# Patient Record
Sex: FEMALE | Race: WHITE | NOT HISPANIC OR LATINO | Employment: UNEMPLOYED | URBAN - METROPOLITAN AREA
[De-identification: names, ages, dates, MRNs, and addresses within clinical notes are randomized per-mention and may not be internally consistent; named-entity substitution may affect disease eponyms.]

---

## 2017-02-01 RX ORDER — LEVOTHYROXINE SODIUM 75 UG/1
TABLET ORAL
Qty: 114 TABLET | Refills: 3 | OUTPATIENT
Start: 2017-02-01

## 2017-02-27 DIAGNOSIS — E06.3 HASHIMOTO'S THYROIDITIS: ICD-10-CM

## 2017-02-27 LAB
T4 FREE SERPL-MCNC: 1 NG/DL (ref 0.76–1.46)
TSH SERPL DL<=0.05 MIU/L-ACNC: 1.17 MU/L (ref 0.4–4)

## 2017-02-27 PROCEDURE — 36415 COLL VENOUS BLD VENIPUNCTURE: CPT | Performed by: INTERNAL MEDICINE

## 2017-02-27 PROCEDURE — 84439 ASSAY OF FREE THYROXINE: CPT | Performed by: INTERNAL MEDICINE

## 2017-02-27 PROCEDURE — 84443 ASSAY THYROID STIM HORMONE: CPT | Performed by: INTERNAL MEDICINE

## 2017-03-14 ENCOUNTER — ANESTHESIA (OUTPATIENT)
Dept: OBGYN | Facility: CLINIC | Age: 37
End: 2017-03-14
Payer: COMMERCIAL

## 2017-03-14 ENCOUNTER — ANESTHESIA EVENT (OUTPATIENT)
Dept: OBGYN | Facility: CLINIC | Age: 37
End: 2017-03-14
Payer: COMMERCIAL

## 2017-03-14 PROCEDURE — 25000128 H RX IP 250 OP 636

## 2017-03-14 RX ADMIN — ROPIVACAINE HYDROCHLORIDE 8 ML: 2 INJECTION, SOLUTION EPIDURAL; INFILTRATION at 12:40

## 2017-03-14 RX ADMIN — ROPIVACAINE HYDROCHLORIDE 10 ML: 2 INJECTION, SOLUTION EPIDURAL; INFILTRATION at 11:45

## 2017-03-14 NOTE — ANESTHESIA PROCEDURE NOTES
Peripheral nerve/Neuraxial procedure note : epidural catheter  Pre-Procedure  Performed by ALEKSEY BOONE  Referred by ROBIN  Location: OB      Pre-Anesthestic Checklist: patient identified, risks and benefits discussed, informed consent and at physician/surgeon's request    Timeout  Correct Patient: Yes   Correct Procedure: Yes   Correct Site: Yes   Correct Laterality: N/A   Correct Position: Yes   Site Marked: N/A   .   Procedure Documentation    .    Procedure:    Epidural catheter.  Insertion Site:L3-4  (midline approach) Injection technique: LORT saline   Local skin infiltrated with mL of 1% lidocaine.       Patient Prep;povidone-iodine 7.5% surgical scrub.  .  Needle: Touhy needle (17 G. 3.5 in). # of attempts: 1. # of redirects:.  Spinal Needle: . . . . .  Catheter threaded easily  3 cm epidural space.  7 cm at skin.   .    Assessment/Narrative  Paresthesias: No.  .  .  Aspiration negative for heme or CSF  . Test dose of 3 mL at. Test dose negative for signs of intravascular, subdural or intrathecal injection. Comments:  No complications

## 2017-03-14 NOTE — ANESTHESIA PREPROCEDURE EVALUATION
Anesthesia Evaluation     .        ROS/MED HX    ENT/Pulmonary:       Neurologic:       Cardiovascular:         METS/Exercise Tolerance:     Hematologic:         Musculoskeletal:         GI/Hepatic:         Renal/Genitourinary:         Endo:     (+) thyroid problem hypothyroidism, .      Psychiatric:         Infectious Disease:         Malignancy:         Other:                              Anesthesia Plan      History & Physical Review      ASA Status:  2 .        Plan for Epidural          Postoperative Care      Consents                          .

## 2017-03-16 NOTE — ANESTHESIA POSTPROCEDURE EVALUATION
Patient: Karen Beverly    * No procedures listed *    Diagnosis:* No pre-op diagnosis entered *  Diagnosis Additional Information: No value filed.    Anesthesia Type:  No value filed.    Note:  Anesthesia Post Evaluation    Patient location during evaluation: bedside  Patient participation: Able to fully participate in evaluation  Level of consciousness: awake and awake and alert  Pain management: adequate  Airway patency: patent  Cardiovascular status: acceptable  Respiratory status: acceptable  Hydration status: acceptable  PONV: none     Anesthetic complications: None    Comments: Ambulating.  Denies HA, paresthesias or complications related to epidural.          Last vitals:  Vitals:    03/15/17 0030 03/15/17 0740 03/15/17 1540   BP: 100/61 (!) 88/54 104/68   Pulse:      Resp: 18 18 18   Temp: 36.6  C (97.9  F) 36.7  C (98.1  F) 36.7  C (98  F)         Electronically Signed By: Erick Davidson MD  March 15, 2017  7:12 PM

## 2017-04-09 ENCOUNTER — OFFICE VISIT (OUTPATIENT)
Dept: URGENT CARE | Facility: URGENT CARE | Age: 37
End: 2017-04-09
Payer: COMMERCIAL

## 2017-04-09 VITALS
BODY MASS INDEX: 25.75 KG/M2 | OXYGEN SATURATION: 100 % | SYSTOLIC BLOOD PRESSURE: 108 MMHG | TEMPERATURE: 99 F | DIASTOLIC BLOOD PRESSURE: 73 MMHG | WEIGHT: 150 LBS | HEART RATE: 100 BPM

## 2017-04-09 DIAGNOSIS — H66.001 ACUTE SUPPURATIVE OTITIS MEDIA OF RIGHT EAR WITHOUT SPONTANEOUS RUPTURE OF TYMPANIC MEMBRANE, RECURRENCE NOT SPECIFIED: Primary | ICD-10-CM

## 2017-04-09 LAB
FLUAV+FLUBV AG SPEC QL: NEGATIVE
FLUAV+FLUBV AG SPEC QL: NORMAL
SPECIMEN SOURCE: NORMAL

## 2017-04-09 PROCEDURE — 99213 OFFICE O/P EST LOW 20 MIN: CPT | Performed by: NURSE PRACTITIONER

## 2017-04-09 PROCEDURE — 87804 INFLUENZA ASSAY W/OPTIC: CPT | Mod: 59 | Performed by: NURSE PRACTITIONER

## 2017-04-09 RX ORDER — AMOXICILLIN 500 MG/1
500 CAPSULE ORAL 3 TIMES DAILY
Qty: 30 CAPSULE | Refills: 0 | Status: SHIPPED | OUTPATIENT
Start: 2017-04-09 | End: 2017-06-11

## 2017-04-09 NOTE — PROGRESS NOTES
SUBJECTIVE:                                                    Karen Beverly is a 37 year old female who presents to clinic today for the following health issues:      RESPIRATORY SYMPTOMS      Duration: 2 days    Description  nasal congestion, facial pain/pressure, fever, ear pain right     Severity: moderate- severe    History (predisposing factors):  none    Precipitating or alleviating factors: None    Therapies tried and outcome:  tylenol     Problem list and histories reviewed & adjusted, as indicated.  Additional history: as documented    Patient Active Problem List   Diagnosis     Pregnancy related condition     Elevated TSH     Thyroid disease, antepartum     History of multiple miscarriages     Hashimoto's thyroiditis     Indication for care in labor or delivery     Labor and delivery, indication for care     Past Surgical History:   Procedure Laterality Date     DAVINCI PELVIC PROCEDURE  2/22/2012    Procedure:DAVINCI PELVIC PROCEDURE; DAVINCI DIAGNOSTIC PELVISCOPY WITH OMNIGUIDE C02 LASER, DIAGNOSTIC HYSTEROSCOPY, EXTENSIVE LYSIS EXCISION OF ENDOMETRIOSIS, BILATERAL URETEROLYSIS, D & C; Surgeon:JOHN KELLY; Location: OR       Social History   Substance Use Topics     Smoking status: Never Smoker     Smokeless tobacco: Never Used     Alcohol use No     Family History   Problem Relation Age of Onset     Hypertension Mother      CANCER Other      liver cancer           ROS:  Constitutional, HEENT, cardiovascular, pulmonary, gi and gu systems are negative, except as otherwise noted.    OBJECTIVE:                                                    /73  Pulse 100  Temp 99  F (37.2  C) (Oral)  Wt 150 lb (68 kg)  LMP 06/11/2016  SpO2 100%  Breastfeeding? No  BMI 25.75 kg/m2  Body mass index is 25.75 kg/(m^2).  GENERAL: healthy, alert and no distress  EYES: Eyes grossly normal to inspection, PERRL and conjunctivae and sclerae normal  HENT: right ear: erythematous and bulging  membrane, left ear: normal: no effusions, no erythema, normal landmarks, nose and mouth without ulcers or lesions, oropharynx clear and oral mucous membranes moist  NECK: no adenopathy, no asymmetry, masses, or scars and thyroid normal to palpation  RESP: lungs clear to auscultation - no rales, rhonchi or wheezes  CV: regular rate and rhythm, normal S1 S2, no S3 or S4, no murmur, click or rub, no peripheral edema and peripheral pulses strong    Diagnostic Test Results:  Results for orders placed or performed in visit on 04/09/17 (from the past 24 hour(s))   Influenza A/B antigen   Result Value Ref Range    Influenza A/B Agn Specimen Nasal     Influenza A Negative NEG    Influenza B  NEG     Negative   Test results must be correlated with clinical data. If necessary, results   should be confirmed by a molecular assay or viral culture.          ASSESSMENT/PLAN:                                                      1. Acute suppurative otitis media of right ear without spontaneous rupture of tympanic membrane, recurrence not specified    - amoxicillin (AMOXIL) 500 MG capsule; Take 1 capsule (500 mg) by mouth 3 times daily  Dispense: 30 capsule; Refill: 0    See Patient Instructions    [facility]

## 2017-04-09 NOTE — MR AVS SNAPSHOT
After Visit Summary   4/9/2017    Karen Beverly    MRN: [id number]           Patient Information     Date Of Birth          1980        Visit Information        Provider Department      4/9/2017 1:50 PM Olga Hidalgo APRN [facility]        Today's Diagnoses     Fever, unspecified    -  1    Acute suppurative otitis media of right ear without spontaneous rupture of tympanic membrane, recurrence not specified          Care Instructions      Middle Ear Infection (Adult)  You have an infection of the middle ear (the space behind the eardrum). This is also called acute otitis media (AOM). Sometimes it is caused by the common cold. This is because congestion can block the internal passage (eustachian tube) that drains fluid from the middle ear. When the middle ear fills with fluid, bacteria can grow there and cause an infection. Oral antibiotics are used to treat this illness, not ear drops. Symptoms usually start to improve within 1 to 2 days of treatment.    Home care  The following are general care guidelines:    Finish all of the antibiotic medicine given, even though you may feel better after the first few days.    You may use acetaminophen or ibuprofen to control pain, unless something else was prescribed. [NOTE: If you have chronic liver or kidney disease or have ever had a stomach ulcer or GI bleeding, talk with your doctor before using these medicines.] Do not give aspirin to anyone under 18 years of age who has a fever. It may cause severe liver damage.  Follow-up care  Follow up with your doctor in 2 weeks if all symptoms have not gotten better, or if hearing doesn't go back to normal within 1 month.  When to seek medical care  Get prompt medical attention if any of the following occur:    Ear pain gets worse or does not improve after 3 days of treatment    Unusual drowsiness or confusion    Neck pain, stiff neck, or headache    Fluid or blood draining from the ear  canal    Fever of 100.4 F (38 C) or higher after 3 days of antibiotics, or as directed by your health care provider    Convulsion (seizure)    7535-2637 [employer]. [address] [place] [address], [place] [address]. All rights reserved. This information is not intended as a substitute for professional medical care. Always follow your healthcare professional's instructions.              Follow-ups after your visit        Your next 10 appointments already scheduled     Jun 26, 2017 10:30 AM CDT   (Arrive by 10:15 AM)   RETURN ENDOCRINE with Jayne Zapata MD   [facility] Endocrinology ([facility] [place])    [address]  3rd Floor  [facility] [address] [phone]              Who to contact     If you have questions or need follow up information about today's clinic visit or your schedule please contact [facility] directly at [phone].  Normal or non-critical lab and imaging results will be communicated to you by MyChart, letter or phone within 4 business days after the clinic has received the results. If you do not hear from us within 7 days, please contact the clinic through [employer] or phone. If you have a critical or abnormal lab result, we will notify you by phone as soon as possible.  Submit refill requests through [employer] or call your pharmacy and they will forward the refill request to us. Please allow 3 business days for your refill to be completed.          Additional Information About Your Visit        MyChart Information     [employer] gives you secure access to your electronic health record. If you see a primary care provider, you can also send messages to your care team and make appointments. If you have questions, please call your primary care clinic.  If you do not have a primary care provider, please call [phone] and they will assist you.        Care EveryWhere ID     This is your Care EveryWhere ID. This could be used by other  organizations to access your [place] medical records  [id number]        Your Vitals Were     Pulse Temperature Last Period Pulse Oximetry Breastfeeding? BMI (Body Mass Index)    100 99  F (37.2  C) (Oral) 06/11/2016 100% No 25.75 kg/m2       Blood Pressure from Last 3 Encounters:   04/09/17 108/73   03/16/17 108/68   06/27/16 105/62    Weight from Last 3 Encounters:   04/09/17 150 lb (68 kg)   06/27/16 133 lb 3.2 oz (60.4 kg)   06/27/16 134 lb 1.6 oz (60.8 kg)              We Performed the Following     Influenza A/B antigen          Today's Medication Changes          These changes are accurate as of: 4/9/17  2:58 PM.  If you have any questions, ask your nurse or doctor.               Start taking these medicines.        Dose/Directions    amoxicillin 500 MG capsule   Commonly known as:  AMOXIL   Used for:  Acute suppurative otitis media of right ear without spontaneous rupture of tympanic membrane, recurrence not specified   Started by:  Olga Hidalgo APRN CNP        Dose:  500 mg   Take 1 capsule (500 mg) by mouth 3 times daily   Quantity:  30 capsule   Refills:  0            Where to get your medicines      [id number] [place] - [address]  [address] [place] [address]     Phone:  [phone]     amoxicillin 500 MG capsule                Primary Care Provider Office Phone # Fax #    Lars Burt -[phone]        PHYSICI[facility] [address] [facility] [address]  [facility] [address]        Thank you!     Thank you for choosing [facility]  for your care. Our goal is always to provide you with excellent care. Hearing back from our patients is one way we can continue to improve our services. Please take a few minutes to complete the written survey that you may receive in the mail after your visit with us. Thank you!             Your Updated Medication List - Protect others around you: Learn how to safely use, store and  throw away your medicines at www.disposemymeds.org.          This list is accurate as of: 4/9/17  2:58 PM.  Always use your most recent med list.                   Brand Name Dispense Instructions for use    amoxicillin 500 MG capsule    AMOXIL    30 capsule    Take 1 capsule (500 mg) by mouth 3 times daily       levothyroxine 75 MCG tablet    SYNTHROID/LEVOTHROID    90 tablet    Take 1 tablet (75 mcg) by mouth daily Take one tablet daily 5 days a week and 2 tablets daily 2 days a week.       PRENATAL VITAMINS PO      Take 1 tablet by mouth daily.

## 2017-04-09 NOTE — PATIENT INSTRUCTIONS
Middle Ear Infection (Adult)  You have an infection of the middle ear (the space behind the eardrum). This is also called acute otitis media (AOM). Sometimes it is caused by the common cold. This is because congestion can block the internal passage (eustachian tube) that drains fluid from the middle ear. When the middle ear fills with fluid, bacteria can grow there and cause an infection. Oral antibiotics are used to treat this illness, not ear drops. Symptoms usually start to improve within 1 to 2 days of treatment.    Home care  The following are general care guidelines:    Finish all of the antibiotic medicine given, even though you may feel better after the first few days.    You may use acetaminophen or ibuprofen to control pain, unless something else was prescribed. [NOTE: If you have chronic liver or kidney disease or have ever had a stomach ulcer or GI bleeding, talk with your doctor before using these medicines.] Do not give aspirin to anyone under 18 years of age who has a fever. It may cause severe liver damage.  Follow-up care  Follow up with your doctor in 2 weeks if all symptoms have not gotten better, or if hearing doesn't go back to normal within 1 month.  When to seek medical care  Get prompt medical attention if any of the following occur:    Ear pain gets worse or does not improve after 3 days of treatment    Unusual drowsiness or confusion    Neck pain, stiff neck, or headache    Fluid or blood draining from the ear canal    Fever of 100.4 F (38 C) or higher after 3 days of antibiotics, or as directed by your health care provider    Convulsion (seizure)    5207-1668 [employer]. [address] [place] [address], [place] [address]. All rights reserved. This information is not intended as a substitute for professional medical care. Always follow your healthcare professional's instructions.

## 2017-04-09 NOTE — NURSING NOTE
Chief Complaint   Patient presents with     Fever     fever/body aches x2 days     Lucero Manuel CMA

## 2017-06-11 ENCOUNTER — OFFICE VISIT (OUTPATIENT)
Dept: URGENT CARE | Facility: URGENT CARE | Age: 37
End: 2017-06-11
Payer: COMMERCIAL

## 2017-06-11 VITALS
HEART RATE: 98 BPM | BODY MASS INDEX: 24.89 KG/M2 | SYSTOLIC BLOOD PRESSURE: 113 MMHG | TEMPERATURE: 98.1 F | WEIGHT: 145 LBS | RESPIRATION RATE: 16 BRPM | DIASTOLIC BLOOD PRESSURE: 69 MMHG

## 2017-06-11 DIAGNOSIS — R30.0 DYSURIA: ICD-10-CM

## 2017-06-11 DIAGNOSIS — R82.90 NONSPECIFIC FINDING ON EXAMINATION OF URINE: ICD-10-CM

## 2017-06-11 DIAGNOSIS — N30.01 ACUTE CYSTITIS WITH HEMATURIA: Primary | ICD-10-CM

## 2017-06-11 LAB
ALBUMIN UR-MCNC: 30 MG/DL
APPEARANCE UR: ABNORMAL
BACTERIA #/AREA URNS HPF: ABNORMAL /HPF
BILIRUB UR QL STRIP: NEGATIVE
COLOR UR AUTO: YELLOW
GLUCOSE UR STRIP-MCNC: NEGATIVE MG/DL
HGB UR QL STRIP: ABNORMAL
KETONES UR STRIP-MCNC: NEGATIVE MG/DL
LEUKOCYTE ESTERASE UR QL STRIP: ABNORMAL
NITRATE UR QL: NEGATIVE
PH UR STRIP: 6 PH (ref 5–7)
RBC #/AREA URNS AUTO: ABNORMAL /HPF (ref 0–2)
SP GR UR STRIP: 1.02 (ref 1–1.03)
URN SPEC COLLECT METH UR: ABNORMAL
UROBILINOGEN UR STRIP-ACNC: 0.2 EU/DL (ref 0.2–1)
WBC #/AREA URNS AUTO: ABNORMAL /HPF (ref 0–2)

## 2017-06-11 PROCEDURE — 87086 URINE CULTURE/COLONY COUNT: CPT | Performed by: FAMILY MEDICINE

## 2017-06-11 PROCEDURE — 99214 OFFICE O/P EST MOD 30 MIN: CPT | Performed by: FAMILY MEDICINE

## 2017-06-11 PROCEDURE — 81001 URINALYSIS AUTO W/SCOPE: CPT | Performed by: FAMILY MEDICINE

## 2017-06-11 RX ORDER — PHENAZOPYRIDINE HYDROCHLORIDE 200 MG/1
200 TABLET, FILM COATED ORAL 3 TIMES DAILY PRN
Qty: 6 TABLET | Refills: 0 | Status: SHIPPED | OUTPATIENT
Start: 2017-06-11 | End: 2017-06-27

## 2017-06-11 RX ORDER — AMOXICILLIN 875 MG
875 TABLET ORAL 2 TIMES DAILY
Qty: 20 TABLET | Refills: 0 | Status: SHIPPED | OUTPATIENT
Start: 2017-06-11 | End: 2017-06-21

## 2017-06-11 NOTE — NURSING NOTE
"Chief Complaint   Patient presents with     UTI       Initial /69  Pulse 98  Temp 98.1  F (36.7  C)  Resp 16  Wt 145 lb (65.8 kg)  LMP 06/11/2016  BMI 24.89 kg/m2 Estimated body mass index is 24.89 kg/(m^2) as calculated from the following:    Height as of 6/27/16: 5' 4\" (1.626 m).    Weight as of this encounter: 145 lb (65.8 kg).  Medication Reconciliation: complete     Jonathan Gamboa. MA      "

## 2017-06-11 NOTE — MR AVS SNAPSHOT
After Visit Summary   6/11/2017    Karen Beverly    MRN: [id number]           Patient Information     Date Of Birth          1980        Visit Information        Provider Department      6/11/2017 12:25 PM Scarlet Hazel MD [facility]        Today's Diagnoses     Acute cystitis with hematuria    -  1    Dysuria        Nonspecific finding on examination of urine           Follow-ups after your visit        Your next 10 appointments already scheduled     Jun 26, 2017 10:30 AM CDT   (Arrive by 10:15 AM)   RETURN ENDOCRINE with Jayne Zapata MD   [facility] Endocrinology ([facility] [place])    [address] [place] [address]  3rd [facility] [address] [phone]              Future tests that were ordered for you today     Open Future Orders        Priority Expected Expires Ordered    **UA reflex to Microscopic FUTURE 2mo Routine 8/5/2017 10/9/2017 6/11/2017            Who to contact     If you have questions or need follow up information about today's clinic visit or your schedule please contact [facility] directly at [phone].  Normal or non-critical lab and imaging results will be communicated to you by [employer], letter or phone within 4 business days after the clinic has received the results. If you do not hear from us within 7 days, please contact the clinic through [employer] or phone. If you have a critical or abnormal lab result, we will notify you by phone as soon as possible.  Submit refill requests through [email or web address] or call your pharmacy and they will forward the refill request to us. Please allow 3 business days for your refill to be completed.          Additional Information About Your Visit        MyChart Information     [email or web address] gives you secure access to your electronic health record. If you see a primary care provider, you can also send messages to your care team and make appointments. If you have questions, please  call your primary care clinic.  If you do not have a primary care provider, please call [phone] and they will assist you.        Care EveryWhere ID     This is your Care EveryWhere ID. This could be used by other organizations to access your [place] medical records  [id number]        Your Vitals Were     Pulse Temperature Respirations Last Period BMI (Body Mass Index)       98 98.1  F (36.7  C) 16 06/11/2016 24.89 kg/m2        Blood Pressure from Last 3 Encounters:   06/11/17 113/69   04/09/17 108/73   03/16/17 108/68    Weight from Last 3 Encounters:   06/11/17 145 lb (65.8 kg)   04/09/17 150 lb (68 kg)   06/27/16 133 lb 3.2 oz (60.4 kg)              We Performed the Following     *UA reflex to Microscopic and Culture ([place] [facility] (except [place] and [place])     Urine Culture Aerobic Bacterial     Urine Microscopic          Today's Medication Changes          These changes are accurate as of: 6/11/17  1:05 PM.  If you have any questions, ask your nurse or doctor.               Start taking these medicines.        Dose/Directions    amoxicillin 875 MG tablet   Commonly known as:  AMOXIL   Used for:  Acute cystitis with hematuria   Started by:  Scarlet Hazel MD        Dose:  875 mg   Take 1 tablet (875 mg) by mouth 2 times daily for 10 days if taking birth control, this may decrease effectiveness of birth control   Quantity:  20 tablet   Refills:  0       phenazopyridine 200 MG tablet   Commonly known as:  PYRIDIUM   Used for:  Dysuria   Started by:  Scarlet Haezl MD        Dose:  200 mg   Take 1 tablet (200 mg) by mouth 3 times daily as needed for irritation   Quantity:  6 tablet   Refills:  0            Where to get your medicines      [id number] [place] - [address]  [address] [place] [address]     Phone:  [phone]     amoxicillin 875 MG tablet    phenazopyridine 200 MG tablet                Primary  Care Provider Office Phone # Fax #    Lars Burt [phone]        PHYSICI[facility] [address] [facility] [address]  [facility] [address]        Thank you!     Thank you for choosing [facility]  for your care. Our goal is always to provide you with excellent care. Hearing back from our patients is one way we can continue to improve our services. Please take a few minutes to complete the written survey that you may receive in the mail after your visit with us. Thank you!             Your Updated Medication List - Protect others around you: Learn how to safely use, store and throw away your medicines at www.disposemymeds.org.          This list is accurate as of: 6/11/17  1:05 PM.  Always use your most recent med list.                   Brand Name Dispense Instructions for use    amoxicillin 875 MG tablet    AMOXIL    20 tablet    Take 1 tablet (875 mg) by mouth 2 times daily for 10 days if taking birth control, this may decrease effectiveness of birth control       levothyroxine 75 MCG tablet    SYNTHROID/LEVOTHROID    90 tablet    Take 1 tablet (75 mcg) by mouth daily Take one tablet daily 5 days a week and 2 tablets daily 2 days a week.       phenazopyridine 200 MG tablet    PYRIDIUM    6 tablet    Take 1 tablet (200 mg) by mouth 3 times daily as needed for irritation       PRENATAL VITAMINS PO      Take 1 tablet by mouth daily.

## 2017-06-11 NOTE — PROGRESS NOTES
SUBJECTIVE:                                                    Karen Beverly is a 37 year old female who presents to clinic today for the following health issues:      URINARY TRACT SYMPTOMS      Duration: 1 day    Description  dysuria and frequency    Intensity:  moderate    Accompanying signs and symptoms:  Fever/chills: no  Flank pain no   Nausea and vomiting: no   Vaginal symptoms: discharge and odor  Abdominal/Pelvic Pain: YES    History  History of frequent UTI's: no   History of kidney stones: no   Sexually Active: YES  Possibility of pregnancy: No    Precipitating or alleviating factors: None    Therapies tried and outcome: none   Outcome:      Today started having dysuria urgency frequency      SUBJECTIVE:                                                    Karen Beverly is a 37 year old female who presents to clinic today for the following health issues: concern about uti     Fever chills: None  Nausea vomiting: None  Flank Pain: None  Patient denies possibility of pregnancy  Denies possibility of STD, declined STD testing.    Problem list and histories reviewed & adjusted, as indicated.  Additional history: as documented    Problem list, Medication list, Allergies, and Medical/Social/Surgical histories reviewed in EPIC and updated as appropriate.    ROS:  Constitutional, HEENT, cardiovascular, pulmonary, gi and gu systems are negative, except as otherwise noted.    OBJECTIVE:                                                    /69  Pulse 98  Temp 98.1  F (36.7  C)  Resp 16  Wt 145 lb (65.8 kg)  LMP 06/11/2016  BMI 24.89 kg/m2  Body mass index is 24.89 kg/(m^2).  GENERAL: healthy, alert and no distress  NECK: no adenopathy, no asymmetry, masses, or scars and thyroid normal to palpation  RESP: lungs clear to auscultation - no rales, rhonchi or wheezes  CV: regular rate and rhythm, normal S1 S2, no S3 or S4, no murmur, click or rub, no peripheral edema and peripheral pulses  strong  ABDOMEN: soft, nontender, no hepatosplenomegaly, no masses and bowel sounds normal  MS: no gross musculoskeletal defects noted, no edema    Diagnostic Test Results:  Results for orders placed or performed in visit on 06/11/17 (from the past 24 hour(s))   *UA reflex to Microscopic and Culture ([place] [facility] (except [place] and [place])   Result Value Ref Range    Color Urine Yellow     Appearance Urine Slightly Cloudy     Glucose Urine Negative NEG mg/dL    Bilirubin Urine Negative NEG    Ketones Urine Negative NEG mg/dL    Specific Gravity Urine 1.025 1.003 - 1.035    Blood Urine Large (A) NEG    pH Urine 6.0 5.0 - 7.0 pH    Protein Albumin Urine 30 (A) NEG mg/dL    Urobilinogen Urine 0.2 0.2 - 1.0 EU/dL    Nitrite Urine Negative NEG    Leukocyte Esterase Urine Moderate (A) NEG    Source Midstream Urine    Urine Microscopic   Result Value Ref Range    WBC Urine  (A) 0 - 2 /HPF    RBC Urine  (A) 0 - 2 /HPF    Bacteria Urine Few (A) NEG /HPF        ASSESSMENT/PLAN:                                                        ICD-10-CM    1. Acute cystitis with hematuria N30.01 amoxicillin (AMOXIL) 875 MG tablet     **UA reflex to Microscopic FUTURE 2mo   2. Dysuria R30.0 *UA reflex to Microscopic and Culture ([facility] (except [place] and [place])     Urine Microscopic     phenazopyridine (PYRIDIUM) 200 MG tablet   3. Nonspecific finding on examination of urine R82.90 Urine Culture Aerobic Bacterial       Prescribed with amoxicillin as patient is still lactating.  GI intolerance to augmentin but patient able to tolerate amoxicillin.   Patient was adamantly requesting something for bladder spasm  Prescribed with pyridium as needed for bladder spasm. However discussed with pharmacy potential risks with lactating.  No good data to say if it is safe or not, it is a category B in pregnancy. Pharmacy states that they will run the risks with her and patient may decide not  to pick it up.  Options of pumping and throwing away the milk is a valid option if patient truly needing to take pyridium.   Aware to go to ER or come in immediately if with any fever chills nausea vomiting or flank pain.  Adverse reactions of medications discussed.  Over the counter medications discussed.   Aware to come back in if with worsening symptoms or if no relief despite treatment plan  Patient voiced understanding and had no further questions.     Patient with hematuria advised a repeat urinalysis in 6 weeks is needed to make sure hematuria is resolved. If persistent aware will need further evaluation to rule out possibility of stones or tumors. Patient will make lab appointment      MD Scarlet Gonzalez MD  [facility]

## 2017-06-12 LAB
BACTERIA SPEC CULT: NORMAL
MICRO REPORT STATUS: NORMAL
SPECIMEN SOURCE: NORMAL

## 2017-06-25 NOTE — PROGRESS NOTES
"  The patient is seen in f/up for Hashimoto thyroiditis, diagnosed in 2013.     Karen Diehl is a 37 year old female who was found to have an elevated TSH while having lab work done in [place], in August 2013, approximately 6 months postdelivery, when she developed fatigue, hair loss, and joint pain. In the fall of 2013, she established medical care here and she was formally diagnosed with Hashimoto thyroiditis. Since 2013, she was treated with a stable dose of 75  g levothyroxine daily, until she got pregnant.  She delivered in March 2017 and, since delivery, she went back on her regular dose of levothyroxine.     She continues to have \"a tickle\" in her throat which makes her cough from time to time. In the past, this improved with treatment with omeprazole but she wasn't able to tolerate it due to diarrhea and cramps.     Since delivery, she's been dealing with episodes of pink eye, congested nose and sinuses (not formally diagnosed with allergies but she suspects she has been allergic), hair loss.  Occasionally, she has noticed some joint pain in the wrists.  She is breast feeding and she plans on doing this for one year.  Her menstrual periods did not resume.    Past Medical History:   Diagnosis Date     Eczema      Endometriosis      Thyroid disease    Iron deficiency anemia   Endometriosis   GERD  Internal hemorrhoids  Miscarriage 2016    Past Surgical History:   Procedure Laterality Date     DAVINCI PELVIC PROCEDURE  2/22/2012    Procedure:DAVINCI PELVIC PROCEDURE; DAVINCI DIAGNOSTIC PELVISCOPY WITH OMNIGUIDE C02 LASER, DIAGNOSTIC HYSTEROSCOPY, EXTENSIVE LYSIS EXCISION OF ENDOMETRIOSIS, BILATERAL URETEROLYSIS, D & C; Surgeon:JOHN KELLY; Location: OR     Current Medications    Current Outpatient Prescriptions:      phenazopyridine (PYRIDIUM) 200 MG tablet, Take 1 tablet (200 mg) by mouth 3 times daily as needed for irritation, Disp: 6 tablet, Rfl: 0     levothyroxine (SYNTHROID, LEVOTHROID) 75 MCG " "tablet, Take 1 tablet (75 mcg) by mouth daily Take one tablet daily 5 days a week and 2 tablets daily 2 days a week., Disp: 90 tablet, Rfl: 3     PRENATAL VITAMINS PO, Take 1 tablet by mouth daily.  , Disp: , Rfl:     Family History   Problem Relation Age of Onset     Hypertension Mother      Cancer Maternal grandmother       liver cancer   Maternal grandmother HTN.     Social History   with one child. She denies smoking, drinking alcohol or using illicit drugs. Occupation: stay at home mom.     Review of Systems   Systemic:              No significant fatigue, weight up 10 lbs above her prepregnancy test   Eye:                      Eye glasses   Chanel-Laryngeal:     No dysphagia, occasional voice hoarseness; still has the tickle in her throat   Breast:                  No breast symptoms  Cardiovascular:    No cardiovascular symptoms, no CP or palpitations   Pulmonary:           no SOB  Gastrointestinal:   Intermittent constipation relieved by high fiber intake - stable   Genitourinary:       no increased thirst; urinates 2-5 times a night  Endocrine:            Some heat intolerance noted since delivery   Neurological:        Occasional headaches, no tremor, no numbness or tingling sensation, no dizziness   Musculoskeletal:  Wrist an elbow pain with prolonged movements   Skin:                    no hair loss; itchy skin rash on the forearms - present for years - gets better during summer with sun exposure  Psychological:     No psychological symptoms                 Vital Signs     Previous Weights:    Wt Readings from Last 5 Encounters:   06/26/17 65.6 kg (144 lb 9.6 oz)   06/11/17 65.8 kg (145 lb)   04/09/17 68 kg (150 lb)   06/27/16 60.4 kg (133 lb 3.2 oz)   06/27/16 60.8 kg (134 lb 1.6 oz)        BP 92/56 (BP Location: Right arm, Patient Position: Chair, Cuff Size: Adult Regular)  Pulse 75  Ht 1.651 m (5' 5\")  Wt 65.6 kg (144 lb 9.6 oz)  BMI 24.06 kg/m2    Physical Exam  General Appearance: she is " well developed, well nourished and in no distress     Eyes:  conjutivae and extra-ocular motions are normal.                                    pupils round and reactive to light, no lid lag, no stare    HEENT:   oropharynx clear and moist, no JVD, no bruits      no thyromegaly, palpable irregularity of the thyroid isthmus, with no delineable nodule  Cardiovascular:  regular rhythm, no murmurs, distal pulse palpable, no edema  Respiratory:        chest clear, no rales, no rhonchi   Gastrointestinal:  abdomen soft, non-tender, non-distended, normal bowel sounds,    no organomegaly  Musculoskeletal:  normal tone and strength  Psychological:          affect and judgment normal  Skin:  itchy rash on the arms, around the hair follicles   Neurological:  reflexes normal and symmetric, no resting tremor.      Lab Results  I reviewed prior lab results documented in EPIC.   TSH   Date Value Ref Range Status   02/27/2017 1.17 0.40 - 4.00 mU/L Final     T4 Free   Date Value Ref Range Status   02/27/2017 1.00 0.76 - 1.46 ng/dL Final     Lab Results   Component Value Date    A1C 5.4 08/08/2016     Assessment     1. Hashimoto thyroiditis  Plan: f/up TFT today    2. Dry cough, congestion - suspect GERD or allergies    No orders of the defined types were placed in this encounter.    Answers for HPI/ROS submitted by the patient on 6/26/2017   General Symptoms: Yes  Skin Symptoms: Yes  HENT Symptoms: Yes  EYE SYMPTOMS: Yes  HEART SYMPTOMS: No  LUNG SYMPTOMS: Yes  INTESTINAL SYMPTOMS: No  URINARY SYMPTOMS: Yes  GYNECOLOGIC SYMPTOMS: No  BREAST SYMPTOMS: No  SKELETAL SYMPTOMS: Yes  BLOOD SYMPTOMS: No  NERVOUS SYSTEM SYMPTOMS: No  MENTAL HEALTH SYMPTOMS: No  Fever: No  Loss of appetite: No  Weight loss: No  Weight gain: Yes  Night sweats: Yes  Excessive hunger: No  Excessive thirst: Yes  Feeling hot or cold when others believe the temperature is normal: Yes  Post-operative complications: No  Surgical site pain: No  Hallucinations:  No  Change in or Loss of Energy: No  Hyperactivity: No  Confusion: No  Changes in hair: Yes  Changes in moles/birth marks: No  Itching: No  Rashes: No  Changes in nails: No  Acne: No  Hair in places you don't want it: No  Change in facial hair: No  Warts: No  Non-healing sores: No  Scarring: No  Flaking of skin: No  Color changes of hands/feet in cold : No  Sun sensitivity: Yes  Skin thickening: No  Ear pain: No  Ear discharge: No  Hearing loss: No  Tinnitus: No  Nosebleeds: No  Congestion: Yes  Sinus pain: No  Trouble swallowing: No   Voice hoarseness: Yes  Mouth sores: No  Sore throat: Yes  Tooth pain: No  Gum tenderness: No  Bleeding gums: No  Change in taste: Yes  Change in sense of smell: Yes  Dry mouth: No  Hearing aid used: No  Neck lump: No  Eye pain: No  Vision loss: No  Dry eyes: No  Watery eyes: No  Eye bulging: No  Double vision: No  Flashing of lights: No  Spots: No  Floaters: No  Redness: No  Crossed eyes: No  Tunnel Vision: No  Yellowing of eyes: No  Eye irritation: Yes  Cough: No  Sputum or phlegm: No  Coughing up blood: No  Difficulty breating or shortness of breath: No  Snoring: No  Wheezing: No  Difficulty breathing on exertion: No  Respiratory pain: No  Nighttime Cough: No  Difficulty breathing when lying flat: No  Trouble holding urine or incontinence: Yes  Pain or burning: Yes  Trouble starting or stopping: Yes  Increased frequency of urination: No  Blood in urine: Yes  Decreased frequency of urination: Yes  Frequent nighttime urination: Yes  Flank pain: No  Difficulty emptying bladder: Yes  Back pain: No  Muscle aches: No  Neck pain: No  Swollen joints: No  Joint pain: Yes  Bone pain: No  Muscle cramps: No  Muscle weakness: No  Joint stiffness: No  Bone fracture: No

## 2017-06-26 ENCOUNTER — OFFICE VISIT (OUTPATIENT)
Dept: ENDOCRINOLOGY | Facility: CLINIC | Age: 37
End: 2017-06-26

## 2017-06-26 VITALS
WEIGHT: 144.6 LBS | SYSTOLIC BLOOD PRESSURE: 92 MMHG | HEART RATE: 75 BPM | DIASTOLIC BLOOD PRESSURE: 56 MMHG | BODY MASS INDEX: 24.09 KG/M2 | HEIGHT: 65 IN

## 2017-06-26 DIAGNOSIS — E06.3 HASHIMOTO'S THYROIDITIS: ICD-10-CM

## 2017-06-26 DIAGNOSIS — E06.3 HASHIMOTO'S THYROIDITIS: Primary | ICD-10-CM

## 2017-06-26 LAB
T4 FREE SERPL-MCNC: 1.19 NG/DL (ref 0.76–1.46)
TSH SERPL DL<=0.05 MIU/L-ACNC: <0.01 MU/L (ref 0.4–4)

## 2017-06-26 PROCEDURE — 36415 COLL VENOUS BLD VENIPUNCTURE: CPT | Performed by: INTERNAL MEDICINE

## 2017-06-26 PROCEDURE — 84443 ASSAY THYROID STIM HORMONE: CPT | Performed by: INTERNAL MEDICINE

## 2017-06-26 PROCEDURE — 84439 ASSAY OF FREE THYROXINE: CPT | Performed by: INTERNAL MEDICINE

## 2017-06-26 ASSESSMENT — ENCOUNTER SYMPTOMS
COUGH DISTURBING SLEEP: 0
POLYDIPSIA: 1
MUSCLE CRAMPS: 0
EYE REDNESS: 0
HOARSE VOICE: 1
WEIGHT LOSS: 0
FEVER: 0
DIFFICULTY URINATING: 1
DECREASED APPETITE: 0
POSTURAL DYSPNEA: 0
NIGHT SWEATS: 1
HALLUCINATIONS: 0
SHORTNESS OF BREATH: 0
EYE IRRITATION: 1
SINUS CONGESTION: 1
HEMOPTYSIS: 0
FLANK PAIN: 0
WEIGHT GAIN: 1
WHEEZING: 0
ALTERED TEMPERATURE REGULATION: 1
NECK PAIN: 0
SKIN CHANGES: 0
HEMATURIA: 1
SMELL DISTURBANCE: 1
SORE THROAT: 1
TROUBLE SWALLOWING: 0
NECK MASS: 0
COUGH: 0
JOINT SWELLING: 0
RESPIRATORY PAIN: 0
NAIL CHANGES: 0
EYE WATERING: 0
SNORES LOUDLY: 0
DOUBLE VISION: 0
INCREASED ENERGY: 0
SPUTUM PRODUCTION: 0
ARTHRALGIAS: 1
SINUS PAIN: 0
EYE PAIN: 0
BACK PAIN: 0
MYALGIAS: 0
MUSCLE WEAKNESS: 0
TASTE DISTURBANCE: 1
DYSPNEA ON EXERTION: 0
STIFFNESS: 0
DYSURIA: 1
POLYPHAGIA: 0
POOR WOUND HEALING: 0

## 2017-06-26 NOTE — MR AVS SNAPSHOT
After Visit Summary   6/26/2017    Karen Beverly    MRN: [id number]           Patient Information     Date Of Birth          1980        Visit Information        Provider Department      6/26/2017 10:30 AM Jayne Zapata MD [facility]        Today's Diagnoses     Hashimoto's thyroiditis    -  1       Follow-ups after your visit        Follow-up notes from your care team     Return in about 1 year (around 6/26/2018).      Your next 10 appointments already scheduled     Jun 27, 2017  9:20 AM CDT   (Arrive by 9:05 AM)   Return Visit with Linda Priest MD   [facility] ([facility])    [address]  [address] [place] [address] [phone]              Who to contact     Please call your clinic at [phone] to:    Ask questions about your health    Make or cancel appointments    Discuss your medicines    Learn about your test results    Speak to your doctor   If you have compliments or concerns about an experience at your clinic, or if you wish to file a complaint, please contact [facility] Patient Relations at [phone] or email us at [email or web address]         Additional Information About Your Visit        MyChart Information     [employer] gives you secure access to your electronic health record. If you see a primary care provider, you can also send messages to your care team and make appointments. If you have questions, please call your primary care clinic.  If you do not have a primary care provider, please call [phone] and they will assist you.      [employer] is an electronic gateway that provides easy, online access to your medical records. With [employer], you can request a clinic appointment, read your test results, renew a prescription or communicate with your care team.     To access your existing account, please contact your [facility]  "Physicians Clinic or call [phone] for assistance.        Care EveryWhere ID     This is your Care EveryWhere ID. This could be used by other organizations to access your [place] medical records  [id number]        Your Vitals Were     Pulse Height BMI (Body Mass Index)             75 1.651 m (5' 5\") 24.06 kg/m2          Blood Pressure from Last 3 Encounters:   06/26/17 92/56   06/11/17 113/69   04/09/17 108/73    Weight from Last 3 Encounters:   06/26/17 65.6 kg (144 lb 9.6 oz)   06/11/17 65.8 kg (145 lb)   04/09/17 68 kg (150 lb)              Today, you had the following     No orders found for display         Today's Medication Changes          These changes are accurate as of: 6/26/17 11:38 PM.  If you have any questions, ask your nurse or doctor.               These medicines have changed or have updated prescriptions.        Dose/Directions    levothyroxine 75 MCG tablet   Commonly known as:  SYNTHROID/LEVOTHROID   This may have changed:  additional instructions   Used for:  Hashimoto's thyroiditis        Dose:  75 mcg   Take 1 tablet (75 mcg) by mouth daily Take one tablet daily 5 days a week and 2 tablets daily 2 days a week.   Quantity:  90 tablet   Refills:  3                Primary Care Provider Office Phone # Fax #    Lars Burt [phone]       [address] [address]        Equal Access to Services     DEVANTE HARDING AH: Hadii jose m munizo Sobhargavi, waaxda luqadaha, qaybta kaalmada bety, maria isabel brewer. So [facility] [phone].    ATENCIÓN: Si habla español, tiene a christiansen disposición servicios gratuitos de asistencia lingüística. Llame al [phone].    We comply with applicable federal civil rights laws and [place] laws. We do not discriminate on the basis of race, color, national origin, age, disability sex, sexual orientation or gender identity.            Thank you!     Thank you for choosing [facility]" ENDOCRINOLOGY  for your care. Our goal is always to provide you with excellent care. Hearing back from our patients is one way we can continue to improve our services. Please take a few minutes to complete the written survey that you may receive in the mail after your visit with us. Thank you!             Your Updated Medication List - Protect others around you: Learn how to safely use, store and throw away your medicines at www.disposemymeds.org.          This list is accurate as of: 6/26/17 11:38 PM.  Always use your most recent med list.                   Brand Name Dispense Instructions for use Diagnosis    levothyroxine 75 MCG tablet    SYNTHROID/LEVOTHROID    90 tablet    Take 1 tablet (75 mcg) by mouth daily Take one tablet daily 5 days a week and 2 tablets daily 2 days a week.    Hashimoto's thyroiditis       phenazopyridine 200 MG tablet    PYRIDIUM    6 tablet    Take 1 tablet (200 mg) by mouth 3 times daily as needed for irritation    Dysuria       PRENATAL VITAMINS PO      Take 1 tablet by mouth daily.

## 2017-06-26 NOTE — LETTER
"6/26/2017       RE: aKren Beverly  [address] [facility] [address]     Dear Colleague,    Thank you for referring your patient, Karen Beverly, to the [facility] ENDOCRINOLOGY at [facility]. Please see a copy of my visit note below.      The patient is seen in f/up for Hashimoto thyroiditis, diagnosed in 2013.     Karen Diehl is a 37 year old female who was found to have an elevated TSH while having lab work done in [place], in August 2013, approximately 6 months postdelivery, when she developed fatigue, hair loss, and joint pain. In the fall of 2013, she established medical care here and she was formally diagnosed with Hashimoto thyroiditis. Since 2013, she was treated with a stable dose of 75  g levothyroxine daily, until she got pregnant.  She delivered in March 2017 and, since delivery, she went back on her regular dose of levothyroxine.     She continues to have \"a tickle\" in her throat which makes her cough from time to time. In the past, this improved with treatment with omeprazole but she wasn't able to tolerate it due to diarrhea and cramps.     Since delivery, she's been dealing with episodes of pink eye, congested nose and sinuses (not formally diagnosed with allergies but she suspects she has been allergic), hair loss.  Occasionally, she has noticed some joint pain in the wrists.  She is breast feeding and she plans on doing this for one year.  Her menstrual periods did not resume.    Past Medical History:   Diagnosis Date     Eczema      Endometriosis      Thyroid disease    Iron deficiency anemia   Endometriosis   GERD  Internal hemorrhoids  Miscarriage 2016    Past Surgical History:   Procedure Laterality Date     DAVINCI PELVIC PROCEDURE  2/22/2012    Procedure:DAVINCI PELVIC PROCEDURE; DAVINCI DIAGNOSTIC PELVISCOPY WITH OMNIGUIDE C02 LASER, DIAGNOSTIC HYSTEROSCOPY, EXTENSIVE LYSIS EXCISION OF ENDOMETRIOSIS, BILATERAL URETEROLYSIS, D & C; Surgeon:ROBIN, " JOHN ROMANO; Location: OR     Current Medications    Current Outpatient Prescriptions:      phenazopyridine (PYRIDIUM) 200 MG tablet, Take 1 tablet (200 mg) by mouth 3 times daily as needed for irritation, Disp: 6 tablet, Rfl: 0     levothyroxine (SYNTHROID, LEVOTHROID) 75 MCG tablet, Take 1 tablet (75 mcg) by mouth daily Take one tablet daily 5 days a week and 2 tablets daily 2 days a week., Disp: 90 tablet, Rfl: 3     PRENATAL VITAMINS PO, Take 1 tablet by mouth daily.  , Disp: , Rfl:     Family History   Problem Relation Age of Onset     Hypertension Mother      Cancer Maternal grandmother       liver cancer   Maternal grandmother HTN.     Social History   with one child. She denies smoking, drinking alcohol or using illicit drugs. Occupation: stay at home mom.     Review of Systems   Systemic:              No significant fatigue, weight up 10 lbs above her prepregnancy test   Eye:                      Eye glasses   Chanel-Laryngeal:     No dysphagia, occasional voice hoarseness; still has the tickle in her throat   Breast:                  No breast symptoms  Cardiovascular:    No cardiovascular symptoms, no CP or palpitations   Pulmonary:           no SOB  Gastrointestinal:   Intermittent constipation relieved by high fiber intake - stable   Genitourinary:       no increased thirst; urinates 2-5 times a night  Endocrine:            Some heat intolerance noted since delivery   Neurological:        Occasional headaches, no tremor, no numbness or tingling sensation, no dizziness   Musculoskeletal:  Wrist an elbow pain with prolonged movements   Skin:                    no hair loss; itchy skin rash on the forearms - present for years - gets better during summer with sun exposure  Psychological:     No psychological symptoms                 Vital Signs     Previous Weights:    Wt Readings from Last 5 Encounters:   06/26/17 65.6 kg (144 lb 9.6 oz)   06/11/17 65.8 kg (145 lb)   04/09/17 68 kg (150 lb)  "  06/27/16 60.4 kg (133 lb 3.2 oz)   06/27/16 60.8 kg (134 lb 1.6 oz)        BP 92/56 (BP Location: Right arm, Patient Position: Chair, Cuff Size: Adult Regular)  Pulse 75  Ht 1.651 m (5' 5\")  Wt 65.6 kg (144 lb 9.6 oz)  BMI 24.06 kg/m2    Physical Exam  General Appearance: she is well developed, well nourished and in no distress     Eyes:  conjutivae and extra-ocular motions are normal.                                    pupils round and reactive to light, no lid lag, no stare    HEENT:   oropharynx clear and moist, no JVD, no bruits      no thyromegaly, palpable irregularity of the thyroid isthmus, with no delineable nodule  Cardiovascular:  regular rhythm, no murmurs, distal pulse palpable, no edema  Respiratory:        chest clear, no rales, no rhonchi   Gastrointestinal:  abdomen soft, non-tender, non-distended, normal bowel sounds,    no organomegaly  Musculoskeletal:  normal tone and strength  Psychological:          affect and judgment normal  Skin:  itchy rash on the arms, around the hair follicles   Neurological:  reflexes normal and symmetric, no resting tremor.      Lab Results  I reviewed prior lab results documented in EPIC.   TSH   Date Value Ref Range Status   02/27/2017 1.17 0.40 - 4.00 mU/L Final     T4 Free   Date Value Ref Range Status   02/27/2017 1.00 0.76 - 1.46 ng/dL Final     Lab Results   Component Value Date    A1C 5.4 08/08/2016     Assessment     1. Hashimoto thyroiditis  Plan: f/up TFT today    2. Dry cough, congestion - suspect GERD or allergies    No orders of the defined types were placed in this encounter.    Answers for HPI/ROS submitted by the patient on 6/26/2017   General Symptoms: Yes  Skin Symptoms: Yes  HENT Symptoms: Yes  EYE SYMPTOMS: Yes  HEART SYMPTOMS: No  LUNG SYMPTOMS: Yes  INTESTINAL SYMPTOMS: No  URINARY SYMPTOMS: Yes  GYNECOLOGIC SYMPTOMS: No  BREAST SYMPTOMS: No  SKELETAL SYMPTOMS: Yes  BLOOD SYMPTOMS: No  NERVOUS SYSTEM SYMPTOMS: No  MENTAL HEALTH SYMPTOMS: " No  Fever: No  Loss of appetite: No  Weight loss: No  Weight gain: Yes  Night sweats: Yes  Excessive hunger: No  Excessive thirst: Yes  Feeling hot or cold when others believe the temperature is normal: Yes  Post-operative complications: No  Surgical site pain: No  Hallucinations: No  Change in or Loss of Energy: No  Hyperactivity: No  Confusion: No  Changes in hair: Yes  Changes in moles/birth marks: No  Itching: No  Rashes: No  Changes in nails: No  Acne: No  Hair in places you don't want it: No  Change in facial hair: No  Warts: No  Non-healing sores: No  Scarring: No  Flaking of skin: No  Color changes of hands/feet in cold : No  Sun sensitivity: Yes  Skin thickening: No  Ear pain: No  Ear discharge: No  Hearing loss: No  Tinnitus: No  Nosebleeds: No  Congestion: Yes  Sinus pain: No  Trouble swallowing: No   Voice hoarseness: Yes  Mouth sores: No  Sore throat: Yes  Tooth pain: No  Gum tenderness: No  Bleeding gums: No  Change in taste: Yes  Change in sense of smell: Yes  Dry mouth: No  Hearing aid used: No  Neck lump: No  Eye pain: No  Vision loss: No  Dry eyes: No  Watery eyes: No  Eye bulging: No  Double vision: No  Flashing of lights: No  Spots: No  Floaters: No  Redness: No  Crossed eyes: No  Tunnel Vision: No  Yellowing of eyes: No  Eye irritation: Yes  Cough: No  Sputum or phlegm: No  Coughing up blood: No  Difficulty breating or shortness of breath: No  Snoring: No  Wheezing: No  Difficulty breathing on exertion: No  Respiratory pain: No  Nighttime Cough: No  Difficulty breathing when lying flat: No  Trouble holding urine or incontinence: Yes  Pain or burning: Yes  Trouble starting or stopping: Yes  Increased frequency of urination: No  Blood in urine: Yes  Decreased frequency of urination: Yes  Frequent nighttime urination: Yes  Flank pain: No  Difficulty emptying bladder: Yes  Back pain: No  Muscle aches: No  Neck pain: No  Swollen joints: No  Joint pain: Yes  Bone pain: No  Muscle cramps: No  Muscle  weakness: No  Joint stiffness: No  Bone fracture: No      Called patient. TFT mildly elevated, most likely due to postpartum thyroiditis. Advised to continue to take the same dose of levothyroxine and have them rechecked in 2 months.        Again, thank you for allowing me to participate in the care of your patient.      Sincerely,    Jayne Zapata MD

## 2017-06-26 NOTE — NURSING NOTE
"Chief Complaint   Patient presents with     RECHECK     HASHIMOTO'S THYROIDITIS F/U        Initial BP 92/56 (BP Location: Right arm, Patient Position: Chair, Cuff Size: Adult Regular)  Pulse 75  Ht 1.651 m (5' 5\")  Wt 65.6 kg (144 lb 9.6 oz)  BMI 24.06 kg/m2 Estimated body mass index is 24.06 kg/(m^2) as calculated from the following:    Height as of this encounter: 1.651 m (5' 5\").    Weight as of this encounter: 65.6 kg (144 lb 9.6 oz).  Medication Reconciliation: complete    "

## 2017-06-27 ENCOUNTER — OFFICE VISIT (OUTPATIENT)
Dept: INTERNAL MEDICINE | Facility: CLINIC | Age: 37
End: 2017-06-27

## 2017-06-27 ENCOUNTER — TELEPHONE (OUTPATIENT)
Dept: INTERNAL MEDICINE | Facility: CLINIC | Age: 37
End: 2017-06-27

## 2017-06-27 VITALS
SYSTOLIC BLOOD PRESSURE: 107 MMHG | DIASTOLIC BLOOD PRESSURE: 70 MMHG | HEART RATE: 87 BPM | WEIGHT: 143.7 LBS | BODY MASS INDEX: 23.91 KG/M2

## 2017-06-27 DIAGNOSIS — R30.0 DYSURIA: Primary | ICD-10-CM

## 2017-06-27 DIAGNOSIS — R35.0 URINARY FREQUENCY: Primary | ICD-10-CM

## 2017-06-27 LAB
ALBUMIN UR-MCNC: NEGATIVE MG/DL
APPEARANCE UR: CLEAR
BILIRUB UR QL STRIP: NEGATIVE
COLOR UR AUTO: YELLOW
GLUCOSE UR STRIP-MCNC: NEGATIVE MG/DL
HGB UR QL STRIP: NEGATIVE
KETONES UR STRIP-MCNC: NEGATIVE MG/DL
LEUKOCYTE ESTERASE UR QL STRIP: NEGATIVE
MUCOUS THREADS #/AREA URNS LPF: PRESENT /LPF
NITRATE UR QL: NEGATIVE
PH UR STRIP: 5 PH (ref 5–7)
RBC #/AREA URNS AUTO: 1 /HPF (ref 0–2)
SP GR UR STRIP: 1.02 (ref 1–1.03)
SQUAMOUS #/AREA URNS AUTO: 1 /HPF (ref 0–1)
URN SPEC COLLECT METH UR: ABNORMAL
UROBILINOGEN UR STRIP-MCNC: 0 MG/DL (ref 0–2)
WBC #/AREA URNS AUTO: 1 /HPF (ref 0–2)

## 2017-06-27 RX ORDER — LEVOTHYROXINE SODIUM 75 UG/1
75 TABLET ORAL DAILY
Qty: 90 TABLET | Refills: 3 | Status: SHIPPED | OUTPATIENT
Start: 2017-06-27 | End: 2018-07-02

## 2017-06-27 ASSESSMENT — PAIN SCALES - GENERAL: PAINLEVEL: MILD PAIN (2)

## 2017-06-27 NOTE — PROGRESS NOTES
SUBJECTIVE:  Karen Beverly is a 37 year old female who presents today with   Chief Complaint   Patient presents with     UTI     pt states had UTI, still feels symptoms      Karen began experiencing urinary frequency, dysuria, and pain in the suprapubic & flank region two weeks ago. She went to urgent care on 6/11, and UA showed blood + leukocyte esterase (no nitrite). She was prescribed amoxicillin for 10 days and finished the course. However, she has still been experiencing the same symptoms with urination. She describe the pain symptoms as a sharp pain in suprapubic and groin region that comes and goes. She also has occasional pain that wraps to her back from the abdomen, especially on the right side. No known fevers. Her has menstrual period was about a year ago -- she is breast feeding and her baby is 3 months old. No known family history of kidney stones. She is concerned because she has plans to go to [place] in 1 week.     Medications and allergies were reviewed and updated in the chart.     SocHx:   History   Smoking Status     Never Smoker   Smokeless Tobacco     Never Used       Family history and PMH reviewed and updated in chart    Patient Active Problem List    Diagnosis Date Noted     Indication for care in labor or delivery 03/14/2017     Priority: Medium     Labor and delivery, indication for care 03/14/2017     Priority: Medium     Hashimoto's thyroiditis 12/24/2016     Priority: Medium     History of multiple miscarriages 03/01/2016     Priority: Medium     Thyroid disease, antepartum 09/19/2013     Priority: Medium     Elevated TSH 09/08/2013     Priority: Medium     Pregnancy related condition 02/12/2013       ROS   5 point ROS reviewed and is negative other than noted above.     /70  Pulse 87  Wt 65.2 kg (143 lb 11.2 oz)  BMI 23.91 kg/m2  Physical Exam   Constitutional: She is oriented to person, place, and time. No distress.   HENT:   Head: Normocephalic and atraumatic.   Neck:  Normal range of motion. Neck supple.   Pulmonary/Chest: Effort normal.   Abdominal: Soft. She exhibits no distension and no mass. There is no tenderness. There is no guarding.   Neurological: She is alert and oriented to person, place, and time.   Skin: Skin is warm.   Psychiatric: She has a normal mood and affect.       ASSESSMENT/PLAN:    Dysuria, increased urinary frequency, and associated suprapubic pain with unknown etiology. Will wait for UA results to see if there's an infection present. If no infection, will refer to urology for further workup.    Update: UA did not indicate infection.     Urinary frequency  - UA with Microscopic reflex to Culture    Scribe Disclosure:   I, Jeana Jennings MS4, am serving as a scribe; to document services personally performed by Dr. Priest- -based on data collection and the provider's statements to me.     Provider Disclosure:  I agree with above History, Review of Systems, Physical exam and Plan.  I have reviewed the content of the documentation and have edited it as needed. I have personally performed the services documented here and the documentation accurately represents those services and the decisions I have made.      Linda Priest MD

## 2017-06-27 NOTE — MR AVS SNAPSHOT
After Visit Summary   6/27/2017    Karen Beverly    MRN: [id number]           Patient Information     Date Of Birth          1980        Visit Information        Provider Department      6/27/2017 9:20 AM Linda Priest MD [facility]        Today's Diagnoses     Urinary frequency    -  1      Care Instructions    Primary Care Center Medication Refill Request Information:  * Please contact your pharmacy regarding ANY request for medication refills.  ** [facility] Prescription Fax = [phone]  * Please allow 3 business days for routine medication refills.  * Please allow 5 business days for controlled substance medication refills.     Primary Care Center Test Result notification information:  *You will be notified with in 7-10 days of your appointment day regarding the results of your test.  If you are on MyChart you will be notified as soon as the provider has reviewed the results and signed off on them.    [facility] [phone]             Follow-ups after your visit        Who to contact     Please call your clinic at [phone] to:    Ask questions about your health    Make or cancel appointments    Discuss your medicines    Learn about your test results    Speak to your doctor   If you have compliments or concerns about an experience at your clinic, or if you wish to file a complaint, please contact [facility] Patient Relations at [phone] or email us at [email or web address]         Additional Information About Your Visit        MyChart Information     [employer] gives you secure access to your electronic health record. If you see a primary care provider, you can also send messages to your care team and make appointments. If you have questions, please call your primary care clinic.  If you do not have a primary care provider, please call [phone] and they will assist you.      [employer] is an electronic gateway that  provides easy, online access to your medical records. With [employer], you can request a clinic appointment, read your test results, renew a prescription or communicate with your care team.     To access your existing account, please contact your [facility] or call [phone] for assistance.        Care EveryWhere ID     This is your Care EveryWhere ID. This could be used by other organizations to access your [place] medical records  [id number]        Your Vitals Were     Pulse BMI (Body Mass Index)                87 23.91 kg/m2           Blood Pressure from Last 3 Encounters:   06/27/17 107/70   06/26/17 92/56   06/11/17 113/69    Weight from Last 3 Encounters:   06/27/17 65.2 kg (143 lb 11.2 oz)   06/26/17 65.6 kg (144 lb 9.6 oz)   06/11/17 65.8 kg (145 lb)              We Performed the Following     UA with Microscopic reflex to Culture          Today's Medication Changes          These changes are accurate as of: 6/27/17 11:59 PM.  If you have any questions, ask your nurse or doctor.               Stop taking these medicines if you haven't already. Please contact your care team if you have questions.     phenazopyridine 200 MG tablet   Commonly known as:  PYRIDIUM   Stopped by:  Linda Priest MD                    Primary Care Provider Office Phone # Fax #    Lars Burt [phone]       [address] [address]        Equal Access to Services     [facility] : Maulik Zacarias, warbigette hernandez, qaybta kaaltonya albert, maria isabel brewer. So [facility] [phone].    ATENCIÓN: Si habla español, tiene a christiansen disposición servicios gratuitos de asistencia lingüística. Llame al [phone].    We comply with applicable federal civil rights laws and [place] laws. We do not discriminate on the basis of race, color, national origin, age, disability sex, sexual orientation or gender  identity.            Thank you!     Thank you for choosing [facility]  for your care. Our goal is always to provide you with excellent care. Hearing back from our patients is one way we can continue to improve our services. Please take a few minutes to complete the written survey that you may receive in the mail after your visit with us. Thank you!             Your Updated Medication List - Protect others around you: Learn how to safely use, store and throw away your medicines at www.disposemymeds.org.          This list is accurate as of: 6/27/17 11:59 PM.  Always use your most recent med list.                   Brand Name Dispense Instructions for use Diagnosis    levothyroxine 75 MCG tablet    SYNTHROID/LEVOTHROID    90 tablet    Take 1 tablet (75 mcg) by mouth daily    Hashimoto's thyroiditis       PRENATAL VITAMINS PO      Take 1 tablet by mouth daily.

## 2017-06-27 NOTE — PROGRESS NOTES
Called patient. TFT mildly elevated, most likely due to postpartum thyroiditis. Advised to continue to take the same dose of levothyroxine and have them rechecked in 2 months.

## 2017-06-27 NOTE — PATIENT INSTRUCTIONS
[facility] Medication Refill Request Information:  * Please contact your pharmacy regarding ANY request for medication refills.  ** [facility] Prescription Fax = [phone]  * Please allow 3 business days for routine medication refills.  * Please allow 5 business days for controlled substance medication refills.     [facility] Test Result notification information:  *You will be notified with in 7-10 days of your appointment day regarding the results of your test.  If you are on MyChart you will be notified as soon as the provider has reviewed the results and signed off on them.    [facility] [phone]

## 2017-06-27 NOTE — NURSING NOTE
Chief Complaint   Patient presents with     UTI     pt states had UTI, still feels symptoms     Ana Paula Pardo CMA at 9:32 AM on 6/27/2017.

## 2017-06-27 NOTE — TELEPHONE ENCOUNTER
UA reviewed, no evidence of bacterial infection present. MAy consider Urology evaluation of follow up with OB/GYN.  Linda Priest MD

## 2017-06-28 NOTE — TELEPHONE ENCOUNTER
Spoke with patient and review results with her. She will be leaving the country soon and doesn't have time for Urology referral at this time. Will call when she gets back to [place] if she feels that she is still needing further follow-up at that time.

## 2017-07-17 DIAGNOSIS — E06.3 HASHIMOTO'S THYROIDITIS: ICD-10-CM

## 2017-07-19 RX ORDER — LEVOTHYROXINE SODIUM 75 UG/1
TABLET ORAL
Qty: 108 TABLET | Refills: 2 | OUTPATIENT
Start: 2017-07-19

## 2017-08-24 DIAGNOSIS — E06.3 HASHIMOTO'S THYROIDITIS: ICD-10-CM

## 2017-08-24 LAB
T4 FREE SERPL-MCNC: 0.87 NG/DL (ref 0.76–1.46)
TSH SERPL DL<=0.005 MIU/L-ACNC: 1.68 MU/L (ref 0.4–4)

## 2017-08-24 PROCEDURE — 36415 COLL VENOUS BLD VENIPUNCTURE: CPT | Performed by: INTERNAL MEDICINE

## 2017-08-24 PROCEDURE — 84439 ASSAY OF FREE THYROXINE: CPT | Performed by: INTERNAL MEDICINE

## 2017-08-24 PROCEDURE — 84443 ASSAY THYROID STIM HORMONE: CPT | Performed by: INTERNAL MEDICINE

## 2017-09-05 ENCOUNTER — OFFICE VISIT (OUTPATIENT)
Dept: FAMILY MEDICINE | Facility: CLINIC | Age: 37
End: 2017-09-05
Payer: COMMERCIAL

## 2017-09-05 VITALS
HEART RATE: 92 BPM | WEIGHT: 143 LBS | OXYGEN SATURATION: 100 % | TEMPERATURE: 97.2 F | RESPIRATION RATE: 15 BRPM | SYSTOLIC BLOOD PRESSURE: 110 MMHG | BODY MASS INDEX: 23.8 KG/M2 | DIASTOLIC BLOOD PRESSURE: 68 MMHG

## 2017-09-05 DIAGNOSIS — J30.1 CHRONIC SEASONAL ALLERGIC RHINITIS DUE TO POLLEN: ICD-10-CM

## 2017-09-05 DIAGNOSIS — J01.90 ACUTE SINUSITIS WITH SYMPTOMS > 10 DAYS: Primary | ICD-10-CM

## 2017-09-05 PROCEDURE — 99213 OFFICE O/P EST LOW 20 MIN: CPT | Performed by: PHYSICIAN ASSISTANT

## 2017-09-05 RX ORDER — AMOXICILLIN 500 MG/1
500 CAPSULE ORAL 3 TIMES DAILY
Qty: 30 CAPSULE | Refills: 0 | Status: SHIPPED | OUTPATIENT
Start: 2017-09-05 | End: 2017-09-15

## 2017-09-05 ASSESSMENT — ENCOUNTER SYMPTOMS
HEADACHES: 1
CARDIOVASCULAR NEGATIVE: 1
GASTROINTESTINAL NEGATIVE: 1
RESPIRATORY NEGATIVE: 1
WEIGHT LOSS: 0
DIAPHORESIS: 0
EYE PAIN: 0
FEVER: 0
CHILLS: 1
HEMOPTYSIS: 0
PALPITATIONS: 0
COUGH: 0

## 2017-09-05 NOTE — NURSING NOTE
"Chief Complaint   Patient presents with     Sinus Problem       Initial /68  Pulse 92  Temp 97.2  F (36.2  C) (Oral)  Resp 15  Wt 143 lb (64.9 kg)  SpO2 100%  Breastfeeding? No  BMI 23.8 kg/m2 Estimated body mass index is 23.8 kg/(m^2) as calculated from the following:    Height as of 6/26/17: 5' 5\" (1.651 m).    Weight as of this encounter: 143 lb (64.9 kg).  Medication Reconciliation: complete   Jessica Rascon MA      "

## 2017-09-05 NOTE — MR AVS SNAPSHOT
After Visit Summary   9/5/2017    Karen Beverly    MRN: [id number]           Patient Information     Date Of Birth          1980        Visit Information        Provider Department      9/5/2017 11:20 AM Aviva Alfaro PA-C [facility]        Today's Diagnoses     Acute sinusitis with symptoms > 10 days    -  1    Chronic seasonal allergic rhinitis due to pollen           Follow-ups after your visit        Additional Services     ALLERGY/ASTHMA ADULT REFERRAL       [place] [facility] [place] [facility]  [phone] [email or web address]  [place] [facility] [phone]  [email or web address]    Please be aware that coverage of these services is subject to the terms and limitations of your health insurance plan.  Call member services at your health plan with any benefit or coverage questions.      Please bring the following with you to your appointment:    (1) Any X-Rays, CTs or MRIs which have been performed.  Contact the facility where they were done to arrange for  prior to your scheduled appointment.    (2) List of current medications  (3) This referral request   (4) Any documents/labs given to you for this referral                  Who to contact     If you have questions or need follow up information about today's clinic visit or your schedule please contact [facility] directly at [phone].  Normal or non-critical lab and imaging results will be communicated to you by MyChart, letter or phone within 4 business days after the clinic has received the results. If you do not hear from us within 7 days, please contact the clinic through MyChart or phone. If you have a critical or abnormal lab result, we will notify you by phone as soon as possible.  Submit refill requests through [employer] or call your pharmacy and they will forward the refill request to us. Please  allow 3 business days for your refill to be completed.          Additional Information About Your Visit        MyChart Information     [employer] gives you secure access to your electronic health record. If you see a primary care provider, you can also send messages to your care team and make appointments. If you have questions, please call your primary care clinic.  If you do not have a primary care provider, please call [phone] and they will assist you.        Care EveryWhere ID     This is your Care EveryWhere ID. This could be used by other organizations to access your [place] medical records  [id number]        Your Vitals Were     Pulse Temperature Respirations Pulse Oximetry Breastfeeding? BMI (Body Mass Index)    92 97.2  F (36.2  C) (Oral) 15 100% No 23.8 kg/m2       Blood Pressure from Last 3 Encounters:   09/05/17 110/68   06/27/17 107/70   06/26/17 92/56    Weight from Last 3 Encounters:   09/05/17 143 lb (64.9 kg)   06/27/17 143 lb 11.2 oz (65.2 kg)   06/26/17 144 lb 9.6 oz (65.6 kg)              We Performed the Following     ALLERGY/ASTHMA ADULT REFERRAL          Today's Medication Changes          These changes are accurate as of: 9/5/17 11:36 AM.  If you have any questions, ask your nurse or doctor.               Start taking these medicines.        Dose/Directions    amoxicillin 500 MG capsule   Commonly known as:  AMOXIL   Used for:  Acute sinusitis with symptoms > 10 days   Started by:  Aviva Alfaro PA-C        Dose:  500 mg   Take 1 capsule (500 mg) by mouth 3 times daily for 10 days   Quantity:  30 capsule   Refills:  0            Where to get your medicines      These medications [id number] [place] - [address]  [address] [place] [address]     Phone:  [phone]     amoxicillin 500 MG capsule                Primary Care Provider Office Phone # Fax #    Lars Burt [phone]       [address] [facility]  [address]        Equal Access to Services     [facility]: Hadii aad ku hadericaraj Emali, waprettytulio sawyermichelineha, qatashbailee zunigaterrymaria isabel humphrey. So [facility] [phone].    ATENCIÓN: Si habla español, tiene a christiansen disposición servicios gratuitos de asistencia lingüística. Llame al [phone].    We comply with applicable federal civil rights laws and [place] laws. We do not discriminate on the basis of race, color, national origin, age, disability sex, sexual orientation or gender identity.            Thank you!     Thank you for choosing [facility]  for your care. Our goal is always to provide you with excellent care. Hearing back from our patients is one way we can continue to improve our services. Please take a few minutes to complete the written survey that you may receive in the mail after your visit with us. Thank you!             Your Updated Medication List - Protect others around you: Learn how to safely use, store and throw away your medicines at www.disposemymeds.org.          This list is accurate as of: 9/5/17 11:36 AM.  Always use your most recent med list.                   Brand Name Dispense Instructions for use Diagnosis    amoxicillin 500 MG capsule    AMOXIL    30 capsule    Take 1 capsule (500 mg) by mouth 3 times daily for 10 days    Acute sinusitis with symptoms > 10 days       levothyroxine 75 MCG tablet    SYNTHROID/LEVOTHROID    90 tablet    Take 1 tablet (75 mcg) by mouth daily    Hashimoto's thyroiditis       PRENATAL VITAMINS PO      Take 1 tablet by mouth daily.

## 2017-10-02 ENCOUNTER — OFFICE VISIT (OUTPATIENT)
Dept: ALLERGY | Facility: CLINIC | Age: 37
End: 2017-10-02
Payer: COMMERCIAL

## 2017-10-02 VITALS
BODY MASS INDEX: 23.49 KG/M2 | WEIGHT: 141 LBS | OXYGEN SATURATION: 97 % | HEIGHT: 65 IN | SYSTOLIC BLOOD PRESSURE: 100 MMHG | HEART RATE: 84 BPM | DIASTOLIC BLOOD PRESSURE: 62 MMHG

## 2017-10-02 DIAGNOSIS — J32.9 CHRONIC SINUSITIS, UNSPECIFIED LOCATION: ICD-10-CM

## 2017-10-02 DIAGNOSIS — H10.9 RHINOCONJUNCTIVITIS: Primary | ICD-10-CM

## 2017-10-02 DIAGNOSIS — J31.0 RHINOCONJUNCTIVITIS: Primary | ICD-10-CM

## 2017-10-02 PROCEDURE — 95004 PERQ TESTS W/ALRGNC XTRCS: CPT | Performed by: ALLERGY & IMMUNOLOGY

## 2017-10-02 PROCEDURE — 99243 OFF/OP CNSLTJ NEW/EST LOW 30: CPT | Mod: 25 | Performed by: ALLERGY & IMMUNOLOGY

## 2017-10-02 NOTE — PATIENT INSTRUCTIONS
Allergy Staff Appt Hours Shot Hours Locations    Physician     Doyle Ring DO       Support Staff     Bere MCKENNA RN      Veena BETANCOURT MA  Monday:                      [place] 8-7     Tuesday:         [place] 8-5     Wednesday:        [place]: 7-5     Friday:        [place] 7-5   [place]        Monday: 9-6        Friday: 7-2     [place]        Tuesday: 7-11 Thursday: 1:30-7     [place]        Tuesday: 1-7        Wednesday: 11-6 Thursday: 7-12 [facility]  [address] [place] [address]  Appt Line: [phone]  Allergy RN (Monday):  [phone]    [facility]  [address] [place] [address]  Appt Line: [phone]  Allergy RN (Tues & Wed):  [phone]    [facility]  [address] [place] [address]  Appt Line: [phone]  Allergy RN (Friday):  [phone]       Important Scheduling Information  Aspirin Desensitization: Appt will last 2 clinic days. Please call the Allergy RN line for your clinic to schedule. Discontinue antihistamines 7 days prior to the appointment.     Food Challenges: Appt will last 3-4 hours. Please call the Allergy RN line for your clinic to schedule. Discontinue antihistamines 7 days prior to the appointment.     Penicillin Testing: Appt will last 2-3 hours. Please call the Allergy RN line for your clinic to schedule. Discontinue antihistamines 7 days prior to the appointment.     Skin Testing: Appt will about 40 minutes. Call the appointment line for your clinic to schedule. Discontinue antihistamines 7 days prior to the appointment.     Venom Testing: Appt will last 2-3 hours. Please call the Allergy RN line for your clinic to schedule. Discontinue antihistamines 7 days prior to the appointment.     Thank you for trusting us with your Allergy, Asthma, and Immunology care. Please feel free to contact us with any questions or concerns you may have.      - Start Flonase 2 sprays per nostril daily.  - Consider CT scan of  sinuses versus referral to ENT physician. Please discuss with your family and let us know.  - Return to clinic in 2 weeks.

## 2017-10-02 NOTE — Clinical Note
I saw your patient today in consultation. Please see the attached note for full details and recommendations from the consultation. I appreciate you trusting [facility] to provide care to your patients.  Doyle Ring, DO [facility]

## 2017-10-02 NOTE — NURSING NOTE
"Chief Complaint   Patient presents with     Consult     allergy       Initial /62 (BP Location: Right arm, Patient Position: Sitting, Cuff Size: Adult Regular)  Pulse 84  Ht 5' 5.08\" (1.653 m)  Wt 141 lb (64 kg)  SpO2 97%  BMI 23.41 kg/m2 Estimated body mass index is 23.41 kg/(m^2) as calculated from the following:    Height as of this encounter: 5' 5.08\" (1.653 m).    Weight as of this encounter: 141 lb (64 kg).  Medication Reconciliation: complete   Veena Santos MA      "

## 2017-10-02 NOTE — ASSESSMENT & PLAN NOTE
Persistent nasal and ocular symptoms. Symptoms additionally include sinus pressure, decreased sense of smell, decreased taste and yellow to green mucus. Negative allergy testing today. Claritin not beneficial. Flonase was used only on a couple of occasions.    Allergy skin testing was negative.    - Concern for chronic sinusitis. Discussed imaging for CT scan of sinuses versus ENT referral. She will consider both options and let us know how she wishes to proceed.  - Start Flonase 2 sprays per nostril daily.

## 2017-10-02 NOTE — MR AVS SNAPSHOT
After Visit Summary   10/2/2017    Karen Beverly    MRN: [id number]           Patient Information     Date Of Birth          1980        Visit Information        Provider Department      10/2/2017 10:20 AM Doyle Ring DO [facility] [place] Instructions    Allergy Staff Appt Hours Shot Hours Locations    Physician     Doyle Ring DO       Support Staff     CHERIE Brock MA  Monday:                      [place] 8-7     Tuesday:         [place] 8-5 Wednesday:        [place]: 7-5     Friday:        [place] 7-5   [place]        Monday: 9-6 Friday: 7-2     [place]        Tuesday: 7-11 Thursday: 1:30-7     [place]        Tuesday: 1-7 Wednesday: 11-6 Thursday: 7-12 [facility]  [address] [place] [address]  Appt Line: [phone]  Allergy RN (Monday):  [phone]    [facility]  [address] [place] [address]  Appt Line: [phone]  Allergy RN (Tues & Wed):  [phone]    [facility]  [address] [place] [address]  Appt Line: [phone]  Allergy RN (Friday):  [phone]       Important Scheduling Information  Aspirin Desensitization: Appt will last 2 clinic days. Please call the Allergy RN line for your clinic to schedule. Discontinue antihistamines 7 days prior to the appointment.     Food Challenges: Appt will last 3-4 hours. Please call the Allergy RN line for your clinic to schedule. Discontinue antihistamines 7 days prior to the appointment.     Penicillin Testing: Appt will last 2-3 hours. Please call the Allergy RN line for your clinic to schedule. Discontinue antihistamines 7 days prior to the appointment.     Skin Testing: Appt will about 40 minutes. Call the appointment line for your clinic to schedule. Discontinue antihistamines 7 days prior to the appointment.     Venom Testing: Appt will last 2-3 hours. Please call the Allergy RN line for your  "clinic to schedule. Discontinue antihistamines 7 days prior to the appointment.     Thank you for trusting us with your Allergy, Asthma, and Immunology care. Please feel free to contact us with any questions or concerns you may have.      - Start Flonase 2 sprays per nostril daily.  - Consider CT scan of sinuses versus referral to ENT physician. Please discuss with your family and let us know.  - Return to clinic in 2 weeks.           Follow-ups after your visit        Follow-up notes from your care team     Return in about 2 weeks (around 10/16/2017).      Who to contact     If you have questions or need follow up information about today's clinic visit or your schedule please contact [facility] directly at [phone].  Normal or non-critical lab and imaging results will be communicated to you by [employer], letter or phone within 4 business days after the clinic has received the results. If you do not hear from us within 7 days, please contact the clinic through [employer] or phone. If you have a critical or abnormal lab result, we will notify you by phone as soon as possible.  Submit refill requests through [employer] or call your pharmacy and they will forward the refill request to us. Please allow 3 business days for your refill to be completed.          Additional Information About Your Visit        [employer] Information     [employer] gives you secure access to your electronic health record. If you see a primary care provider, you can also send messages to your care team and make appointments. If you have questions, please call your primary care clinic.  If you do not have a primary care provider, please call [phone] and they will assist you.        Care EveryWhere ID     This is your Care EveryWhere ID. This could be used by other organizations to access your [place] medical records  [id number]        Your Vitals Were     Pulse Height Pulse Oximetry BMI (Body Mass Index)          84 5' 5.08\" (1.653 " m) 97% 23.41 kg/m2         Blood Pressure from Last 3 Encounters:   10/02/17 100/62   09/05/17 110/68   06/27/17 107/70    Weight from Last 3 Encounters:   10/02/17 141 lb (64 kg)   09/05/17 143 lb (64.9 kg)   06/27/17 143 lb 11.2 oz (65.2 kg)              Today, you had the following     No orders found for display       Primary Care Provider Office Phone # Fax #    Lars Burt [phone] [address] [facility] [address]        Equal Access to Services     [facility]: Hadii jose m ling hadcallie Sobhargavi, waaxda luretaadaha, qaybta kaalmada bety, maria isabel moreno . So [facility] [phone].    ATENCIÓN: Si habla español, tiene a chrsitiansen disposición servicios gratuitos de asistencia lingüística. Llame al [phone].    We comply with applicable federal civil rights laws and [place] laws. We do not discriminate on the basis of race, color, national origin, age, disability, sex, sexual orientation, or gender identity.            Thank you!     Thank you for choosing [facility]  for your care. Our goal is always to provide you with excellent care. Hearing back from our patients is one way we can continue to improve our services. Please take a few minutes to complete the written survey that you may receive in the mail after your visit with us. Thank you!             Your Updated Medication List - Protect others around you: Learn how to safely use, store and throw away your medicines at www.disposemymeds.org.          This list is accurate as of: 10/2/17 11:43 AM.  Always use your most recent med list.                   Brand Name Dispense Instructions for use Diagnosis    levothyroxine 75 MCG tablet    SYNTHROID/LEVOTHROID    90 tablet    Take 1 tablet (75 mcg) by mouth daily    Hashimoto's thyroiditis       PRENATAL VITAMINS PO      Take 1 tablet by mouth daily.

## 2017-10-02 NOTE — PROGRESS NOTES
Karen Beverly is a 37 year old White female with previous medical history significant for hypothyroidism. Karen Beverly is being seen today for evaluation of seasonal allergies. The patient is being seen in consultation at the request of Aviva Alfaro PA-C.     The patient reports that starting in the spring of 2016 she began having persistent nasal and ocular symptoms. Symptoms 2017 started at the end of February and have been present some are and now fall. Symptoms include rhinorrhea, nasal itching, sneezing, stuffy nose, postnasal drainage, yellow nasal discharge, sinus pressure, ocular itching, ocular watering and ocular redness. She has been treated for sinusitis on 4 separate occasions which has been minimally beneficial. She has not had a CT scan of her sinuses. No history of nasal polyposis. No history of sinus surgery. She has tried Flonase, but only used on a few occasions and did not find this to be beneficial. Claritin has not been beneficial. She has not been seen by ENT doctor. She has used azelastine eyedrops which have been beneficial. Symptoms have been made worse with mowing grass, raking leaves, dust. This has been associated with a cough that has been productive. No history of asthma. Denies shortness of breath, wheezing or tightness in chest. No history of allergy testing.     The patient has no history of asthma, eczema, food allergies, medications allergies or hives.     ENVIRONMENTAL HISTORY: The family lives in a [facility] [place] The home is heated with a forced air. They does have central air conditioning. The patient's bedroom is furnished with feather/wool bedding or pillows and carpeting in bedroom.  Pets inside the house include 0 . There is not history of cockroach or mice infestation. There is/are 0 smokers in the house.  The house does not have a damp basement.         Past Medical History:   Diagnosis Date     Eczema      Endometriosis      Thyroid disease       Family History   Problem Relation Age of Onset     Hypertension Mother      CANCER Other      liver cancer     Past Surgical History:   Procedure Laterality Date     DAVINCI PELVIC PROCEDURE  2/22/2012    Procedure:DAVINCI PELVIC PROCEDURE; DAVINCI DIAGNOSTIC PELVISCOPY WITH OMNIGUIDE C02 LASER, DIAGNOSTIC HYSTEROSCOPY, EXTENSIVE LYSIS EXCISION OF ENDOMETRIOSIS, BILATERAL URETEROLYSIS, D & C; Surgeon:JOHN KELLY; Location: OR       REVIEW OF SYSTEMS:  General: negative for weight gain. negative for weight loss. negative for changes in sleep.   Ears: negative for fullness. negative for hearing loss. negative for dizziness.   Nose: negative for snoring.positive  for changes in smell. negative for drainage.   Eyes: negative for eye watering. negative for eye itching. negative for vision changes. negative for eye redness.  Throat: positive  for hoarseness. positive  for sore throat. negative for trouble swallowing.   Lungs: negative for shortness of breath.negative for wheezing. positive  for sputum production.   Cardiovascular: negative for chest pain. negative for swelling of ankles. negative for fast or irregular heartbeat.   Gastrointestinal: negative for nausea. negative for heartburn. negative for acid reflux.   Musculoskeletal: positive  for joint pain. positive  for joint stiffness. negative for joint swelling.   Neurologic: negative for seizures. negative for fainting. positive  for weakness.   Psychiatric: negative for changes in mood. negative for anxiety.   Endocrine: negative for cold intolerance. negative for heat intolerance. negative for tremors.   Lymphatic: negative for lower extremity swelling. negative for lymph node swelling.   Hematologic: negative for easy bruising. negative for easy bleeding.  Integumentary: positive  for rash. negative for scaling. negative for nail changes.       Current Outpatient Prescriptions:      levothyroxine (SYNTHROID/LEVOTHROID) 75 MCG tablet, Take 1  tablet (75 mcg) by mouth daily, Disp: 90 tablet, Rfl: 3     PRENATAL VITAMINS PO, Take 1 tablet by mouth daily.  , Disp: , Rfl:   There is no immunization history for the selected administration types on file for this patient.  Allergies   Allergen Reactions     Augmentin Nausea and Vomiting     Latex Rash         EXAM:   Constitutional:  Appears well-developed and well-nourished. No distress.   HEENT:   Head: Normocephalic.   Right Ear: External ear normal. TM normal  Left Ear: External ear normal. TM normal  Mouth/Throat: No oropharyngeal exudate present.   Cobblestoning of posterior oropharynx.   Boggy nasal tissue and pale.    Eyes: Conjunctivae are non-erythematous   No maxillary or frontal sinus tenderness to palpation.   Cardiovascular: Normal rate, regular rhythm and normal heart sounds. Exam reveals no gallop and no friction rub.   No murmur heard.  Respiratory: Effort normal and breath sounds normal. No respiratory distress. No wheezes. No rales.   Musculoskeletal: Normal range of motion.   Lymphadenopathy:   No cervical adenopathy.   No lower extremity edema.   Neuro: Oriented to person, place, and time.  Skin: Skin is warm and dry. No rash noted.   Psychiatric: Normal mood and affect.     Nursing note and vitals reviewed.      WORKUP:   Skin testing  Negative for environmental allergies.    ASSESSMENT/PLAN:  Problem List Items Addressed This Visit        Respiratory    Chronic sinusitis, unspecified location       Infectious/Inflammatory    Rhinoconjunctivitis - Primary     Persistent nasal and ocular symptoms. Symptoms additionally include sinus pressure, decreased sense of smell, decreased taste and yellow to green mucus. Negative allergy testing today. Claritin not beneficial. Flonase was used only on a couple of occasions.    Allergy skin testing was negative.    - Concern for chronic sinusitis. Discussed imaging for CT scan of sinuses versus ENT referral. She will consider both options and let us  know how she wishes to proceed.  - Start Flonase 2 sprays per nostril daily.         Relevant Orders    ALLERGY SKIN TESTS,ALLERGENS (Completed)          Chart documentation with Dragon Voice recognition Software. Although reviewed after completion, some words and grammatical errors may remain.    Doyle Ring,    Allergy/Immunology  [facility], [place] and [place], [place]

## 2017-11-29 ENCOUNTER — OFFICE VISIT (OUTPATIENT)
Dept: FAMILY MEDICINE | Facility: CLINIC | Age: 37
End: 2017-11-29

## 2017-11-29 VITALS
WEIGHT: 141.8 LBS | SYSTOLIC BLOOD PRESSURE: 105 MMHG | DIASTOLIC BLOOD PRESSURE: 68 MMHG | BODY MASS INDEX: 23.54 KG/M2 | HEART RATE: 80 BPM

## 2017-11-29 DIAGNOSIS — R10.11 ABDOMINAL PAIN, RIGHT UPPER QUADRANT: Primary | ICD-10-CM

## 2017-11-29 DIAGNOSIS — R19.5 LOOSE STOOLS: ICD-10-CM

## 2017-11-29 DIAGNOSIS — K21.00 GASTROESOPHAGEAL REFLUX DISEASE WITH ESOPHAGITIS: ICD-10-CM

## 2017-11-29 ASSESSMENT — PAIN SCALES - GENERAL: PAINLEVEL: MODERATE PAIN (4)

## 2017-11-29 NOTE — MR AVS SNAPSHOT
After Visit Summary   11/29/2017    Karen Beverly    MRN: [id number]           Patient Information     Date Of Birth          1980        Visit Information        Provider Department      11/29/2017 6:00 PM Lars Burt MD [facility]        Today's Diagnoses     Abdominal pain, right upper quadrant    -  1    Gastroesophageal reflux disease with esophagitis        Loose stools          Care Instructions    Primary Care Center: [phone]     Primary Care Center Medication Refill Request Information:  * Please contact your pharmacy regarding ANY request for medication refills.  ** [facility] Prescription Fax = [phone]  * Please allow 3 business days for routine medication refills.  * Please allow 5 business days for controlled substance medication refills.     Primary Care Center Test Result notification information:  *You will be notified with in 7-10 days of your appointment day regarding the results of your test.  If you are on MyChart you will be notified as soon as the provider has reviewed the results and signed off on them.            Follow-ups after your visit        Follow-up notes from your care team     Write patient with results Return in about 2 weeks (around 12/13/2017).      Your next 10 appointments already scheduled     Dec 05, 2017  1:15 PM CST   LAB with  LAB   [facility] ([facility])    [address] [place] [id number] [phone]           Please do not eat 10-12 hours before your appointment if you are coming in fasting for labs on lipids, cholesterol, or glucose (sugar). This does not apply to pregnant women. Water, hot tea and black coffee (with nothing added) are okay. Do not drink other fluids, diet soda or chew gum.            Dec 05, 2017  1:45 PM CST   US ABDOMEN COMPLETE with [address] [place] [address] Imaging Center US ([facility])    [address] [place] [address]  [facility] [id number] [phone]           Please bring a list of your medicines (including vitamins, minerals and over-the-counter drugs). Also, tell your doctor about any allergies you may have. Wear comfortable clothes and leave your valuables at home.  Adults: No eating or drinking for 8 hours before the exam. You may take medicine with a small sip of water.  Children: - Children 6+ years: No food or drink for 6 hours before exam. - Children 1-5 years: No food or drink for 4 hours before exam. - Infants, breast-fed: may have breast milk up to 2 hours before exam. - Infants, formula: may have bottle until 4 hours before exam.  Please call the Imaging Department at your exam site with any questions.            Dec 13, 2017  3:00 PM CST   (Arrive by 2:45 PM)   Return Visit with Lars Burt MD   [facility] ([facility])    [address]  [address] [facility] [id number] [phone]              Future tests that were ordered for you today     Open Future Orders        Priority Expected Expires Ordered    Clostridium difficile toxin B PCR Routine  11/29/2018 11/29/2017    US Abdomen complete Routine 11/30/2017 11/29/2018 11/29/2017    Comprehensive metabolic panel Routine 11/29/2017 11/29/2018 11/29/2017    CBC with platelets differential Routine 11/29/2017 12/13/2017 11/29/2017    TSH with free T4 reflex Routine 11/29/2017 11/29/2018 11/29/2017    Tissue transglutaminase preet IgA and IgG Routine 11/29/2017 11/29/2018 11/29/2017    Fecal cancer screen FIT Routine 11/29/2017 2/27/2018 11/29/2017            Who to contact     Please call your clinic at [phone] to:    Ask questions about your health    Make or cancel appointments    Discuss your medicines    Learn about your test results    Speak to your doctor   If you have compliments or concerns about an experience at your clinic, or if you wish to file a complaint, please contact  [facility] Patient Relations at [phone] or email us at [email or web address]         Additional Information About Your Visit        [employer] Information     [employer] gives you secure access to your electronic health record. If you see a primary care provider, you can also send messages to your care team and make appointments. If you have questions, please call your primary care clinic.  If you do not have a primary care provider, please call [phone] and they will assist you.      [employer] is an electronic gateway that provides easy, online access to your medical records. With [employer], you can request a clinic appointment, read your test results, renew a prescription or communicate with your care team.     To access your existing account, please contact your [facility] or call [phone] for assistance.        Care EveryWhere ID     This is your Care EveryWhere ID. This could be used by other organizations to access your [place] medical records  [id number]        Your Vitals Were     Pulse BMI (Body Mass Index)                80 23.54 kg/m2           Blood Pressure from Last 3 Encounters:   11/29/17 105/68   10/02/17 100/62   09/05/17 110/68    Weight from Last 3 Encounters:   11/29/17 64.3 kg (141 lb 12.8 oz)   10/02/17 64 kg (141 lb)   09/05/17 64.9 kg (143 lb)                 Today's Medication Changes          These changes are accurate as of: 11/29/17  7:16 PM.  If you have any questions, ask your nurse or doctor.               Start taking these medicines.        Dose/Directions    omeprazole 20 MG CR capsule   Commonly known as:  priLOSEC   Used for:  Abdominal pain, right upper quadrant, Gastroesophageal reflux disease with esophagitis   Started by:  Lars Burt MD        Dose:  20 mg   Take 1 capsule (20 mg) by mouth daily In morning   Quantity:  30 capsule   Refills:  1            Where to get your medicines      These  medications were sent to [facility] [id number] [facility] - [place]ENNIE [place]ERTSON - [address]  [address], [place] [address]     Phone:  [phone]     omeprazole 20 MG CR capsule                Primary Care Provider Office Phone # Fax #    Lars Burt [phone]        [facility] [address]        Equal Access to Services     [facility]: Hadii aad ku hadasho Soomaali, waaxda luqadaha, qaybta kaalmada adeegyada, waxay idiin hayaan adeeg khisissh ladaphnemyrtle . So [facility] [phone].    ATENCIÓN: Si ckla espcandido, tiene a christiansen disposición servicios gratuitos de asistencia lingüística. [facility] [phone].    We comply with applicable federal civil rights laws and [place] laws. We do not discriminate on the basis of race, color, national origin, age, disability, sex, sexual orientation, or gender identity.            Thank you!     Thank you for choosing [facility]  for your care. Our goal is always to provide you with excellent care. Hearing back from our patients is one way we can continue to improve our services. Please take a few minutes to complete the written survey that you may receive in the mail after your visit with us. Thank you!             Your Updated Medication List - Protect others around you: Learn how to safely use, store and throw away your medicines at www.disposemymeds.org.          This list is accurate as of: 11/29/17  7:16 PM.  Always use your most recent med list.                   Brand Name Dispense Instructions for use Diagnosis    levothyroxine 75 MCG tablet    SYNTHROID/LEVOTHROID    90 tablet    Take 1 tablet (75 mcg) by mouth daily    Hashimoto's thyroiditis       omeprazole 20 MG CR capsule    priLOSEC    30 capsule    Take 1 capsule (20 mg) by mouth daily In morning    Abdominal pain, right upper quadrant, Gastroesophageal reflux disease with esophagitis       PRENATAL VITAMINS PO      Take 1 tablet by mouth daily.

## 2017-11-30 NOTE — PATIENT INSTRUCTIONS
[facility]: [phone]     [facility] Medication Refill Request Information:  * Please contact your pharmacy regarding ANY request for medication refills.  ** [facility] Prescription Fax = [phone]  * Please allow 3 business days for routine medication refills.  * Please allow 5 business days for controlled substance medication refills.     [facility] Test Result notification information:  *You will be notified with in 7-10 days of your appointment day regarding the results of your test.  If you are on MyChart you will be notified as soon as the provider has reviewed the results and signed off on them.

## 2017-11-30 NOTE — PROGRESS NOTES
HPI:  Karen Beverly is a 37 year old female who presents today with a c/o  Stomach pain burning or sharp and increase in gas for several months worse if she does not eat therefore she eats then notes pain again. She feels she is eating too much. She has constipation but has cramps and needs to have a bowel movement in the middle of the night not diarrhea. She has a lot of gas and pain in the right side. She feels burning with a bowel movement. She cooks at home eats fruits. No one has symptoms. Sometimes she'll notice a dark greenish color to her bowel movement and sometimes dark brownish maroon color.  She was in [place] over the summer.  Baby boy Schuyler born March 14 vaginal delivery.  Today pain is 4/10 up to 7/10.  She does not take medication for pain.  She is concerned about too much acid.  No alcohol and is breast feeding.  She has had recent antibiotics for infections. She has had several sinus infections requiring antibiotics but symptoms occurred prior to this. She has a history of Hashimoto's thyroiditis and thinks that she is on the correct dose of thyroid medication.    Patient Active Problem List   Diagnosis     Pregnancy related condition     Elevated TSH     Thyroid disease, antepartum     History of multiple miscarriages     Hashimoto's thyroiditis     Indication for care in labor or delivery     Labor and delivery, indication for care     Rhinoconjunctivitis     Chronic sinusitis, unspecified location         ROS:  CONSTITUTIONAL: no fatigue, no unexpected change in weight, no fever  SKIN: She has noted some changes in her skin with some rash  ENT: She had recurrent upper respiratory infections and sinus infections but was tested for allergy and was found not to have allergies  RESP: no significant cough, no shortness of breath  CV: no chest pain, no palpitations, no new or worsening peripheral edema  GI:  nausea, no vomiting, she has alternating constipation and diarrhea at times she sometimes  will feel a burning sensation in the back of her throat. She is wondering if this is from acid reflux.  : no frequency, no dysuria, no hematuria  MS: She had varicose veins in pregnancy with some myalgias post pregnancy.  ENDOCRINE: She has a history of Hashimoto's thyroiditis  PSYCHIATRIC: NEGATIVE for changes in mood or affect       Physical Exam:  Vitals: /68  Pulse 80  Wt 64.3 kg (141 lb 12.8 oz)  BMI 23.54 kg/m2  BMI= Body mass index is 23.54 kg/(m^2).    GENERAL APPEARANCE: healthy, alert and no distress  EYES: Eyes grossly normal to inspection, PERRL and conjunctivae and sclerae normal  HENT: mouth without ulcers or lesions, oropharynx clear and oral mucous membranes moist  NECK: no adenopathy, no asymmetry, masses, or scars and thyroid normal to palpation  RESP: lungs clear to auscultation - no rales, rhonchi or wheezes  CV: regular rates and rhythm, normal S1 S2, no S3 or S4, no murmur, click or rub, no peripheral edema and peripheral pulses strong  ABDOMEN: soft, mild tenderness in the right upper quadrant no hepatosplenomegaly, no masses and bowel sounds normal  MS: no musculoskeletal defects are noted and gait is age appropriate without ataxia  SKIN: She has a few areas of excoriation on her abdomen.  NEURO: Normal strength and tone, sensory exam grossly normal, mentation intact and speech normal  PSYCH: mentation appears normal and affect normal/bright    Karen was seen today for gastrophageal reflux, abdominal pain and constipation. She is postpartum baby born in March and is still breast-feeding but has significant discomfort and previous endoscopy that showed esophagitis therefore we will start her on omeprazole 20 mg in the morning after breast-feeding. She will return in 2 weeks and will review if this is helping. She also recently has been on antibiotics so we'll check her stool for C. difficile and I discussed that many women will have cholelithiasis after pregnancy. She has a family  history of many of the females having cholelithiasis therefore we will check abdominal ultrasound. She also may be having some symptoms of gluten sensitivity therefore will do a trial off gluten-containing products and review her symptoms when she returns in 2 weeks. If there is blood in her stool she will need an upper GI and colonoscopy which she wanted to hold off on at this time.    Diagnoses and all orders for this visit:    Abdominal pain, right upper quadrant  -     omeprazole (PRILOSEC) 20 MG CR capsule; Take 1 capsule (20 mg) by mouth daily In morning  -     US Abdomen complete; Future  -     Comprehensive metabolic panel; Future  -     CBC with platelets differential; Future  -     TSH with free T4 reflex; Future  -     Tissue transglutaminase preet IgA and IgG; Future  -     Fecal cancer screen FIT; Future    Gastroesophageal reflux disease with esophagitis  -     omeprazole (PRILOSEC) 20 MG CR capsule; Take 1 capsule (20 mg) by mouth daily In morning  -     US Abdomen complete; Future  -     Comprehensive metabolic panel; Future  -     CBC with platelets differential; Future  -     TSH with free T4 reflex; Future  -     Tissue transglutaminase preet IgA and IgG; Future  -     Fecal cancer screen FIT; Future    Loose stools  -     Clostridium difficile toxin B PCR; Future          She will f/u in 2 weeks.  Options for treatment and follow-up care were reviewed with the patient. Karen Beverly was engaged and actively involved in the decision making process and verbalized understanding of the options discussed and was satisfied with the final plan.    Lars Burt MD

## 2017-11-30 NOTE — NURSING NOTE
Chief Complaint   Patient presents with     Gastrophageal Reflux     pt states having acid reflux,     Abdominal Pain     pt states having stomach pain, cramping,     Constipation     pt states having constipation, cramping and having to go to the bathroom     Ana Paula Pardo CMA at 6:22 PM on 11/29/2017.

## 2017-12-04 ENCOUNTER — TELEPHONE (OUTPATIENT)
Dept: LAB | Facility: CLINIC | Age: 37
End: 2017-12-04

## 2017-12-04 DIAGNOSIS — K21.00 GASTROESOPHAGEAL REFLUX DISEASE WITH ESOPHAGITIS: ICD-10-CM

## 2017-12-04 DIAGNOSIS — R10.11 ABDOMINAL PAIN, RIGHT UPPER QUADRANT: ICD-10-CM

## 2017-12-04 DIAGNOSIS — R19.5 LOOSE STOOLS: ICD-10-CM

## 2017-12-04 LAB
ALBUMIN SERPL-MCNC: 4.1 G/DL (ref 3.4–5)
ALP SERPL-CCNC: 51 U/L (ref 40–150)
ALT SERPL W P-5'-P-CCNC: 18 U/L (ref 0–50)
ANION GAP SERPL CALCULATED.3IONS-SCNC: 11 MMOL/L (ref 3–14)
AST SERPL W P-5'-P-CCNC: 13 U/L (ref 0–45)
BASOPHILS # BLD AUTO: 0 10E9/L (ref 0–0.2)
BASOPHILS NFR BLD AUTO: 0.3 %
BILIRUB SERPL-MCNC: 0.4 MG/DL (ref 0.2–1.3)
BUN SERPL-MCNC: 13 MG/DL (ref 7–30)
C DIFF TOX B STL QL: ABNORMAL
CALCIUM SERPL-MCNC: 9.1 MG/DL (ref 8.5–10.1)
CHLORIDE SERPL-SCNC: 106 MMOL/L (ref 94–109)
CO2 SERPL-SCNC: 23 MMOL/L (ref 20–32)
CREAT SERPL-MCNC: 0.55 MG/DL (ref 0.52–1.04)
DIFFERENTIAL METHOD BLD: NORMAL
EOSINOPHIL # BLD AUTO: 0.2 10E9/L (ref 0–0.7)
EOSINOPHIL NFR BLD AUTO: 3.4 %
ERYTHROCYTE [DISTWIDTH] IN BLOOD BY AUTOMATED COUNT: 12.5 % (ref 10–15)
GFR SERPL CREATININE-BSD FRML MDRD: >90 ML/MIN/1.7M2
GLUCOSE SERPL-MCNC: 94 MG/DL (ref 70–99)
HCT VFR BLD AUTO: 36.2 % (ref 35–47)
HEMOCCULT STL QL IA: NEGATIVE
HGB BLD-MCNC: 11.9 G/DL (ref 11.7–15.7)
LYMPHOCYTES # BLD AUTO: 1.7 10E9/L (ref 0.8–5.3)
LYMPHOCYTES NFR BLD AUTO: 29.2 %
MCH RBC QN AUTO: 31 PG (ref 26.5–33)
MCHC RBC AUTO-ENTMCNC: 32.9 G/DL (ref 31.5–36.5)
MCV RBC AUTO: 94 FL (ref 78–100)
MONOCYTES # BLD AUTO: 0.4 10E9/L (ref 0–1.3)
MONOCYTES NFR BLD AUTO: 7.2 %
NEUTROPHILS # BLD AUTO: 3.5 10E9/L (ref 1.6–8.3)
NEUTROPHILS NFR BLD AUTO: 59.9 %
PLATELET # BLD AUTO: 273 10E9/L (ref 150–450)
POTASSIUM SERPL-SCNC: 3.6 MMOL/L (ref 3.4–5.3)
PROT SERPL-MCNC: 7.6 G/DL (ref 6.8–8.8)
RBC # BLD AUTO: 3.84 10E12/L (ref 3.8–5.2)
SODIUM SERPL-SCNC: 140 MMOL/L (ref 133–144)
SPECIMEN SOURCE: ABNORMAL
TSH SERPL DL<=0.005 MIU/L-ACNC: 2.38 MU/L (ref 0.4–4)
WBC # BLD AUTO: 5.8 10E9/L (ref 4–11)

## 2017-12-04 PROCEDURE — 82274 ASSAY TEST FOR BLOOD FECAL: CPT | Performed by: FAMILY MEDICINE

## 2017-12-04 PROCEDURE — 36415 COLL VENOUS BLD VENIPUNCTURE: CPT | Performed by: FAMILY MEDICINE

## 2017-12-04 PROCEDURE — 83516 IMMUNOASSAY NONANTIBODY: CPT | Mod: 59 | Performed by: FAMILY MEDICINE

## 2017-12-04 PROCEDURE — 83516 IMMUNOASSAY NONANTIBODY: CPT | Performed by: FAMILY MEDICINE

## 2017-12-04 PROCEDURE — 80050 GENERAL HEALTH PANEL: CPT | Performed by: FAMILY MEDICINE

## 2017-12-04 NOTE — TELEPHONE ENCOUNTER
Patient dropped off a sample for C.diff test. The stool sample was formed, and cancelled per lab protocal. Please follow up with patient if recollection is needed.  Thank you,   Alexia WHEELER ([facility])

## 2017-12-05 LAB
TTG IGA SER-ACNC: <1 U/ML
TTG IGG SER-ACNC: <1 U/ML

## 2017-12-13 ENCOUNTER — OFFICE VISIT (OUTPATIENT)
Dept: FAMILY MEDICINE | Facility: CLINIC | Age: 37
End: 2017-12-13
Payer: COMMERCIAL

## 2017-12-13 VITALS
WEIGHT: 138.7 LBS | DIASTOLIC BLOOD PRESSURE: 59 MMHG | HEART RATE: 74 BPM | SYSTOLIC BLOOD PRESSURE: 109 MMHG | BODY MASS INDEX: 23.02 KG/M2 | OXYGEN SATURATION: 100 %

## 2017-12-13 DIAGNOSIS — D18.03 HEMANGIOMA OF LIVER: Primary | ICD-10-CM

## 2017-12-13 DIAGNOSIS — K29.00 OTHER ACUTE GASTRITIS WITHOUT HEMORRHAGE: ICD-10-CM

## 2017-12-13 DIAGNOSIS — K21.9 GASTROESOPHAGEAL REFLUX DISEASE WITHOUT ESOPHAGITIS: ICD-10-CM

## 2017-12-13 ASSESSMENT — PAIN SCALES - GENERAL: PAINLEVEL: NO PAIN (0)

## 2017-12-13 NOTE — PATIENT INSTRUCTIONS
[facility]: [phone]     [facility] Medication Refill Request Information:  * Please contact your pharmacy regarding ANY request for medication refills.  ** [facility] Prescription Fax = [phone]  * Please allow 3 business days for routine medication refills.  * Please allow 5 business days for controlled substance medication refills.     [facility] Test Result notification information:  *You will be notified with in 7-10 days of your appointment day regarding the results of your test.  If you are on MyChart you will be notified as soon as the provider has reviewed the results and signed off on them.    Diagnoses and all orders for this visit:    Hemangioma of liver  -     US Abdomen complete; Future    Other acute gastritis without hemorrhage       Continue the omeprazole for 2 month total then may stop. Contact me prior to 3 month follow-up if problems. We will check another ultrasound to make sure the hemangioma has not increased in size.

## 2017-12-13 NOTE — PROGRESS NOTES
HPI:  Karen Beverly is a 37 year old female who presents today for follow-up abdominal pain we placed her on omeprazole and she is feeling better on omeprazole. We also scheduled an abdominal ultrasound and she would like to discuss the test results. She indicates her bowel movements are normal and the pain on the left side has improved. She was having occasional pain on the right side and that is less at this time. Her ultrasound showed an area of possible hemangioma in the liver and a slightly dilated common  Bile duct but as she is not having significant right upper quadrant pain at this time we discussed no need to order a functional gallbladder scan at this time. Her FIT test was negative.  She had to wait for the appointment today as schedulers  did not check her in and we apologized for her wait.    Baby boy Schuyler born March [birth year] vaginal delivery is with her today  No alcohol and is breast feeding.  She has a history of Hashimoto's thyroiditis and  is on the correct dose of thyroid medication.    Patient Active Problem List   Diagnosis     Pregnancy related condition     Elevated TSH     Thyroid disease, antepartum     History of multiple miscarriages     Hashimoto's thyroiditis     Indication for care in labor or delivery     Labor and delivery, indication for care     Rhinoconjunctivitis     Chronic sinusitis, unspecified location     Family History   Problem Relation Age of Onset     Hypertension Mother      CANCER Other      liver cancer     Liver Cancer Maternal Grandmother 79     Hypertension Maternal Grandfather      HEART DISEASE Paternal Grandmother 62     HEART DISEASE Paternal Grandfather 60       ROS:  CONSTITUTIONAL: no fatigue, no unexpected change in weight, no fever  RESP: no significant cough, no shortness of breath  CV: no chest pain, no palpitations  GI: Gastrointestinal symptoms improved. No pain today. we suspect her symptoms were from gastroesophageal reflux or mild gastritis     ENDOCRINE: She has a history of Hashimoto's thyroiditis  PSYCHIATRIC: NEGATIVE for changes in mood or affect       Physical Exam:  Vitals: /59  Pulse 74  Wt 62.9 kg (138 lb 11.2 oz)  SpO2 100%  Breastfeeding? Yes  BMI 23.02 kg/m2  BMI= Body mass index is 23.02 kg/(m^2).    GENERAL APPEARANCE: healthy, alert and no distress  EYES: Eyes grossly normal to inspection, PERRL and conjunctivae and sclerae normal  RESP: lungs clear to auscultation - no rales, rhonchi or wheezes  CV: regular rates and rhythm, normal S1 S2, no S3 or S4, no murmur, click or rub, no peripheral edema and peripheral pulses strong  ABDOMEN: soft,  no tenderness in the right upper quadrant no hepatosplenomegaly, no masses and bowel sounds normal  MS: no musculoskeletal defects are noted  SKIN: No pallor or jaundice   NEURO: Normal strength and tone, sensory exam grossly normal, mentation intact and speech normal  PSYCH: mentation appears normal and affect normal/bright  Test results reviewed today she was provided a copy.   Karen was seen today for gastrophageal reflux, mild gastritis improved on Prilosec 20 mg once daily. She will continue this for 2 month period of time and then discontinue. She'll contact me if she has recurrence of abdominal discomfort.   Ultrasound showed possible hemangioma. She has a family history of grandmother with liver cancer therefore we will do a follow-up ultrasound in 3 months also to check the common bile duct. She is not having symptoms at this time related to gallbladder disorder but will contact me if she notes pain in the right upper quadrant. She will follow-up in 3 months.        Diagnoses and all orders for this visit:    Hemangioma of liver  -     US Abdomen complete; Future    Other acute gastritis without hemorrhage    Gastroesophageal reflux disease without esophagitis          Other acute gastritis without hemorrhage  Continue omeprazole 20 mg daily for 2 month period of time     Options  for treatment and follow-up care were reviewed with the patient. Karen Beverly was engaged and actively involved in the decision making process and verbalized understanding of the options discussed and was satisfied with the final plan.    Lars Burt MD

## 2017-12-13 NOTE — PROGRESS NOTES
Rooming Note    Health Maintenance  Health Maintenance Due   Topic Date Due     INFLUENZA VACCINE (SYSTEM ASSIGNED)  09/01/2017   The following immunizations were discussed, but NOT ordered:   - Influenza (flu shot)  The orders were not placed because: patient declined    Clara Cohen LPN at 4:06 PM on 12/13/2017.

## 2017-12-13 NOTE — MR AVS SNAPSHOT
After Visit Summary   12/13/2017    Karen Beverly    MRN: [id number]           Patient Information     Date Of Birth          1980        Visit Information        Provider Department      12/13/2017 3:00 PM Lars Burt MD [facility]        Today's Diagnoses     Hemangioma of liver    -  1    Other acute gastritis without hemorrhage          Care Instructions    Primary Care Center: [phone]     Primary Care Center Medication Refill Request Information:  * Please contact your pharmacy regarding ANY request for medication refills.  ** [facility] Prescription Fax = [phone]  * Please allow 3 business days for routine medication refills.  * Please allow 5 business days for controlled substance medication refills.     Primary Care Center Test Result notification information:  *You will be notified with in 7-10 days of your appointment day regarding the results of your test.  If you are on MyChart you will be notified as soon as the provider has reviewed the results and signed off on them.    Diagnoses and all orders for this visit:    Hemangioma of liver  -     US Abdomen complete; Future    Other acute gastritis without hemorrhage       Continue the omeprazole for 2 month total then may stop. Contact me prior to 3 month follow-up if problems. We will check another ultrasound to make sure the hemangioma has not increased in size.          Follow-ups after your visit        Follow-up notes from your care team     Discussed this visit Return in about 3 months (around 3/13/2018).      Your next 10 appointments already scheduled     Mar 19, 2018 10:45 AM CDT   US ABDOMEN COMPLETE with UCUS1   [facility] ([facility])    [address] [address] [phone]           Please bring a list of your medicines (including vitamins, minerals and over-the-counter drugs). Also, tell your doctor about any  allergies you may have. Wear comfortable clothes and leave your valuables at home.  Adults: No eating or drinking for 8 hours before the exam. You may take medicine with a small sip of water.  Children: - Children 6+ years: No food or drink for 6 hours before exam. - Children 1-5 years: No food or drink for 4 hours before exam. - Infants, breast-fed: may have breast milk up to 2 hours before exam. - Infants, formula: may have bottle until 4 hours before exam.  Please call the Imaging Department at your exam site with any questions.            Mar 19, 2018 12:00 PM CDT   (Arrive by 11:45 AM)   Return Visit with Lars Burt MD   [facility] ([facility])    [address] [address] [phone]              Future tests that were ordered for you today     Open Future Orders        Priority Expected Expires Ordered    US Abdomen complete Routine 3/1/2018 12/13/2018 12/13/2017            Who to contact     Please call your clinic at [phone] to:    Ask questions about your health    Make or cancel appointments    Discuss your medicines    Learn about your test results    Speak to your doctor   If you have compliments or concerns about an experience at your clinic, or if you wish to file a complaint, please contact [facility] Patient Relations at [phone] or email us at [email or web address]         Additional Information About Your Visit        Grohart Information     [employer] gives you secure access to your electronic health record. If you see a primary care provider, you can also send messages to your care team and make appointments. If you have questions, please call your primary care clinic.  If you do not have a primary care provider, please call [phone] and they will assist you.      Carebase is an electronic gateway that provides easy, online access to your medical records. With  Velia, you can request a clinic appointment, read your test results, renew a prescription or communicate with your care team.     To access your existing account, please contact your [facility] or call [phone] for assistance.        Care EveryWhere ID     This is your Care EveryWhere ID. This could be used by other organizations to access your [place] medical records  [id number]        Your Vitals Were     Pulse Pulse Oximetry Breastfeeding? BMI (Body Mass Index)          74 100% Yes 23.02 kg/m2         Blood Pressure from Last 3 Encounters:   12/13/17 109/59   11/29/17 105/68   10/02/17 100/62    Weight from Last 3 Encounters:   12/13/17 62.9 kg (138 lb 11.2 oz)   11/29/17 64.3 kg (141 lb 12.8 oz)   10/02/17 64 kg (141 lb)               Primary Care Provider Office Phone # Fax #    Lars Burt [phone]       [address] [facility] [address]        Equal Access to Services     DEVANTE HARDING : Hadii aad ku hadasho Soomaali, waaxda luqadaha, qaybta kaalmada adeegyada, waxay idiin haypablon juwan moreno . So [facility] [phone].    ATENCIÓN: Si habla español, tiene a christiansen disposición servicios gratuitos de asistencia lingüística. Llame al [phone].    We comply with applicable federal civil rights laws and [place] laws. We do not discriminate on the basis of race, color, national origin, age, disability, sex, sexual orientation, or gender identity.            Thank you!     Thank you for choosing [facility]  for your care. Our goal is always to provide you with excellent care. Hearing back from our patients is one way we can continue to improve our services. Please take a few minutes to complete the written survey that you may receive in the mail after your visit with us. Thank you!             Your Updated Medication List - Protect others around you: Learn how to safely use, store and throw away your medicines at  www.disposemymeds.org.          This list is accurate as of: 12/13/17  4:50 PM.  Always use your most recent med list.                   Brand Name Dispense Instructions for use Diagnosis    levothyroxine 75 MCG tablet    SYNTHROID/LEVOTHROID    90 tablet    Take 1 tablet (75 mcg) by mouth daily    Hashimoto's thyroiditis       omeprazole 20 MG CR capsule    priLOSEC    30 capsule    Take 1 capsule (20 mg) by mouth daily In morning    Abdominal pain, right upper quadrant, Gastroesophageal reflux disease with esophagitis       PRENATAL VITAMINS PO      Take 1 tablet by mouth daily.

## 2017-12-13 NOTE — NURSING NOTE
Chief Complaint   Patient presents with     Results     Patient is here to follow up on results.      Clara Cohen LPN at 3:56 PM on 12/13/2017.

## 2018-03-19 ENCOUNTER — RADIANT APPOINTMENT (OUTPATIENT)
Dept: ULTRASOUND IMAGING | Facility: CLINIC | Age: 38
End: 2018-03-19
Attending: FAMILY MEDICINE
Payer: COMMERCIAL

## 2018-03-19 ENCOUNTER — OFFICE VISIT (OUTPATIENT)
Dept: FAMILY MEDICINE | Facility: CLINIC | Age: 38
End: 2018-03-19
Payer: COMMERCIAL

## 2018-03-19 VITALS
DIASTOLIC BLOOD PRESSURE: 61 MMHG | BODY MASS INDEX: 22.59 KG/M2 | WEIGHT: 136.1 LBS | SYSTOLIC BLOOD PRESSURE: 99 MMHG | HEART RATE: 79 BPM | RESPIRATION RATE: 16 BRPM

## 2018-03-19 DIAGNOSIS — D18.03 HEMANGIOMA OF LIVER: ICD-10-CM

## 2018-03-19 DIAGNOSIS — R10.11 ABDOMINAL PAIN, RIGHT UPPER QUADRANT: Primary | ICD-10-CM

## 2018-03-19 ASSESSMENT — PAIN SCALES - GENERAL: PAINLEVEL: NO PAIN (0)

## 2018-03-19 NOTE — PROGRESS NOTES
[facility]  Primary Care Center   Lars Burt MD  03/19/2018      Chief Complaint:   Results from Ultrasound Scan       History of Present Illness:   Karen Beverly is a 37 year old female with a history thyroid abnormalities,   who presents for results of ultrasound following abdominal pain. I saw her on 12/13/17 for right upper abdominal pain ultrasound showed an area of possible hemangioma in the liver and a slightly dilated common Bile duct. Another follow up ultrasound was ordered for 3 months.Today, she comes in for results of ultrasound and notes continued intermittent pain in the right upper abdomen worse with fatty food such as olive oil. She tries to eat a healthy diet and does not always know what triggers the pain. The results of the ultrasound revealed that her gallbladder duct was stable to slightly dilated at 9 mm. She had no signs of cholelithiasis and her hemangioma was stable and did not enlarge  She rates her current pain as about a 5/10 on the pain scale. She stated that it can hurt to the touch and she occasionally tries to massage the pain away, but with little relief. She also said it was more swollen in the evening. Sometimes after eating foods with olive oil it causes her greater discomfort. It was also noted that she had a baby boy last year (3/14/17) and he is doing well, still breast feeding. She does not feel she could be pregnant.    Other concerns discussed:  1. Lab work - previous lab work was normal including liver function  2. Gastrophageal reflux is resolved and she discontinued Prilosec 20 mg      Review of Systems:   Pertinent items are noted in HPI, remainder of complete ROS is negative.      Active Medications:      levothyroxine (SYNTHROID/LEVOTHROID) 75 MCG tablet, Take 1 tablet (75 mcg) by mouth daily, Disp: 90 tablet, Rfl: 3     PRENATAL VITAMINS PO, Take 1 tablet by mouth daily.  , Disp: , Rfl:       Allergies:   Augmentin and Latex      Past Medical  History:  Pregnancy related condition  Elevated TSH  Eczema  Endometriosis   Thyroid Disease  Hashimoto's Thyroiditis  Rhinoconjunctivitis   Chronic Sinusitis  Gastroesophageal reflux disease without esophagitis     Past Surgical History:  Davinci Pelvic Procedure (2012)    Family History:   Hypertension - mother, maternal grandfather  Liver cancer - maternal grandmother  Heart disease - paternal grandmother, paternal grandfather     Social History:   Tobacco Use: none  Alcohol Use: none  PCP: Lars Burt        Physical Exam:   BP 99/61  Pulse 79  Resp 16  Wt 61.7 kg (136 lb 1.6 oz)  Breastfeeding? No  BMI 22.59 kg/m2   Physical Examination:  General:  Pleasant, alert, no acute distress  Lungs:  Clear to auscultation throughout, no wheezes, rhonchi or rales.  C/V:  Regular rate and rhythm, no murmurs, rubs or gallops.  No JVD, no carotid bruits.  No peripheral edema.    Abdomen:  Not distended.  Bowel sounds active.  No tenderness, no hepatosplenomegaly or masses.  No CVA tenderness or masses. Negative barajas's sign, liver and spleen normal.   Lymph:  No cervical, axillary or inguinal lymph nodes.  Neuro:   Face symmetric. No tremor.  Gait steady.   M/S:  No joint deformities noted.  No joint swelling.  Skin:  No suspicious lesions.  No rashes or jaundice.  Normal moisture, good skin turgor.   Psych:  Broad range affect.  Not psychomotor slowed.  No signs of anxiety.   US  Impression:   1.  Again noted 0.6 x 0.8 x 0.6 cm echogenic focus in the right  hepatic lobe, likely presenting hemangioma, not significantly changed  as compared to prior exam.  2.  The common bile duct is mildly dilated measuring 0.9 cm,  previously measured 0.8 cm on 12/5/2017 exam.     Assessment and Plan:  Karen Beverly present for follow-up of intermittent right upper abdominal pain, seems worse after olive oil containing foods. The hemagioma is stable, bile duct slightly enlarged but no cholelithiasis. She is one year post  partum. I discussed options and will order a HIDA GB function scan, pregnancy test prior with GI follow-up for recommendations.  Abdominal pain, right upper quadrant  - GASTROENTEROLOGY ADULT REFERRAL  - NM Hepatobiliary Scan w GB EF  - HCG qualitative urine    Hemangioma of liver  Stable per verbal radiology read, will review US report  - GASTROENTEROLOGY ADULT REFERRAL  - NM Hepatobiliary Scan w GB EF        Follow-up: Return in about 11 months (around 2/19/2019) for Physical Exam.       All questions were addressed and voiced understanding and agreement with the above.    Scribe Disclosure:   I, Ness Jerez, am serving as a scribe to document services personally performed by Lars Burt MD at this visit, based upon the provider's statements to me. All documentation has been reviewed by the aforementioned provider prior to being entered into the official medical record.     Portions of this medical record were completed by a scribe. UPON MY REVIEW AND AUTHENTICATION BY ELECTRONIC SIGNATURE, this confirms (a) I performed the applicable clinical services, and (b) the record is accurate.   Lars Burt MD

## 2018-03-19 NOTE — NURSING NOTE
Chief Complaint   Patient presents with     Results     pt is here to follow up on ultrasound results        Viridiana Armenta CMA at 11:46 AM on 3/19/2018

## 2018-03-19 NOTE — PATIENT INSTRUCTIONS
Primary Care Center Phone Number [phone]  Primary Care Center Medication Refill Request Information:  * Please contact your pharmacy regarding ANY request for medication refills.  ** [facility] Prescription Fax = [phone]  * Please allow 3 business days for routine medication refills.  * Please allow 5 business days for controlled substance medication refills.     Primary Care Center Test Result notification information:  *You will be notified with in 7-10 days of your appointment day regarding the results of your test.  If you are on MyChart you will be notified as soon as the provider has reviewed the results and signed off on them.

## 2018-03-19 NOTE — MR AVS SNAPSHOT
After Visit Summary   3/19/2018    Karen Beverly    MRN: [id number]           Patient Information     Date Of Birth          1980        Visit Information        Provider Department      3/19/2018 12:00 PM Lars Burt MD [facility]        Today's Diagnoses     Abdominal pain, right upper quadrant    -  1    Hemangioma of liver          Care Instructions    Primary Care Center Phone Number [phone]  Primary Care Center Medication Refill Request Information:  * Please contact your pharmacy regarding ANY request for medication refills.  ** [facility] Prescription Fax = [phone]  * Please allow 3 business days for routine medication refills.  * Please allow 5 business days for controlled substance medication refills.     Primary Care Center Test Result notification information:  *You will be notified with in 7-10 days of your appointment day regarding the results of your test.  If you are on MyChart you will be notified as soon as the provider has reviewed the results and signed off on them.                  Follow-ups after your visit        Additional Services     GASTROENTEROLOGY ADULT REFERRAL       Preferred Location: [facility] [phone]      Please be aware that coverage of these services is subject to the terms and limitations of your health insurance plan.  Call member services at your health plan with any benefit or coverage questions.  Any procedures must be performed at a [place] facility OR coordinated by your clinic's referral office.    Please bring the following with you to your appointment:    (1) Any X-Rays, CTs or MRIs which have been performed.  Contact the facility where they were done to arrange for  prior to your scheduled appointment.    (2) List of current medications   (3) This referral request   (4) Any documents/labs given to you for this referral                  Follow-up notes from your care team     Write patient with  results Return in about 11 months (around 2/19/2019) for Physical Exam.      Your next 10 appointments already scheduled     Apr 02, 2018  2:00 PM CDT   Lab with  LAB   [facility] ([facility])    [address] [facility]  [address]st [facility] [id number] [phone]            Apr 02, 2018  3:00 PM CDT   (Arrive by 2:45 PM)   New General Liver with Sandra Sutton MD   [facility] Hepatology ([facility])    [address] [place] [address]  [address]  [facility] [id number] [phone]            Apr 09, 2018  1:00 PM CDT   (Arrive by 12:45 PM)   NM HEPATOBILIARY SCAN W GB EF with [address], [place], Nuclear Medicine ([facility])    [address] [facility] [address] [phone]           Please bring a list of your medicines to the exam. (Include vitamins, minerals and over-the-counter drugs.) You should wear comfortable clothes. Leave your valuables at home. Please bring related prior results and films.  Tell your doctor:   If you are breastfeeding or may be pregnant.   If you have had a test including barium within the past 48 hours. Barium may change the results of certain exams.   If you think you may need sedation (medicine to help you relax).  4 hours before your exam: stop drinking and eating. Stop all morphine or morphine derivatives.  Please call your Imaging Department at your exam site with any questions.            Jul 02, 2018 10:00 AM CDT   (Arrive by 9:45 AM)   MyChart Endocrine Return with Jayne Zapata MD   [facility]in[facility] ([facility])    [address] [place] [address]  3rd [facility] [id number] [phone]            Feb 18, 2019 10:30 AM CST   (Arrive by 10:15 AM)   PHYSICAL with Lars Burt MD   [facility] ([facility])    [address] [place] [address]  4th [facility]  [address]   [phone]              Future tests that were ordered for you today     Open Future Orders        Priority Expected Expires Ordered    NM Hepatobiliary Scan w GB EF Routine  3/19/2019 3/19/2018    HCG qualitative urine Routine 3/19/2018 3/20/2019 3/19/2018            Who to contact     Please call your clinic at [phone] to:    Ask questions about your health    Make or cancel appointments    Discuss your medicines    Learn about your test results    Speak to your doctor            Additional Information About Your Visit        Remitly Information     Remitly gives you secure access to your electronic health record. If you see a primary care provider, you can also send messages to your care team and make appointments. If you have questions, please call your primary care clinic.  If you do not have a primary care provider, please call [phone] and they will assist you.      Remitly is an electronic gateway that provides easy, online access to your medical records. With [employer]itly, you can request a clinic appointment, read your test results, renew a prescription or communicate with your care team.     To access your existing account, please contact your [facility] or call [phone] for assistance.        Care EveryWhere ID     This is your Care EveryWhere ID. This could be used by other organizations to access your [place] medical records  [id number]        Your Vitals Were     Pulse Respirations Breastfeeding? BMI (Body Mass Index)          79 16 No 22.59 kg/m2         Blood Pressure from Last 3 Encounters:   03/19/18 99/61   12/13/17 109/59   11/29/17 105/68    Weight from Last 3 Encounters:   03/19/18 61.7 kg (136 lb 1.6 oz)   12/13/17 62.9 kg (138 lb 11.2 oz)   11/29/17 64.3 kg (141 lb 12.8 oz)              We Performed the Following     GASTROENTEROLOGY ADULT REFERRAL          Today's Medication Changes          These changes are accurate as of 3/19/18  12:50 PM.  If you have any questions, ask your nurse or doctor.               Stop taking these medicines if you haven't already. Please contact your care team if you have questions.     omeprazole 20 MG CR capsule   Commonly known as:  priLOSEC   Stopped by:  Lars Burt MD                    Primary Care Provider Office Phone # Fax #    Lars Burt [phone] [address] [facility] [address]        Equal Access to Services     [facility]: Hadii aad ku hadasho Soomaali, waaxda luqadaha, qaybta kaalmada adeegyada, waxay idiin hayaan adeeg susan moreno . So [facility] [phone].    ATENCIÓN: Si habla español, tiene a christiansen disposición servicios gratuitos de asistencia lingüística. [facility] [phone].    We comply with applicable federal civil rights laws and [place] laws. We do not discriminate on the basis of race, color, national origin, age, disability, sex, sexual orientation, or gender identity.            Thank you!     Thank you for choosing [facility]  for your care. Our goal is always to provide you with excellent care. Hearing back from our patients is one way we can continue to improve our services. Please take a few minutes to complete the written survey that you may receive in the mail after your visit with us. Thank you!             Your Updated Medication List - Protect others around you: Learn how to safely use, store and throw away your medicines at www.disposemymeds.org.          This list is accurate as of 3/19/18 12:50 PM.  Always use your most recent med list.                   Brand Name Dispense Instructions for use Diagnosis    levothyroxine 75 MCG tablet    SYNTHROID/LEVOTHROID    90 tablet    Take 1 tablet (75 mcg) by mouth daily    Hashimoto's thyroiditis       PRENATAL VITAMINS PO      Take 1 tablet by mouth daily.

## 2018-03-30 DIAGNOSIS — R94.5 ABNORMAL RESULTS OF LIVER FUNCTION STUDIES: Primary | ICD-10-CM

## 2018-04-02 ENCOUNTER — OFFICE VISIT (OUTPATIENT)
Dept: GASTROENTEROLOGY | Facility: CLINIC | Age: 38
End: 2018-04-02
Attending: FAMILY MEDICINE
Payer: COMMERCIAL

## 2018-04-02 VITALS
DIASTOLIC BLOOD PRESSURE: 63 MMHG | WEIGHT: 138.2 LBS | OXYGEN SATURATION: 100 % | BODY MASS INDEX: 23.6 KG/M2 | TEMPERATURE: 98.1 F | HEIGHT: 64 IN | HEART RATE: 80 BPM | SYSTOLIC BLOOD PRESSURE: 100 MMHG

## 2018-04-02 DIAGNOSIS — R10.11 ABDOMINAL PAIN, RIGHT UPPER QUADRANT: ICD-10-CM

## 2018-04-02 DIAGNOSIS — D18.03 HEMANGIOMA OF LIVER: ICD-10-CM

## 2018-04-02 DIAGNOSIS — R94.5 ABNORMAL RESULTS OF LIVER FUNCTION STUDIES: ICD-10-CM

## 2018-04-02 LAB
ALBUMIN SERPL-MCNC: 4 G/DL (ref 3.4–5)
ALP SERPL-CCNC: 52 U/L (ref 40–150)
ALT SERPL W P-5'-P-CCNC: 19 U/L (ref 0–50)
ANION GAP SERPL CALCULATED.3IONS-SCNC: 5 MMOL/L (ref 3–14)
AST SERPL W P-5'-P-CCNC: 13 U/L (ref 0–45)
BILIRUB DIRECT SERPL-MCNC: <0.1 MG/DL (ref 0–0.2)
BILIRUB SERPL-MCNC: 0.2 MG/DL (ref 0.2–1.3)
BUN SERPL-MCNC: 12 MG/DL (ref 7–30)
CALCIUM SERPL-MCNC: 8.8 MG/DL (ref 8.5–10.1)
CHLORIDE SERPL-SCNC: 108 MMOL/L (ref 94–109)
CO2 SERPL-SCNC: 26 MMOL/L (ref 20–32)
CREAT SERPL-MCNC: 0.55 MG/DL (ref 0.52–1.04)
ERYTHROCYTE [DISTWIDTH] IN BLOOD BY AUTOMATED COUNT: 12.3 % (ref 10–15)
GFR SERPL CREATININE-BSD FRML MDRD: >90 ML/MIN/1.7M2
GLUCOSE SERPL-MCNC: 112 MG/DL (ref 70–99)
HCT VFR BLD AUTO: 35.9 % (ref 35–47)
HGB BLD-MCNC: 12 G/DL (ref 11.7–15.7)
INR PPP: 1.06 (ref 0.86–1.14)
MCH RBC QN AUTO: 30.9 PG (ref 26.5–33)
MCHC RBC AUTO-ENTMCNC: 33.4 G/DL (ref 31.5–36.5)
MCV RBC AUTO: 93 FL (ref 78–100)
PLATELET # BLD AUTO: 241 10E9/L (ref 150–450)
POTASSIUM SERPL-SCNC: 3.6 MMOL/L (ref 3.4–5.3)
PROT SERPL-MCNC: 7.5 G/DL (ref 6.8–8.8)
RBC # BLD AUTO: 3.88 10E12/L (ref 3.8–5.2)
SODIUM SERPL-SCNC: 140 MMOL/L (ref 133–144)
WBC # BLD AUTO: 5.8 10E9/L (ref 4–11)

## 2018-04-02 PROCEDURE — 85027 COMPLETE CBC AUTOMATED: CPT | Performed by: INTERNAL MEDICINE

## 2018-04-02 PROCEDURE — G0463 HOSPITAL OUTPT CLINIC VISIT: HCPCS | Mod: ZF

## 2018-04-02 PROCEDURE — 80048 BASIC METABOLIC PNL TOTAL CA: CPT | Performed by: INTERNAL MEDICINE

## 2018-04-02 PROCEDURE — 85610 PROTHROMBIN TIME: CPT | Performed by: INTERNAL MEDICINE

## 2018-04-02 PROCEDURE — 80076 HEPATIC FUNCTION PANEL: CPT | Performed by: INTERNAL MEDICINE

## 2018-04-02 PROCEDURE — 36415 COLL VENOUS BLD VENIPUNCTURE: CPT | Performed by: INTERNAL MEDICINE

## 2018-04-02 ASSESSMENT — PAIN SCALES - GENERAL: PAINLEVEL: NO PAIN (0)

## 2018-04-02 NOTE — PROGRESS NOTES
"REASON FOR CONSULTATION: \"liver disease and RUQ pain\"  REFERRING PROVIDER: Dr. Lars Burt  A/P  Karen Beverly is a 37 year old female with RUQ pain, worse with fatty foods. CBD slightly enlarged. Mothter and 2 aunts with gall stones.  Agree with recommendation for HIDA scan. She was told she cannot breast feed for 24 hours after that and does not want to do the test until she is done with breast feeding. This is fine from my perspective. If HIDA is negative, she should be seen in GI clinic (not hepatology) for further evaluation, which may include EUS. Ok to take OTC NSAID, acetaminophen for this.    No liver disease identified. Normal liver tests, normal appearance of liver in imaging. Sub cm liver hemangioma: does not require follow up and not related to her symptoms    Follow up would be with CP and/or GI clinic, not hepatology.    Sandra Sutton MD  Hepatology/Liver Transplant  Medical Director, Liver Transplantation  [facility]  Subjective  Karen Beverly is a 37 year old female referred for RUQ pain.  US on 3/19/18 showed a 0.6x0.6x0.8 cm hemangioma. CBD 0.9 cm (was 0.8 cm) Otherwise normal. CT in 2012 was normal (done for abdominal pain and fullness). She has had  intermittent pain in the right upper abdomen worse with fatty food such as olive oil. She tries to eat a healthy diet and does not always know what triggers the pain. It has been occruing off an on for about 2 years. Someimtes last up to 2 hours. She does not take anything for the pain and wonders if she can. Had a pregnancy in the midst of it. She has a healthy 1 year old and continues to breast feed.    Blood tests are all normal. TTG TSH normal  HCV no record  HBV neg 2012    Risk factors for fatty liver disease: none  ETOH use: very rare    Past Medical History:   Diagnosis Date     Eczema      Endometriosis      Thyroid disease        Social History     Social History     Marital status:      Spouse name: N/A " "    Number of children: N/A     Years of education: N/A     Occupational History     Not on file.     Social History Main Topics     Smoking status: Never Smoker     Smokeless tobacco: Never Used     Alcohol use No     Drug use: No     Sexual activity: Yes     Partners: Male      Comment:      Other Topics Concern     Not on file     Social History Narrative     2009 one daughter 7 months Maday. She lived in [place]. [place] accident when she was 6.       Family History   Problem Relation Age of Onset     Hypertension Mother      CANCER Other      liver cancer     Liver Cancer Maternal Grandmother 79     Hypertension Maternal Grandfather      HEART DISEASE Paternal Grandmother 62     HEART DISEASE Paternal Grandfather 60   Mother and 2 aunts have gallstones.     ROS: 10 point ROS neg other than the symptoms noted above in the HPI.    /63  Pulse 80  Temp 98.1  F (36.7  C) (Oral)  Ht 1.626 m (5' 4\")  Wt 62.7 kg (138 lb 3.2 oz)  SpO2 100%  BMI 23.72 kg/m2  BMI= Body mass index is 30.69 kg/(m^2).   Constitutional: alert and no distress.   Neck: Neck supple. No adenopathy. Thyroid symmetric, normal size  HEENT:Normocephalic. No masses, lesions, tenderness or abnormalities. No temporal muscle wasting ENT exam normal, no neck nodes or sinus tenderness. No oral lesions  Cardiovascular: negative, No lifts, heaves, or thrills. RRR. No murmurs, clicks gallops or rub  Respiratory: negative, Good diaphragmatic excursion. Lungs clear. No wheezes or rales  Gastrointestinal: Abdomen soft, non-tender. BS normal.  No masses, organomegaly  Skin: no suspicious lesions or rashes. No spider angiomata or palmar erythema. Nails normal.  Neurologic: Alert and oriented x3. No asterixis or tremor. Gait normal.   Psychiatric:  Appropriate, well groomed.  Hematologic/Lymphatic/Immunologic: Normal cervical and supraclavicular  lymph nodes      "

## 2018-04-02 NOTE — LETTER
"4/2/2018       RE: Karen Beverly  [address]  [place] [address]     Dear Colleague,    Thank you for referring your patient, Karen Beverly, to the [facility] HEPATOLOGY at [facility]. Please see a copy of my visit note below.    REASON FOR CONSULTATION: \"liver disease and RUQ pain\"  REFERRING PROVIDER: Dr. Lars Burt  A/P  Karen Beverly is a 37 year old female with RUQ pain, worse with fatty foods. CBD slightly enlarged. Mothter and 2 aunts with gall stones.  Agree with recommendation for HIDA scan. She was told she cannot breast feed for 24 hours after that and does not want to do the test until she is done with breast feeding. This is fine from my perspective. If HIDA is negative, she should be seen in GI clinic (not hepatology) for further evaluation, which may include EUS. Ok to take OTC NSAID, acetaminophen for this.    No liver disease identified. Normal liver tests, normal appearance of liver in imaging. Sub cm liver hemangioma: does not require follow up and not related to her symptoms    Follow up would be with CP and/or GI clinic, not hepatology.    Sandra Sutton MD  Hepatology/Liver Transplant  Medical Director, Liver Transplantation  [facility]  Subjective  Karen Beverly is a 37 year old female referred for RUQ pain.  US on 3/19/18 showed a 0.6x0.6x0.8 cm hemangioma. CBD 0.9 cm (was 0.8 cm) Otherwise normal. CT in 2012 was normal (done for abdominal pain and fullness). She has had  intermittent pain in the right upper abdomen worse with fatty food such as olive oil. She tries to eat a healthy diet and does not always know what triggers the pain. It has been occruing off an on for about 2 years. Someimtes last up to 2 hours. She does not take anything for the pain and wonders if she can. Had a pregnancy in the midst of it. She has a healthy 1 year old and continues to breast feed.    Blood tests are all normal. TTG TSH normal  HCV no " "record  HBV neg 2012    Risk factors for fatty liver disease: none  ETOH use: very rare    Past Medical History:   Diagnosis Date     Eczema      Endometriosis      Thyroid disease        Social History     Social History     Marital status:      Spouse name: N/A     Number of children: N/A     Years of education: N/A     Occupational History     Not on file.     Social History Main Topics     Smoking status: Never Smoker     Smokeless tobacco: Never Used     Alcohol use No     Drug use: No     Sexual activity: Yes     Partners: Male      Comment:      Other Topics Concern     Not on file     Social History Narrative     2009 one daughter 7 months Maday. She lived in [place]. [place] accident when she was 6.       Family History   Problem Relation Age of Onset     Hypertension Mother      CANCER Other      liver cancer     Liver Cancer Maternal Grandmother 79     Hypertension Maternal Grandfather      HEART DISEASE Paternal Grandmother 62     HEART DISEASE Paternal Grandfather 60   Mother and 2 aunts have gallstones.     ROS: 10 point ROS neg other than the symptoms noted above in the HPI.    /63  Pulse 80  Temp 98.1  F (36.7  C) (Oral)  Ht 1.626 m (5' 4\")  Wt 62.7 kg (138 lb 3.2 oz)  SpO2 100%  BMI 23.72 kg/m2  BMI= Body mass index is 30.69 kg/(m^2).   Constitutional: alert and no distress.   Neck: Neck supple. No adenopathy. Thyroid symmetric, normal size  HEENT:Normocephalic. No masses, lesions, tenderness or abnormalities. No temporal muscle wasting ENT exam normal, no neck nodes or sinus tenderness. No oral lesions  Cardiovascular: negative, No lifts, heaves, or thrills. RRR. No murmurs, clicks gallops or rub  Respiratory: negative, Good diaphragmatic excursion. Lungs clear. No wheezes or rales  Gastrointestinal: Abdomen soft, non-tender. BS normal.  No masses, organomegaly  Skin: no suspicious lesions or rashes. No spider angiomata or palmar erythema. Nails " normal.  Neurologic: Alert and oriented x3. No asterixis or tremor. Gait normal.   Psychiatric:  Appropriate, well groomed.  Hematologic/Lymphatic/Immunologic: Normal cervical and supraclavicular  lymph nodes        Again, thank you for allowing me to participate in the care of your patient.      Sincerely,    Sandra Sutton MD

## 2018-04-02 NOTE — MR AVS SNAPSHOT
After Visit Summary   4/2/2018    Karen Beverly    MRN: [id number]           Patient Information     Date Of Birth          1980        Visit Information        Provider Department      4/2/2018 3:00 PM Sandra Sutton MD [facility] Hepatology        Today's Diagnoses     Abdominal pain, right upper quadrant        Hemangioma of liver           Follow-ups after your visit        Your next 10 appointments already scheduled     Jul 02, 2018 10:00 AM CDT   (Arrive by 9:45 AM)   MyChart Endocrine Return with Jayne Zapata MD   [facility] Endocrinology ([place] [facility])    [address] [place] [address]  [address] [facility] [id number] [phone]            Feb 18, 2019 10:30 AM CST   (Arrive by 10:15 AM)   PHYSICAL with Lars Burt MD   [facility] ([place] [facility])    [address] [place] [address]  [address] [facility] [id number] [phone]              Future tests that were ordered for you today     Open Future Orders        Priority Expected Expires Ordered    HCG qualitative urine Routine  4/2/2019 4/2/2018            Who to contact     If you have questions or need follow up information about today's clinic visit or your schedule please contact [facility] HEPATOLOGY directly at [phone].  Normal or non-critical lab and imaging results will be communicated to you by MyChart, letter or phone within 4 business days after the clinic has received the results. If you do not hear from us within 7 days, please contact the clinic through [employer] or phone. If you have a critical or abnormal lab result, we will notify you by phone as soon as possible.  Submit refill requests through [employer] or call your pharmacy and they will forward the refill request to us. Please allow 3 business days for your refill to be completed.          Additional Information About Your Visit        [employer] Information     [employer] gives you  "secure access to your electronic health record. If you see a primary care provider, you can also send messages to your care team and make appointments. If you have questions, please call your primary care clinic.  If you do not have a primary care provider, please call [phone] and they will assist you.        Care EveryWhere ID     This is your Care EveryWhere ID. This could be used by other organizations to access your [place] medical records  [id number]        Your Vitals Were     Pulse Temperature Height Pulse Oximetry BMI (Body Mass Index)       80 98.1  F (36.7  C) (Oral) 1.626 m (5' 4\") 100% 23.72 kg/m2        Blood Pressure from Last 3 Encounters:   04/02/18 100/63   03/19/18 99/61   12/13/17 109/59    Weight from Last 3 Encounters:   04/02/18 62.7 kg (138 lb 3.2 oz)   03/19/18 61.7 kg (136 lb 1.6 oz)   12/13/17 62.9 kg (138 lb 11.2 oz)              Today, you had the following     No orders found for display       Primary Care Provider Office Phone # Fax #    Lars Burt [phone]       [address] [facility] [address]        Equal Access to Services     DEVANTE HARDING : Hadii jose m ku hadasho Soomaali, waaxda luqadaha, qaybta kaalmada adeegyada, waxay hayden haypablon juwan moreno . So [facility] [phone].    ATENCIÓN: Si habla español, tiene a christiansen disposición servicios gratuitos de asistencia lingüística. Llame al [phone].    We comply with applicable federal civil rights laws and [place] laws. We do not discriminate on the basis of race, color, national origin, age, disability, sex, sexual orientation, or gender identity.            Thank you!     Thank you for choosing [facility] HEPATOLOGY  for your care. Our goal is always to provide you with excellent care. Hearing back from our patients is one way we can continue to improve our services. Please take a few minutes to complete the written survey that you may receive in the mail after your visit with us. Thank " you!             Your Updated Medication List - Protect others around you: Learn how to safely use, store and throw away your medicines at www.disposemymeds.org.          This list is accurate as of 4/2/18  3:19 PM.  Always use your most recent med list.                   Brand Name Dispense Instructions for use Diagnosis    levothyroxine 75 MCG tablet    SYNTHROID/LEVOTHROID    90 tablet    Take 1 tablet (75 mcg) by mouth daily    Hashimoto's thyroiditis       PRENATAL VITAMINS PO      Take 1 tablet by mouth daily.

## 2018-04-02 NOTE — NURSING NOTE
"Chief Complaint   Patient presents with     Consult     liver disease       Initial /63  Pulse 80  Temp 98.1  F (36.7  C) (Oral)  Ht 1.626 m (5' 4\")  Wt 62.7 kg (138 lb 3.2 oz)  SpO2 100%  BMI 23.72 kg/m2 Estimated body mass index is 23.72 kg/(m^2) as calculated from the following:    Height as of this encounter: 1.626 m (5' 4\").    Weight as of this encounter: 62.7 kg (138 lb 3.2 oz).  Medication Reconciliation: complete   Fabi Delcid, CMA      "

## 2018-04-02 NOTE — LETTER
"4/2/2018      RE: Karen Beverly  [address]  [place] [address]       REASON FOR CONSULTATION: \"liver disease and RUQ pain\"  REFERRING PROVIDER: Dr. Lars Burt  A/P  Karen Beverly is a 37 year old female with RUQ pain, worse with fatty foods. CBD slightly enlarged. Mothter and 2 aunts with gall stones.  Agree with recommendation for HIDA scan. She was told she cannot breast feed for 24 hours after that and does not want to do the test until she is done with breast feeding. This is fine from my perspective. If HIDA is negative, she should be seen in GI clinic (not hepatology) for further evaluation, which may include EUS. Ok to take OTC NSAID, acetaminophen for this.    No liver disease identified. Normal liver tests, normal appearance of liver in imaging. Sub cm liver hemangioma: does not require follow up and not related to her symptoms    Follow up would be with CP and/or GI clinic, not hepatology.    Sandra Sutton MD  Hepatology/Liver Transplant  Medical Director, Liver Transplantation  [facility]  Subjective  Karen Beverly is a 37 year old female referred for RUQ pain.  US on 3/19/18 showed a 0.6x0.6x0.8 cm hemangioma. CBD 0.9 cm (was 0.8 cm) Otherwise normal. CT in 2012 was normal (done for abdominal pain and fullness). She has had  intermittent pain in the right upper abdomen worse with fatty food such as olive oil. She tries to eat a healthy diet and does not always know what triggers the pain. It has been occruing off an on for about 2 years. Someimtes last up to 2 hours. She does not take anything for the pain and wonders if she can. Had a pregnancy in the midst of it. She has a healthy 1 year old and continues to breast feed.    Blood tests are all normal. TTG TSH normal  HCV no record  HBV neg 2012    Risk factors for fatty liver disease: none  ETOH use: very rare    Past Medical History:   Diagnosis Date     Eczema      Endometriosis      Thyroid disease        Social " "History     Social History     Marital status:      Spouse name: N/A     Number of children: N/A     Years of education: N/A     Occupational History     Not on file.     Social History Main Topics     Smoking status: Never Smoker     Smokeless tobacco: Never Used     Alcohol use No     Drug use: No     Sexual activity: Yes     Partners: Male      Comment:      Other Topics Concern     Not on file     Social History Narrative     2009 one daughter 7 months Maday. She lived in [place]. [place] accident when she was 6.       Family History   Problem Relation Age of Onset     Hypertension Mother      CANCER Other      liver cancer     Liver Cancer Maternal Grandmother 79     Hypertension Maternal Grandfather      HEART DISEASE Paternal Grandmother 62     HEART DISEASE Paternal Grandfather 60   Mother and 2 aunts have gallstones.     ROS: 10 point ROS neg other than the symptoms noted above in the HPI.    /63  Pulse 80  Temp 98.1  F (36.7  C) (Oral)  Ht 1.626 m (5' 4\")  Wt 62.7 kg (138 lb 3.2 oz)  SpO2 100%  BMI 23.72 kg/m2  BMI= Body mass index is 30.69 kg/(m^2).   Constitutional: alert and no distress.   Neck: Neck supple. No adenopathy. Thyroid symmetric, normal size  HEENT:Normocephalic. No masses, lesions, tenderness or abnormalities. No temporal muscle wasting ENT exam normal, no neck nodes or sinus tenderness. No oral lesions  Cardiovascular: negative, No lifts, heaves, or thrills. RRR. No murmurs, clicks gallops or rub  Respiratory: negative, Good diaphragmatic excursion. Lungs clear. No wheezes or rales  Gastrointestinal: Abdomen soft, non-tender. BS normal.  No masses, organomegaly  Skin: no suspicious lesions or rashes. No spider angiomata or palmar erythema. Nails normal.  Neurologic: Alert and oriented x3. No asterixis or tremor. Gait normal.   Psychiatric:  Appropriate, well groomed.  Hematologic/Lymphatic/Immunologic: Normal cervical and supraclavicular  lymph " nodes        Sandra Sutton MD

## 2018-04-18 ENCOUNTER — MYC MEDICAL ADVICE (OUTPATIENT)
Dept: FAMILY MEDICINE | Facility: CLINIC | Age: 38
End: 2018-04-18

## 2018-04-18 DIAGNOSIS — K64.8 OTHER HEMORRHOIDS: Primary | ICD-10-CM

## 2018-04-20 RX ORDER — HYDROCORTISONE ACETATE 25 MG/1
25 SUPPOSITORY RECTAL 2 TIMES DAILY PRN
Qty: 30 SUPPOSITORY | Refills: 1 | Status: SHIPPED | OUTPATIENT
Start: 2018-04-20 | End: 2018-10-02

## 2018-04-23 ENCOUNTER — TELEPHONE (OUTPATIENT)
Dept: INTERNAL MEDICINE | Facility: CLINIC | Age: 38
End: 2018-04-23

## 2018-04-23 NOTE — TELEPHONE ENCOUNTER
Dr. Burt,     We received a request from patient's [facility] for an alternative to the hydrocortisone suppositories that were recently prescribed. This is not being covered by insurance. Is there an alternative medication that can be prescribed?

## 2018-05-16 ASSESSMENT — ENCOUNTER SYMPTOMS
ABDOMINAL PAIN: 0
VOMITING: 0
POOR WOUND HEALING: 0
BLOATING: 0
JAUNDICE: 0
NAIL CHANGES: 0
RECTAL PAIN: 1
HEARTBURN: 1
NAUSEA: 0
CONSTIPATION: 1
BOWEL INCONTINENCE: 0
BLOOD IN STOOL: 0
DIARRHEA: 0
SKIN CHANGES: 0

## 2018-05-17 ENCOUNTER — OFFICE VISIT (OUTPATIENT)
Dept: SURGERY | Facility: CLINIC | Age: 38
End: 2018-05-17
Payer: COMMERCIAL

## 2018-05-17 VITALS
BODY MASS INDEX: 23.41 KG/M2 | SYSTOLIC BLOOD PRESSURE: 106 MMHG | OXYGEN SATURATION: 100 % | DIASTOLIC BLOOD PRESSURE: 69 MMHG | HEART RATE: 95 BPM | TEMPERATURE: 98.7 F | WEIGHT: 137.1 LBS | HEIGHT: 64 IN

## 2018-05-17 DIAGNOSIS — L29.0 PRURITUS ANI: ICD-10-CM

## 2018-05-17 DIAGNOSIS — K64.8 HEMORRHOID PROLAPSE: Primary | ICD-10-CM

## 2018-05-17 ASSESSMENT — PAIN SCALES - GENERAL: PAINLEVEL: NO PAIN (0)

## 2018-05-17 NOTE — PATIENT INSTRUCTIONS
1) I performed hemorrhoidal banding today  2) You may return in 4 weeks for another banding if symptoms continue.  3) There is a small risk of bleeding and remote chance of infection. Contact us in the interim if there are any concerns.   5) Try a fiber supplement such as Citrucel 2-3 times a day for constipation  6) Drink 8-10 glasses of water with this  7) No heavy lifting or aspirin use for 3 weeks

## 2018-05-17 NOTE — LETTER
2018      RE: Karen Beverly  [address] [place] [address]       Colon and Rectal Surgery Consult Clinic Note    Date: 2018     Referring provider:  Lars Burt MD  [address] [place] [address]     RE: Karen Beverly  : 1980  WALTER: 2018    Karen Beverly is a very pleasant 38 year old female without a significant past medical history with a recent diagnosis of hmorrhoids.  Given these findings they were subsequently sent to the [facility] for an opinion on this and a new patient consultation.     Karen reports developing hemorrhoids for the first time 5 years ago after her first pregnancy.  These have been getting progressively more symptomatic.  She has itching, slight pinching pain, and occasional pain with bowel movements.  She has used over-the-counter products and her symptoms have improved but have not resolved.  She continues to have some itching.  She denies any bleeding.  She does have some tissue that pops out with bowel movements and needs to be manually reduced.  She is on a high-fiber diet and typically has soft stools with only rare hard stools and no diarrhea.  She does feel as though she is always wet and has a hard time keeping clean as if something is leaking.  She has had 2 vaginal births with her largest baby being 7 pounds.  She did have one episiotomy and tore once.  She had a colonoscopy in , which was normal.    Assessment/Plan: 38 year old female with hemorrhoid prolapse and pruritus any.  On exam she does have a prolapsing hemorrhoid in the posterior midline.  Discussed managing this with hemorrhoid banding.  Discussed the risks of bleeding today and when the band falls off in 1-2 weeks.  Asked that she avoid any heavy lifting and blood thinners for 1-2 weeks.  She states an understanding of these risks and wished to proceed with banding today.  One band was placed in the posterior position.  I recommended that she  start on a daily fiber supplement to bulk up her stools as this may improve the leakage and skin irritation.  She can use calmoseptine cream on her perianal skin for the irritation as well.  Encouraged her to return to clinic after 4 weeks if she has any persistent symptoms.  Patient's questions were answered to his stated satisfaction and he is in agreement with this plan.    Medical history:  Past Medical History:   Diagnosis Date     Eczema      Endometriosis      Thyroid disease        Surgical history:  Past Surgical History:   Procedure Laterality Date     DAVINCI PELVIC PROCEDURE  2/22/2012    Procedure:DAVINCI PELVIC PROCEDURE; DAVINCI DIAGNOSTIC PELVISCOPY WITH OMNIGUIDE C02 LASER, DIAGNOSTIC HYSTEROSCOPY, EXTENSIVE LYSIS EXCISION OF ENDOMETRIOSIS, BILATERAL URETEROLYSIS, D & C; Surgeon:JOHN KELLY; Location: OR       Problem list:  Patient Active Problem List    Diagnosis Date Noted     Gastroesophageal reflux disease without esophagitis 12/13/2017     Priority: Medium     Rhinoconjunctivitis 10/02/2017     Priority: Medium     Chronic sinusitis, unspecified location 10/02/2017     Priority: Medium     Indication for care in labor or delivery 03/14/2017     Priority: Medium     Labor and delivery, indication for care 03/14/2017     Priority: Medium     Hashimoto's thyroiditis 12/24/2016     Priority: Medium     History of multiple miscarriages 03/01/2016     Priority: Medium     Thyroid disease, antepartum 09/19/2013     Priority: Medium     Elevated TSH 09/08/2013     Priority: Medium     Pregnancy related condition 02/12/2013     Priority: Medium       Medications:  Current Outpatient Prescriptions   Medication Sig Dispense Refill     hydrocortisone (ANUSOL-HC) 2.5 % cream Place rectally 2 times daily as needed for hemorrhoids 30 g 1     hydrocortisone (ANUSOL-HC) 25 MG Suppository Place 1 suppository (25 mg) rectally 2 times daily as needed for hemorrhoids 30 suppository 1     levothyroxine  "(SYNTHROID/LEVOTHROID) 75 MCG tablet Take 1 tablet (75 mcg) by mouth daily 90 tablet 3     PRENATAL VITAMINS PO Take 1 tablet by mouth daily.           Allergies:  Allergies   Allergen Reactions     Augmentin Nausea and Vomiting     Latex Rash       Family history:  Family History   Problem Relation Age of Onset     Hypertension Mother      CANCER Other      liver cancer     Liver Cancer Maternal Grandmother 79     Hypertension Maternal Grandfather      HEART DISEASE Paternal Grandmother 62     HEART DISEASE Paternal Grandfather 60       Social history:  Social History   Substance Use Topics     Smoking status: Never Smoker     Smokeless tobacco: Never Used     Alcohol use No    Marital status: .  Occupation: stay at home mom.    Nursing Notes:   Sybil Falk LPN  5/17/2018  7:17 AM  Signed  Chief Complaint   Patient presents with     Rectal Problem     hemorrhoids       Vitals:    05/17/18 0715   BP: 106/69   Pulse: 95   Temp: 98.7  F (37.1  C)   TempSrc: Oral   SpO2: 100%   Weight: 137 lb 1.6 oz   Height: 5' 4\"       Body mass index is 23.53 kg/(m^2).      Sybil CORREA LPN                       Physical Examination:  /69  Pulse 95  Temp 98.7  F (37.1  C) (Oral)  Ht 5' 4\"  Wt 137 lb 1.6 oz  SpO2 100%  BMI 23.53 kg/m2  General: Alert, oriented, in no acute distress, sitting comfortably  HEENT: Mucous membranes moist  Perianal external examination: Exam was chaperoned by Sybil Falk LPN   Perianal skin: Some mild excoriation with fecal material present.  Lesions: No evidence of an external lesion, nodularity, or induration in the perianal region.  Eversion of buttocks: There was not evidence of an anal fissure. Details: N/A.  Skin tags or external hemorrhoids: Yes: Some prolapsing hemorrhoidal tissue in the posterior position without bleeding or thrombosis.  Digital rectal examination: Was performed.   Sphincter tone: Good.  Palpable lesions: No.  Other: None.  Bimanual examination: was not " performed    Anoscopy: Was performed.   Hemorrhoids: Yes.  Grade 2 internal hemorrhoid in the posterior position without active bleeding  Lesions: No    Procedures:  After discussing the risks and benefits, the patient agreed to proceed with internal hemorrhoidal banding.    Prior to the start of the procedure and with procedural staff participation, I verbally confirmed the patient s identity using two indicators, relevant allergies, that the procedure was appropriate and matched the consent or emergent situation, and that the correct equipment/implants were available. Immediately prior to starting the procedure I conducted the Time Out with the procedural staff and re-confirmed the patient s name, procedure, and site/side. (The Joint Commission universal protocol was followed.)  Yes    Sedation (Moderate or Deep): None    A suction hemorrhoidal  was used to place a total of 1 band(s) in the posterior position(s).    There was no significant bleeding. The patient tolerated the procedure well.    This procedure was performed under a collaborative privileging agreement with Dr. Irizarry, Chief of Colon and Rectal Surgery.    Total face to face time was 20 minutes, outside the procedure time, >50% counseling.    ULICES Tadeo, NP-C  Colon and Rectal Surgery   [facility]    This note was created using speech recognition software and may contain unintended word substitutions.      ULICES Tadeo CNP

## 2018-05-17 NOTE — MR AVS SNAPSHOT
After Visit Summary   5/17/2018    Karen Beverly    MRN: [id number]           Patient Information     Date Of Birth          1980        Visit Information        Provider Department      5/17/2018 7:15 AM Corinna Burroughs APRN [facility]        Care Instructions    1) I performed hemorrhoidal banding today  2) You may return in 4 weeks for another banding if symptoms continue.  3) There is a small risk of bleeding and remote chance of infection. Contact us in the interim if there are any concerns.   5) Try a fiber supplement such as Citrucel 2-3 times a day for constipation  6) Drink 8-10 glasses of water with this  7) No heavy lifting or aspirin use for 3 weeks              Follow-ups after your visit        Your next 10 appointments already scheduled     Jul 02, 2018 10:00 AM CDT   (Arrive by 9:45 AM)   [facility] Return with Jayne Zapata MD   [facility] ([facility] and [facility] [place])    [address] [place] [address]  3rd [facility] [address] [phone]            Feb 18, 2019 10:30 AM CST   (Arrive by 10:15 AM)   PHYSICAL with Lars Burt MD   [facility] ([facility] [place])    [address] [place] [address]  [address] [facility] [address] [phone]              Who to contact     Please call your clinic at [phone] to:    Ask questions about your health    Make or cancel appointments    Discuss your medicines    Learn about your test results    Speak to your doctor            Additional Information About Your Visit        MyChart Information     [facility] gives you secure access to your electronic health record. If you see a primary care provider, you can also send messages to your care team and make appointments. If you have questions, please call your primary care clinic.  If you do not have a primary care provider, please call [phone] and they will  "assist you.      [employer] is an electronic gateway that provides easy, online access to your medical records. With [employer], you can request a clinic appointment, read your test results, renew a prescription or communicate with your care team.     To access your existing account, please contact your [facility] or call [phone] for assistance.        Care EveryWhere ID     This is your Care EveryWhere ID. This could be used by other organizations to access your [facility] medical records  [id number]        Your Vitals Were     Pulse Temperature Height Pulse Oximetry BMI (Body Mass Index)       95 98.7  F (37.1  C) (Oral) 5' 4\" 100% 23.53 kg/m2        Blood Pressure from Last 3 Encounters:   05/17/18 106/69   04/02/18 100/63   03/19/18 99/61    Weight from Last 3 Encounters:   05/17/18 137 lb 1.6 oz   04/02/18 138 lb 3.2 oz   03/19/18 136 lb 1.6 oz              Today, you had the following     No orders found for display       Primary Care Provider Office Phone # Fax #    Lars Burt [phone]       [address] [facility] [address]        Equal Access to Services     DEVANTE HARDING AH: Hadii jose m munizo Sobhargavi, waaxda luqadaha, qaybta kaalmada adeegyada, maria isabel brewer. So [facility] [phone].    ATENCIÓN: Si habla español, tiene a christiansen disposición servicios gratuitos de asistencia lingüística. Llame al [phone].    We comply with applicable federal civil rights laws and [place] laws. We do not discriminate on the basis of race, color, national origin, age, disability, sex, sexual orientation, or gender identity.            Thank you!     Thank you for choosing [facility] COLON AND RECTAL SURGERY  for your care. Our goal is always to provide you with excellent care. Hearing back from our patients is one way we can continue to improve our services. Please take a few minutes to complete the written survey that you may receive " in the mail after your visit with us. Thank you!             Your Updated Medication List - Protect others around you: Learn how to safely use, store and throw away your medicines at www.disposemymeds.org.          This list is accurate as of 5/17/18  7:44 AM.  Always use your most recent med list.                   Brand Name Dispense Instructions for use Diagnosis    hydrocortisone 2.5 % cream    ANUSOL-HC    30 g    Place rectally 2 times daily as needed for hemorrhoids    Other hemorrhoids       hydrocortisone 25 MG Suppository    ANUSOL-HC    30 suppository    Place 1 suppository (25 mg) rectally 2 times daily as needed for hemorrhoids    Other hemorrhoids       levothyroxine 75 MCG tablet    SYNTHROID/LEVOTHROID    90 tablet    Take 1 tablet (75 mcg) by mouth daily    Hashimoto's thyroiditis       PRENATAL VITAMINS PO      Take 1 tablet by mouth daily.

## 2018-05-17 NOTE — NURSING NOTE
"Chief Complaint   Patient presents with     Rectal Problem     hemorrhoids       Vitals:    05/17/18 0715   BP: 106/69   Pulse: 95   Temp: 98.7  F (37.1  C)   TempSrc: Oral   SpO2: 100%   Weight: 137 lb 1.6 oz   Height: 5' 4\"       Body mass index is 23.53 kg/(m^2).      Sybil CORREA LPN                    "

## 2018-05-17 NOTE — PROGRESS NOTES
Colon and Rectal Surgery Consult Clinic Note    Date: 2018     Referring provider:  Lars Burt MD  [address] [place] [address]     RE: Karen Beverly  : 1980  WALTER: 2018    Karen Beverly is a very pleasant 38 year old female without a significant past medical history with a recent diagnosis of hmorrhoids.  Given these findings they were subsequently sent to the Colon and Rectal Surgery Clinic for an opinion on this and a new patient consultation.     Karen reports developing hemorrhoids for the first time 5 years ago after her first pregnancy.  These have been getting progressively more symptomatic.  She has itching, slight pinching pain, and occasional pain with bowel movements.  She has used over-the-counter products and her symptoms have improved but have not resolved.  She continues to have some itching.  She denies any bleeding.  She does have some tissue that pops out with bowel movements and needs to be manually reduced.  She is on a high-fiber diet and typically has soft stools with only rare hard stools and no diarrhea.  She does feel as though she is always wet and has a hard time keeping clean as if something is leaking.  She has had 2 vaginal births with her largest baby being 7 pounds.  She did have one episiotomy and tore once.  She had a colonoscopy in , which was normal.    Assessment/Plan: 38 year old female with hemorrhoid prolapse and pruritus any.  On exam she does have a prolapsing hemorrhoid in the posterior midline.  Discussed managing this with hemorrhoid banding.  Discussed the risks of bleeding today and when the band falls off in 1-2 weeks.  Asked that she avoid any heavy lifting and blood thinners for 1-2 weeks.  She states an understanding of these risks and wished to proceed with banding today.  One band was placed in the posterior position.  I recommended that she start on a daily fiber supplement to bulk up her stools as this may improve  the leakage and skin irritation.  She can use calmoseptine cream on her perianal skin for the irritation as well.  Encouraged her to return to clinic after 4 weeks if she has any persistent symptoms.  Patient's questions were answered to his stated satisfaction and he is in agreement with this plan.    Medical history:  Past Medical History:   Diagnosis Date     Eczema      Endometriosis      Thyroid disease        Surgical history:  Past Surgical History:   Procedure Laterality Date     DAVINCI PELVIC PROCEDURE  2/22/2012    Procedure:DAVINCI PELVIC PROCEDURE; DAVINCI DIAGNOSTIC PELVISCOPY WITH OMNIGUIDE C02 LASER, DIAGNOSTIC HYSTEROSCOPY, EXTENSIVE LYSIS EXCISION OF ENDOMETRIOSIS, BILATERAL URETEROLYSIS, D & C; Surgeon:JOHN KELLY; Location: OR       Problem list:  Patient Active Problem List    Diagnosis Date Noted     Gastroesophageal reflux disease without esophagitis 12/13/2017     Priority: Medium     Rhinoconjunctivitis 10/02/2017     Priority: Medium     Chronic sinusitis, unspecified location 10/02/2017     Priority: Medium     Indication for care in labor or delivery 03/14/2017     Priority: Medium     Labor and delivery, indication for care 03/14/2017     Priority: Medium     Hashimoto's thyroiditis 12/24/2016     Priority: Medium     History of multiple miscarriages 03/01/2016     Priority: Medium     Thyroid disease, antepartum 09/19/2013     Priority: Medium     Elevated TSH 09/08/2013     Priority: Medium     Pregnancy related condition 02/12/2013     Priority: Medium       Medications:  Current Outpatient Prescriptions   Medication Sig Dispense Refill     hydrocortisone (ANUSOL-HC) 2.5 % cream Place rectally 2 times daily as needed for hemorrhoids 30 g 1     hydrocortisone (ANUSOL-HC) 25 MG Suppository Place 1 suppository (25 mg) rectally 2 times daily as needed for hemorrhoids 30 suppository 1     levothyroxine (SYNTHROID/LEVOTHROID) 75 MCG tablet Take 1 tablet (75 mcg) by mouth daily  "90 tablet 3     PRENATAL VITAMINS PO Take 1 tablet by mouth daily.           Allergies:  Allergies   Allergen Reactions     Augmentin Nausea and Vomiting     Latex Rash       Family history:  Family History   Problem Relation Age of Onset     Hypertension Mother      CANCER Other      liver cancer     Liver Cancer Maternal Grandmother 79     Hypertension Maternal Grandfather      HEART DISEASE Paternal Grandmother 62     HEART DISEASE Paternal Grandfather 60       Social history:  Social History   Substance Use Topics     Smoking status: Never Smoker     Smokeless tobacco: Never Used     Alcohol use No    Marital status: .  Occupation: stay at home mom.    Nursing Notes:   Sybil Falk LPN  5/17/2018  7:17 AM  Signed  Chief Complaint   Patient presents with     Rectal Problem     hemorrhoids       Vitals:    05/17/18 0715   BP: 106/69   Pulse: 95   Temp: 98.7  F (37.1  C)   TempSrc: Oral   SpO2: 100%   Weight: 137 lb 1.6 oz   Height: 5' 4\"       Body mass index is 23.53 kg/(m^2).      Sybil CORREA LPN                       Physical Examination:  /69  Pulse 95  Temp 98.7  F (37.1  C) (Oral)  Ht 5' 4\"  Wt 137 lb 1.6 oz  SpO2 100%  BMI 23.53 kg/m2  General: Alert, oriented, in no acute distress, sitting comfortably  HEENT: Mucous membranes moist  Perianal external examination: Exam was chaperoned by Sybil Falk LPN   Perianal skin: Some mild excoriation with fecal material present.  Lesions: No evidence of an external lesion, nodularity, or induration in the perianal region.  Eversion of buttocks: There was not evidence of an anal fissure. Details: N/A.  Skin tags or external hemorrhoids: Yes: Some prolapsing hemorrhoidal tissue in the posterior position without bleeding or thrombosis.  Digital rectal examination: Was performed.   Sphincter tone: Good.  Palpable lesions: No.  Other: None.  Bimanual examination: was not performed    Anoscopy: Was performed.   Hemorrhoids: Yes.  Grade 2 internal " hemorrhoid in the posterior position without active bleeding  Lesions: No    Procedures:  After discussing the risks and benefits, the patient agreed to proceed with internal hemorrhoidal banding.    Prior to the start of the procedure and with procedural staff participation, I verbally confirmed the patient s identity using two indicators, relevant allergies, that the procedure was appropriate and matched the consent or emergent situation, and that the correct equipment/implants were available. Immediately prior to starting the procedure I conducted the Time Out with the procedural staff and re-confirmed the patient s name, procedure, and site/side. (The Joint Commission universal protocol was followed.)  Yes    Sedation (Moderate or Deep): None    A suction hemorrhoidal  was used to place a total of 1 band(s) in the posterior position(s).    There was no significant bleeding. The patient tolerated the procedure well.    This procedure was performed under a collaborative privileging agreement with Dr. Irizarry, Chief of Colon and Rectal Surgery.    Total face to face time was 20 minutes, outside the procedure time, >50% counseling.    ULICES Tadeo, NP-C  Colon and Rectal Surgery   [facility]    This note was created using speech recognition software and may contain unintended word substitutions.

## 2018-07-02 ENCOUNTER — OFFICE VISIT (OUTPATIENT)
Dept: ENDOCRINOLOGY | Facility: CLINIC | Age: 38
End: 2018-07-02
Payer: COMMERCIAL

## 2018-07-02 VITALS
DIASTOLIC BLOOD PRESSURE: 61 MMHG | HEIGHT: 64 IN | SYSTOLIC BLOOD PRESSURE: 97 MMHG | BODY MASS INDEX: 23.54 KG/M2 | HEART RATE: 73 BPM | WEIGHT: 137.9 LBS

## 2018-07-02 DIAGNOSIS — R35.89 POLYURIA: ICD-10-CM

## 2018-07-02 DIAGNOSIS — E06.3 HASHIMOTO'S THYROIDITIS: ICD-10-CM

## 2018-07-02 DIAGNOSIS — E06.3 HASHIMOTO'S THYROIDITIS: Primary | ICD-10-CM

## 2018-07-02 DIAGNOSIS — R63.5 WEIGHT INCREASED: ICD-10-CM

## 2018-07-02 DIAGNOSIS — R73.09 OTHER ABNORMAL GLUCOSE: ICD-10-CM

## 2018-07-02 DIAGNOSIS — R73.03 PREDIABETES: ICD-10-CM

## 2018-07-02 LAB
HBA1C MFR BLD: 5.9 % (ref 0–5.6)
T4 FREE SERPL-MCNC: 1 NG/DL (ref 0.76–1.46)
TSH SERPL DL<=0.005 MIU/L-ACNC: 1.87 MU/L (ref 0.4–4)

## 2018-07-02 RX ORDER — LEVOTHYROXINE SODIUM 75 UG/1
75 TABLET ORAL DAILY
Qty: 90 TABLET | Refills: 3 | Status: SHIPPED | OUTPATIENT
Start: 2018-07-02 | End: 2019-06-24

## 2018-07-02 NOTE — MR AVS SNAPSHOT
After Visit Summary   7/2/2018    Karen Beverly    MRN: [id number]           Patient Information     Date Of Birth          1980        Visit Information        Provider Department      7/2/2018 10:00 AM Jayne Zapata MD [facility]        Today's Diagnoses     Hashimoto's thyroiditis    -  1    Weight increased        Polyuria           Follow-ups after your visit        Follow-up notes from your care team     Return in about 1 year (around 7/2/2019) for labs today, labs prior to f/up apt.      Your next 10 appointments already scheduled     Feb 18, 2019 10:30 AM CST   (Arrive by 10:15 AM)   PHYSICAL with Lars Burt MD   [facility] ([facility])    [address] [place] [id number] [phone]              Future tests that were ordered for you today     Open Future Orders        Priority Expected Expires Ordered    TSH Routine 7/1/2019 7/2/2019 7/2/2018    T4 free Routine 7/1/2019 7/2/2019 7/2/2018            Who to contact     Please call your clinic at [phone] to:    Ask questions about your health    Make or cancel appointments    Discuss your medicines    Learn about your test results    Speak to your doctor            Additional Information About Your Visit        [employer] Information     [employer] gives you secure access to your electronic health record. If you see a primary care provider, you can also send messages to your care team and make appointments. If you have questions, please call your primary care clinic.  If you do not have a primary care provider, please call [phone] and they will assist you.      [employer] is an electronic gateway that provides easy, online access to your medical records. With [employer], you can request a clinic appointment, read your test results, renew a prescription or communicate with your care team.     To access your existing account, please  "contact your [facility] or call [phone] for assistance.        Care EveryWhere ID     This is your Care EveryWhere ID. This could be used by other organizations to access your [facility] medical records  [id number]        Your Vitals Were     Pulse Height BMI (Body Mass Index)             73 1.626 m (5' 4\") 23.67 kg/m2          Blood Pressure from Last 3 Encounters:   07/02/18 97/61   05/17/18 106/69   04/02/18 100/63    Weight from Last 3 Encounters:   07/02/18 62.6 kg (137 lb 14.4 oz)   05/17/18 62.2 kg (137 lb 1.6 oz)   04/02/18 62.7 kg (138 lb 3.2 oz)                 Where to get your medicines      These medications were sent to [id number] [place] - [address]  [address], [place] [address]     Phone:  [phone]     levothyroxine 75 MCG tablet          Primary Care Provider Office Phone # Fax #    Lars Burt [phone]       [address] [facility] [address]        Equal Access to Services     [facility] : Hadii jose m munizo Sobhargavi, waaxda luretaadaha, qaybta kaalmada aderosemarieyada, maria isabel brewer. [facility] [phone].    ATENCIÓN: Si habla español, tiene a christiansen disposición servicios gratuitos de asistencia lingüística. [facility] [phone].    We comply with applicable federal civil rights laws and [place] laws. We do not discriminate on the basis of race, color, national origin, age, disability, sex, sexual orientation, or gender identity.            Thank you!     Thank you for choosing [facility]  for your care. Our goal is always to provide you with excellent care. Hearing back from our patients is one way we can continue to improve our services. Please take a few minutes to complete the written survey that you may receive in the mail after your visit with us. Thank you!             Your Updated Medication List - Protect others around you: Learn how to safely use, store " and throw away your medicines at www.disposemymeds.org.          This list is accurate as of 7/2/18 11:10 AM.  Always use your most recent med list.                   Brand Name Dispense Instructions for use Diagnosis    hydrocortisone 2.5 % cream    ANUSOL-HC    30 g    Place rectally 2 times daily as needed for hemorrhoids    Other hemorrhoids       hydrocortisone 25 MG Suppository    ANUSOL-HC    30 suppository    Place 1 suppository (25 mg) rectally 2 times daily as needed for hemorrhoids    Other hemorrhoids       levothyroxine 75 MCG tablet    SYNTHROID/LEVOTHROID    90 tablet    Take 1 tablet (75 mcg) by mouth daily    Hashimoto's thyroiditis       PRENATAL VITAMINS PO      Take 1 tablet by mouth daily.

## 2018-07-02 NOTE — PROGRESS NOTES
The patient is seen in f/up for Hashimoto thyroiditis, diagnosed in 2013.     Karen Diehl is a 38 year old female who was found to have an elevated TSH while having lab work done in [place], in August 2013, approximately 6 months postdelivery, when she developed fatigue, hair loss, and joint pain. In the fall of 2013, she established medical care here and she was formally diagnosed with Hashimoto thyroiditis. Since 2013, she was treated with a stable dose of 75  g levothyroxine daily, until she got pregnant.  She delivered in March 2017 and, since delivery, she went back on her regular dose of levothyroxine.     Her children are now 5 and 1-year-old.  She is still breast-feeding.  She complains about having difficulty losing weight.  Her weight is 7-8 pounds above her prepregnancy weight.  She has not been able to exercise regularly, as she has been dealing with recurrent hemorrhoids.  Currently, she does not follow a diet but she reports eating non-processed foods, home cooked meals.    Past Medical History:   Diagnosis Date     Eczema      Endometriosis      Thyroid disease    Iron deficiency anemia   Endometriosis   GERD  Internal hemorrhoids  Miscarriage 2016    Past Surgical History:   Procedure Laterality Date     DAVINCI PELVIC PROCEDURE  2/22/2012    Procedure:DAVINCI PELVIC PROCEDURE; DAVINCI DIAGNOSTIC PELVISCOPY WITH OMNIGUIDE C02 LASER, DIAGNOSTIC HYSTEROSCOPY, EXTENSIVE LYSIS EXCISION OF ENDOMETRIOSIS, BILATERAL URETEROLYSIS, D & C; Surgeon:JOHN KELLY; Location: OR     Current Medications    Current Outpatient Prescriptions:      hydrocortisone (ANUSOL-HC) 2.5 % cream, Place rectally 2 times daily as needed for hemorrhoids, Disp: 30 g, Rfl: 1     hydrocortisone (ANUSOL-HC) 25 MG Suppository, Place 1 suppository (25 mg) rectally 2 times daily as needed for hemorrhoids, Disp: 30 suppository, Rfl: 1     levothyroxine (SYNTHROID/LEVOTHROID) 75 MCG tablet, Take 1 tablet (75 mcg) by mouth  "daily, Disp: 90 tablet, Rfl: 3     PRENATAL VITAMINS PO, Take 1 tablet by mouth daily.  , Disp: , Rfl:     Family History   Problem Relation Age of Onset     Hypertension Mother      Cancer Maternal grandmother       liver cancer   Maternal grandmother HTN.     Social History   with one child. She denies smoking, drinking alcohol or using illicit drugs. Occupation: stay at home mom.     Review of Systems   Systemic:             Intermittent episodes of fatigue, weight up 8 lbs above her prepregnancy test   Eye:                      Eye glasses   Chanel-Laryngeal:     No dysphagia, occasional voice hoarseness; still has the tickle in her throat - intermittently   Breast:                  No breast symptoms  Cardiovascular:    No cardiovascular symptoms, no CP or palpitations   Pulmonary:           no SOB  Gastrointestinal:   Intermittent constipation relieved by high fiber intake - stable   Genitourinary:       no increased thirst; urinates 5-7 times a night, she drinks 250 ml at night   Endocrine:            Cold intolerance - was present prior to pregnancy and unchanged.   Neurological:        rare headaches, no tremor, no numbness or tingling sensation, no dizziness   Musculoskeletal:  R knee, B/L wrist and elbow pain with prolonged movements   Skin:                    no hair loss; dry skin   Psychological:     Increased irritability and stress                 Vital Signs     Previous Weights:    Wt Readings from Last 5 Encounters:   07/02/18 62.6 kg (137 lb 14.4 oz)   05/17/18 62.2 kg (137 lb 1.6 oz)   04/02/18 62.7 kg (138 lb 3.2 oz)   03/19/18 61.7 kg (136 lb 1.6 oz)   12/13/17 62.9 kg (138 lb 11.2 oz)        BP 97/61 (BP Location: Right arm, Patient Position: Sitting, Cuff Size: Adult Regular)  Pulse 73  Ht 1.626 m (5' 4\")  Wt 62.6 kg (137 lb 14.4 oz)  BMI 23.67 kg/m2    Physical Exam  General Appearance: she is well developed, well nourished and in no distress     Eyes:  conjutivae and extra-ocular " motions are normal.                                    pupils round and reactive to light, no lid lag, no stare    HEENT:   oropharynx clear and moist, no JVD, no bruits      no thyromegaly, palpable irregularity of the thyroid isthmus, with no delineable nodule  Cardiovascular:  regular rhythm, no murmurs, distal pulse palpable, no edema  Respiratory:        chest clear, no rales, no rhonchi   Gastrointestinal:  abdomen soft, non-tender, non-distended, normal bowel sounds,    no organomegaly  Musculoskeletal:  normal tone and strength  Psychological:          affect and judgment normal  Skin:  itchy rash on the arms, around the hair follicles   Neurological:  reflexes normal and symmetric, no resting tremor.      Lab Results  I reviewed prior lab results documented in EPIC.   TSH   Date Value Ref Range Status   12/04/2017 2.38 0.40 - 4.00 mU/L Final     T4 Free   Date Value Ref Range Status   08/24/2017 0.87 0.76 - 1.46 ng/dL Final     Lab Results   Component Value Date    A1C 5.4 08/08/2016     Assessment     1. Hashimoto thyroiditis  Clinically, the patient does not endorse definite signs or symptoms suggestive of hypothyroidism.  We are going to check the thyroid hormone levels today and adjust the dose of levothyroxine accordingly.    2.  Difficulty losing weight.  Her BMI is normal.  While I do not think this is an issue, discussed with the patient about the importance of establishing a regular exercise schedule, which might also help improve her energy level, joint pain and irritability.    3. Polyuria   This has been a chronic issue but it appears to be worsened since her last visit here.  The patient describes some urinary urgency and a feeling of incompletely emptying the bladder.  Advised to follow-up with her primary care provider, as she may require an urological assessment.  I also advised to screen for diabetes by checking the A1c today.    Orders Placed This Encounter   Procedures     T4 free     TSH      Hemoglobin A1c     TSH     T4 free

## 2018-07-02 NOTE — LETTER
7/2/2018       RE: Karen Beverly  [address]  [place] [address]     Dear Colleague,    Thank you for referring your patient, Karen Beverly, to the [facility] ENDOCR[facility] at [facility]. Please see a copy of my visit note below.      The patient is seen in f/up for Hashimoto thyroiditis, diagnosed in 2013.     Karen Diehl is a 38 year old female who was found to have an elevated TSH while having lab work done in [place], in August 2013, approximately 6 months postdelivery, when she developed fatigue, hair loss, and joint pain. In the fall of 2013, she established medical care here and she was formally diagnosed with Hashimoto thyroiditis. Since 2013, she was treated with a stable dose of 75  g levothyroxine daily, until she got pregnant.  She delivered in March 2017 and, since delivery, she went back on her regular dose of levothyroxine.     Her children are now 5 and 1-year-old.  She is still breast-feeding.  She complains about having difficulty losing weight.  Her weight is 7-8 pounds above her prepregnancy weight.  She has not been able to exercise regularly, as she has been dealing with recurrent hemorrhoids.  Currently, she does not follow a diet but she reports eating non-processed foods, home cooked meals.    Past Medical History:   Diagnosis Date     Eczema      Endometriosis      Thyroid disease    Iron deficiency anemia   Endometriosis   GERD  Internal hemorrhoids  Miscarriage 2016    Past Surgical History:   Procedure Laterality Date     DAVINCI PELVIC PROCEDURE  2/22/2012    Procedure:DAVINCI PELVIC PROCEDURE; DAVINCI DIAGNOSTIC PELVISCOPY WITH OMNIGUIDE C02 LASER, DIAGNOSTIC HYSTEROSCOPY, EXTENSIVE LYSIS EXCISION OF ENDOMETRIOSIS, BILATERAL URETEROLYSIS, D & C; Surgeon:JOHN KELLY; Location: OR     Current Medications    Current Outpatient Prescriptions:      hydrocortisone (ANUSOL-HC) 2.5 % cream, Place rectally 2 times daily as needed for  hemorrhoids, Disp: 30 g, Rfl: 1     hydrocortisone (ANUSOL-HC) 25 MG Suppository, Place 1 suppository (25 mg) rectally 2 times daily as needed for hemorrhoids, Disp: 30 suppository, Rfl: 1     levothyroxine (SYNTHROID/LEVOTHROID) 75 MCG tablet, Take 1 tablet (75 mcg) by mouth daily, Disp: 90 tablet, Rfl: 3     PRENATAL VITAMINS PO, Take 1 tablet by mouth daily.  , Disp: , Rfl:     Family History   Problem Relation Age of Onset     Hypertension Mother      Cancer Maternal grandmother       liver cancer   Maternal grandmother HTN.     Social History   with one child. She denies smoking, drinking alcohol or using illicit drugs. Occupation: stay at home mom.     Review of Systems   Systemic:             Intermittent episodes of fatigue, weight up 8 lbs above her prepregnancy test   Eye:                      Eye glasses   Chanel-Laryngeal:     No dysphagia, occasional voice hoarseness; still has the tickle in her throat - intermittently   Breast:                  No breast symptoms  Cardiovascular:    No cardiovascular symptoms, no CP or palpitations   Pulmonary:           no SOB  Gastrointestinal:   Intermittent constipation relieved by high fiber intake - stable   Genitourinary:       no increased thirst; urinates 5-7 times a night, she drinks 250 ml at night   Endocrine:            Cold intolerance - was present prior to pregnancy and unchanged.   Neurological:        rare headaches, no tremor, no numbness or tingling sensation, no dizziness   Musculoskeletal:  R knee, B/L wrist and elbow pain with prolonged movements   Skin:                    no hair loss; dry skin   Psychological:     Increased irritability and stress                 Vital Signs     Previous Weights:    Wt Readings from Last 5 Encounters:   07/02/18 62.6 kg (137 lb 14.4 oz)   05/17/18 62.2 kg (137 lb 1.6 oz)   04/02/18 62.7 kg (138 lb 3.2 oz)   03/19/18 61.7 kg (136 lb 1.6 oz)   12/13/17 62.9 kg (138 lb 11.2 oz)        BP 97/61 (BP Location:  "Right arm, Patient Position: Sitting, Cuff Size: Adult Regular)  Pulse 73  Ht 1.626 m (5' 4\")  Wt 62.6 kg (137 lb 14.4 oz)  BMI 23.67 kg/m2    Physical Exam  General Appearance: she is well developed, well nourished and in no distress     Eyes:  conjutivae and extra-ocular motions are normal.                                    pupils round and reactive to light, no lid lag, no stare    HEENT:   oropharynx clear and moist, no JVD, no bruits      no thyromegaly, palpable irregularity of the thyroid isthmus, with no delineable nodule  Cardiovascular:  regular rhythm, no murmurs, distal pulse palpable, no edema  Respiratory:        chest clear, no rales, no rhonchi   Gastrointestinal:  abdomen soft, non-tender, non-distended, normal bowel sounds,    no organomegaly  Musculoskeletal:  normal tone and strength  Psychological:          affect and judgment normal  Skin:  itchy rash on the arms, around the hair follicles   Neurological:  reflexes normal and symmetric, no resting tremor.      Lab Results  I reviewed prior lab results documented in EPIC.   TSH   Date Value Ref Range Status   12/04/2017 2.38 0.40 - 4.00 mU/L Final     T4 Free   Date Value Ref Range Status   08/24/2017 0.87 0.76 - 1.46 ng/dL Final     Lab Results   Component Value Date    A1C 5.4 08/08/2016     Assessment     1. Hashimoto thyroiditis  Clinically, the patient does not endorse definite signs or symptoms suggestive of hypothyroidism.  We are going to check the thyroid hormone levels today and adjust the dose of levothyroxine accordingly.    2.  Difficulty losing weight.  Her BMI is normal.  While I do not think this is an issue, discussed with the patient about the importance of establishing a regular exercise schedule, which might also help improve her energy level, joint pain and irritability.    3. Polyuria   This has been a chronic issue but it appears to be worsened since her last visit here.  The patient describes some urinary urgency and " a feeling of incompletely emptying the bladder.  Advised to follow-up with her primary care provider, as she may require an urological assessment.  I also advised to screen for diabetes by checking the A1c today.    Orders Placed This Encounter   Procedures     T4 free     TSH     Hemoglobin A1c     TSH     T4 free       Again, thank you for allowing me to participate in the care of your patient.      Sincerely,    Jayne Zapata MD

## 2018-07-02 NOTE — NURSING NOTE
Chief Complaint   Patient presents with     RECHECK     Hashimoto's thyroiditis f/u      Blanka rTipp, CMA

## 2018-07-02 NOTE — PROGRESS NOTES
Called patient. The thyroid hormone levels are normal. The A1c is in the prediabetic range. I recommend to schedule an apt with the dietician and have a fasting Bg checked with her next apt. The plan is to recheck the A1c in 4 months.

## 2018-07-18 DIAGNOSIS — E06.3 HASHIMOTO'S THYROIDITIS: ICD-10-CM

## 2018-07-19 RX ORDER — LEVOTHYROXINE SODIUM 75 UG/1
TABLET ORAL
Qty: 90 TABLET | Refills: 3 | OUTPATIENT
Start: 2018-07-19

## 2018-08-02 ENCOUNTER — OFFICE VISIT (OUTPATIENT)
Dept: NUTRITION | Facility: CLINIC | Age: 38
End: 2018-08-02
Payer: COMMERCIAL

## 2018-08-02 DIAGNOSIS — R73.03 PREDIABETES: Primary | ICD-10-CM

## 2018-08-02 PROCEDURE — 97802 MEDICAL NUTRITION INDIV IN: CPT

## 2018-08-02 NOTE — PROGRESS NOTES
Medical Nutrition Therapy  Visit Type:Initial assessment and intervention    Karen Beverly presents today for MNT and education related to prediabetes.   She is accompanied by self.     ASSESSMENT:   Patient comments/concerns relating to nutrition: Diagnosed with prediabetes, trying to eat less sugar and wondering what she's eating that isn't good for her. Stopped chocolate and sweets and candy; questions about sugar in fruits and yogurt; would like to review reading food labels to know what is good for blood sugars. Reports she was exercising in January and February, then stopped in March. Has now purchased [employer] for exercise; had thought that activity in caring for her children with never sitting down could have been enough, but realizes it isn't. Considering how to best lose a few more pounds. Breastfeeding her 16 month old, mostly at night, but hopes to be done completely by the time he's 2 years, if not sooner. Mom has high blood sugars and is taking medicine. Questions genetic component of high blood sugars, as well. Thinks that her age may also be a factor in her higher blood sugars.     NUTRITION HISTORY:    Breakfast: green salad, whole grain bread OR 1/2 cup oatmeal with almonds and dried fruits or fruit blended in OR 1/2 cup   Lunch: 2 slices multigrain baguette with vegetables and gouda; 1/2 jar of yogurt  Dinner: baked chicken, 2 small potatoes, green beans  Snacks: cucumbers, carrots or other vegetables Or 1 slice multigrain baguette with cheese and tomato; 7 hazelnuts  Beverages: Tea (Herbal) Coffee 1 with half and half/day, Water throughout the day, Mineral water 1/day and 12 oz cappuccino 0-1/day    Misses meals? no  Eats out:  seldom     Previous diet education:  No     Food allergies/intolerances: none reported    Diet is high in: n/a  Diet is low in: calcium    EXERCISE: minimal exercise, working to increase    SOCIO/ECONOMIC:   Lives with: spouse and 2 children, 5 years and 16  "months    MEDICATIONS:  Current Outpatient Prescriptions   Medication     hydrocortisone (ANUSOL-HC) 2.5 % cream     hydrocortisone (ANUSOL-HC) 25 MG Suppository     levothyroxine (SYNTHROID/LEVOTHROID) 75 MCG tablet     PRENATAL VITAMINS PO     No current facility-administered medications for this visit.        LABS:  Last Basic Metabolic Panel:  Lab Results   Component Value Date     04/02/2018      Lab Results   Component Value Date    POTASSIUM 3.6 04/02/2018     Lab Results   Component Value Date    CHLORIDE 108 04/02/2018     Lab Results   Component Value Date    TRAN 8.8 04/02/2018     Lab Results   Component Value Date    CO2 26 04/02/2018     Lab Results   Component Value Date    BUN 12 04/02/2018     Lab Results   Component Value Date    CR 0.55 04/02/2018     Lab Results   Component Value Date     04/02/2018       ANTHROPOMETRICS:  Vitals:   Estimated body mass index is 23.67 kg/(m^2) as calculated from the following:    Height as of 7/2/18: 1.626 m (5' 4\").    Weight as of 7/2/18: 62.6 kg (137 lb 14.4 oz).     Wt Readings from Last 5 Encounters:   07/02/18 62.6 kg (137 lb 14.4 oz)   05/17/18 62.2 kg (137 lb 1.6 oz)   04/02/18 62.7 kg (138 lb 3.2 oz)   03/19/18 61.7 kg (136 lb 1.6 oz)   12/13/17 62.9 kg (138 lb 11.2 oz)     Lab Results   Component Value Date    A1C 5.9 07/02/2018    A1C 5.4 08/08/2016     Weight Change: stable    ESTIMATED KCAL REQUIREMENTS:  1840 kcal per day with breastfeeding 1-3 times/day    NUTRITION DIAGNOSIS: Impaired nutrient utilization related to prediabetes as evidenced by A1c and fasting glucose in the prediabetes range.     NUTRITION INTERVENTION:  Nutrition Prescription: Energy Intake: 1500 kcal/day  Carbohydrate Intake: 30-45 grams/meal and 0-20 grams/snacks  Saturated Fat Intake: <15 grams/day  Education given to support: general nutrition guidelines, weight reduction, consistent meals, carb counting, exercise, portion control and heart healthy diet  Education " Materials Provided: My Plate Planner/Choose My Plate, Label Reading and Carbohydrate Counting    PATIENT'S BEHAVIOR CHANGE GOALS:   See Patient Instructions for patient stated behavior change goals. AVS was printed and given to patient at today's appointment.    MONITOR / EVALUATE:  RD will monitor/evaluate:  Beliefs and attitudes related to food  Blood Glucose / A1c  Food and nutrition knowledge / skills  Food / Beverage / Nutrient intake   Progress toward meeting stated nutrition-related goals  Readiness to change nutrition-related behaviors  Weight change    FOLLOW-UP:  Follow up with RD as needed.  Call RD with questions/concerns.     Ame Saleh, MPH, RD, LD, CDE   Time spent in minutes: 60A  Encounter: Individual

## 2018-08-02 NOTE — PATIENT INSTRUCTIONS
Aim for 30-45 grams of carbohydrate at each meal, 0-20 grams of carbohydrate at snacks    Aim for at least 150 minutes of exercise activity (above and beyond regular daily activity with kids)    Recipes - American Diabetes Association: https://www.diabetesfoodhub.org/     Quick meal cookbook options: http://www.BISON.org/Categories/11-Diabetes-Cookbooks-And-Recipes-Quick-And-Easy.aspx/1/14333532%5e43     https://recipes.heart.org/    Prediabetes: A Complete Guide  Елена Callahan, MS, RDN, CDE, CHWC, FAND  A comprehensive guide to help reduce the risk of developing type 2 diabetes and other lifestyle-related chronic diseases.

## 2018-08-02 NOTE — MR AVS SNAPSHOT
After Visit Summary   8/2/2018    Karen Beverly    MRN: [id number]           Patient Information     Date Of Birth          1980        Visit Information        Provider Department      8/2/2018 8:30 AM CP NUTRITION RESOURCE [facility]        Care Instructions    Aim for 30-45 grams of carbohydrate at each meal, 0-20 grams of carbohydrate at snacks    Aim for at least 150 minutes of exercise activity (above and beyond regular daily activity with kids)    Recipes - American Diabetes Association: https://www.diabetesfoodhub.org/     Quick meal cookbook options: http://www.[employer] Online.org/Categories/11-Diabetes-Cookbooks-And-Recipes-Quick-And-Easy.aspx/1/68599886%5e43     https://recipes.heart.org/    Prediabetes: A Complete Guide  Елена Callahan, MS, RDN, CDE, CHWC, FAND  A comprehensive guide to help reduce the risk of developing type 2 diabetes and other lifestyle-related chronic diseases.               Follow-ups after your visit        Your next 10 appointments already scheduled     Feb 18, 2019 10:30 AM CST   (Arrive by 10:15 AM)   PHYSICAL with Lars Burt MD   [facility] ([facility])    [address] [place] [id number] [phone]              Who to contact     If you have questions or need follow up information about today's clinic visit or your schedule please contact [facility] directly at [phone].  Normal or non-critical lab and imaging results will be communicated to you by MyChart, letter or phone within 4 business days after the clinic has received the results. If you do not hear from us within 7 days, please contact the clinic through [employer] or phone. If you have a critical or abnormal lab result, we will notify you by phone as soon as possible.  Submit refill requests through [employer] or call your pharmacy and they will forward the refill request  to us. Please allow 3 business days for your refill to be completed.          Additional Information About Your Visit        MyChart Information     [employer] gives you secure access to your electronic health record. If you see a primary care provider, you can also send messages to your care team and make appointments. If you have questions, please call your primary care clinic.  If you do not have a primary care provider, please call [phone] and they will assist you.        Care EveryWhere ID     This is your Care EveryWhere ID. This could be used by other organizations to access your [place] medical records  [id number]         Blood Pressure from Last 3 Encounters:   07/02/18 97/61   05/17/18 106/69   04/02/18 100/63    Weight from Last 3 Encounters:   07/02/18 62.6 kg (137 lb 14.4 oz)   05/17/18 62.2 kg (137 lb 1.6 oz)   04/02/18 62.7 kg (138 lb 3.2 oz)              Today, you had the following     No orders found for display       Primary Care Provider Office Phone # Fax #    Lars Burt [phone]       [address] [facility] [address]        Equal Access to Services     CAESAR HARDING : Hadii jose m munizo Sobhargavi, waaxda luqadaha, qaybta kaalmada adeegyada, maria isabel brewer. So [facility] [phone].    ATENCIÓN: Si habla español, tiene a christiansen disposición servicios gratuitos de asistencia lingüística. [facility] [phone].    We comply with applicable federal civil rights laws and [place] laws. We do not discriminate on the basis of race, color, national origin, age, disability, sex, sexual orientation, or gender identity.            Thank you!     Thank you for choosing [facility]  for your care. Our goal is always to provide you with excellent care. Hearing back from our patients is one way we can continue to improve our services. Please take a few minutes to complete the written survey that you may receive in the mail after  your visit with us. Thank you!             Your Updated Medication List - Protect others around you: Learn how to safely use, store and throw away your medicines at www.disposemymeds.org.          This list is accurate as of 8/2/18  9:36 AM.  Always use your most recent med list.                   Brand Name Dispense Instructions for use Diagnosis    hydrocortisone 2.5 % cream    ANUSOL-HC    30 g    Place rectally 2 times daily as needed for hemorrhoids    Other hemorrhoids       hydrocortisone 25 MG Suppository    ANUSOL-HC    30 suppository    Place 1 suppository (25 mg) rectally 2 times daily as needed for hemorrhoids    Other hemorrhoids       levothyroxine 75 MCG tablet    SYNTHROID/LEVOTHROID    90 tablet    Take 1 tablet (75 mcg) by mouth daily    Hashimoto's thyroiditis       PRENATAL VITAMINS PO      Take 1 tablet by mouth daily.

## 2018-10-02 ENCOUNTER — OFFICE VISIT (OUTPATIENT)
Dept: INTERNAL MEDICINE | Facility: CLINIC | Age: 38
End: 2018-10-02
Payer: COMMERCIAL

## 2018-10-02 VITALS — HEART RATE: 84 BPM | SYSTOLIC BLOOD PRESSURE: 97 MMHG | DIASTOLIC BLOOD PRESSURE: 61 MMHG

## 2018-10-02 DIAGNOSIS — R21 RASH AND NONSPECIFIC SKIN ERUPTION: Primary | ICD-10-CM

## 2018-10-02 DIAGNOSIS — Z86.39 HISTORY OF ELEVATED GLUCOSE: ICD-10-CM

## 2018-10-02 ASSESSMENT — PAIN SCALES - GENERAL: PAINLEVEL: NO PAIN (1)

## 2018-10-02 NOTE — PROGRESS NOTES
PRIMARY CARE CENTER       SUBJECTIVE:  Karen Beverly is a 38 year old female with a PMHx of Hashimoto's thyroiditis (on levothyroxine) who comes in for complaints of right-sided breast pain and complaints of skin rash.     Pt's breast pain has been ongoing since she has been breast feeding. She has 2 children, a 6-year-old girl and a 1.5-year-old boy. She had an episode of mastitis several years ago with her girl. She is currently breastfeeding. She does not believe she has mastitis currently. She denies breast redness or swelling, nipple or skin changes, breast masses or cystic lesions. She feels that the pain is associated with mild production and/or breastfeeding.     She also complains of rash involving her neck, behind the ears, the eyelids, and under the right axilla. She describes this rash as scaly, dry, itchy, and slightly painful. She feels that this rash is different from the rash on her upper arms, which has a similar appearance, and which she was previously told was related to her Hashimoto's. This new rash appeared in March of this year. Neck rash possibly associated with having her hair dyed in salon, has persisted since then. She was previously treated for this rash with clotrimazole and hydrocortisone with no improvement.     Pt is otherwise doing well, other than being tired from taking care of her kids. Lives at home with her  and 2 children whom she cares for full time. She recently had an HBA1c that was elevated (5.9 on 7/2/18). This was distressing to her, and she is worried that she may be becoming diabetic. Since then she has met with a dietician, cut out refined sugars from her diet, and incorporated more exercise into her daily routine. She is from [place] and has struggled a bit with not having family close by, but says that she has a supportive community of friends and neighbors.     Medications and allergies reviewed by me today.     ROS:   Constitutional,  neuro, ENT, endocrine, pulmonary, cardiac, gastrointestinal, genitourinary, musculoskeletal, integument and psychiatric systems are negative, except as otherwise noted.    OBJECTIVE:    BP 97/61  Pulse 84  Wt (P) 61.6 kg (135 lb 12.8 oz)  BMI (P) 23.31 kg/m2   Wt Readings from Last 1 Encounters:   10/02/18 (P) 61.6 kg (135 lb 12.8 oz)       GENERAL APPEARANCE: healthy, alert and no distress     EYES: EOMI, PERRL     HENT: ear canals and TM's normal and nose and mouth without ulcers or lesions     NECK: no adenopathy, no asymmetry, masses, or scars and thyroid normal to palpation     RESP: lungs clear to auscultation - no rales, rhonchi or wheezes     BREAST: normal without masses, tenderness or nipple discharge and no palpable axillary masses or adenopathy. No evidence of nipple injury, nipple vasoconstriction, engorgement, plugged ducts, nipple / breast infections, nipple dermatitis/psoriasis     CV: regular rates and rhythm, normal S1 S2, no S3 or S4 and no murmur, click or rub     ABDOMEN:  soft, nontender, no HSM or masses and bowel sounds normal     MS: extremities normal- no gross deformities noted, no evidence of inflammation in joints, FROM in all extremities.     SKIN: + areas of dry scaly skin on bilateral upper arms, nape of neck, behind left ear, under right arm. Mild excoriation.      NEURO: Normal strength and tone, sensory exam grossly normal, mentation intact and speech normal     PSYCH: mentation appears normal. and affect normal/bright     LYMPHATICS: No cervical adenopathy     ASSESSMENT/PLAN:    Karen was seen today for skin rash and breast pain.    Diagnoses and all orders for this visit:    # Rash  Most consistent with atopic dermatitis. Severity is moderate, with areas of dry skin, frequent itching, redness (with mild excoriation) and moderate impact on everyday activities and psychosocial wellbeing, frequently disturbed sleep. Has persisted despite topical corticosteroids- for this reason,  may benefit from referral to dermatology. Was educated on elimination of exacerbating factors (changing contact allergens), maintaining skin hydration, and controlling pruritis.   -     DERMATOLOGY REFERRAL    # History of elevated glucose  -     Recheck fasting glucose today  -     Recheck HbA1c in 1 year  -     Continue healthy diet and exercise    # Beast pain  No evidence of engorgement, plugged ducts, nipple infection, nipple dermatitis/psoriasis, engorgement, masses, abscesses, tenderness, or areas of erythema indicating mastitis. Patient reports no problems with latch. Patient educated on symptomatic management, warm compresses, and prevention of engorgement due to excess milk. If pain continues, patient will return to clinic, or may consult a lactation specialist.       Pt should return to clinic for f/u with me in 1 year or sooner if symptoms persist.     Esperanza De Anda MD MPH  Intern, Internal Medicine  Oct 2, 2018    Pt was seen and plan of care discussed with Dr. NANCY Tobias MD.     Teaching Physician Note:  I was present during the visit and the patient was seen and examined by me.   I discussed the case with the resident and agree with the note as documented by the resident with the following exceptions:  None.    Mandi Tobias M.D.  Internal Medicine   pager [phone]

## 2018-10-02 NOTE — NURSING NOTE
Chief Complaint   Patient presents with     Derm Problem     pt states having a burning and itching rash behind both ears and neck, left eyelid and left armpit     Breast Pain     pt states having  pain in right breast       Ana Paula Pardo CMA at 12:57 PM on 10/2/2018.

## 2018-10-02 NOTE — PATIENT INSTRUCTIONS
"[facility] Medication Refill Request Information:  * Please contact your pharmacy regarding ANY request for medication refills.  ** [facility] Prescription Fax = [phone]  * Please allow 3 business days for routine medication refills.  * Please allow 5 business days for controlled substance medication refills.     [facility] Test Result notification information:  *You will be notified with in 7-10 days of your appointment day regarding the results of your test.  If you are on MyChart you will be notified as soon as the provider has reviewed the results and signed off on them.    [facility]: [phone]       General Recommendations for your skin care:  Most areas of \"sensitive skin\"  will improve with gentle skin care and avoiding irritating substances. Follow these general guidelines:  Avoid scrubbing your skin. Do not use washcloths.  Use as little soap as possible. Use gentle cleansers (such as Cetaphil Gentle Liquid Cleanser) instead.   Do not use underwear liners/pads that have perfumes or dyes.  Use \"perfume free, dye free\" products and products labelled \"sensitive skin\" formula.  Consider using an extra rinse setting on your washing machine  Avoid applying cosmetic lotions or ointments directly on the affected skin.   Use moisturizers, creams that are for sensitive skin.  Some common brands include Cetaphil cream, Lubriderm, Moisturel, Vanicream, Pat. Plain Vaseline is also less likely to contribute to irritation.  Use warm (not hot) water for cleaning. Pat dry, don't rub.   Eliminate any newly added cosmetics or lotions.   Leave the affected area exposed to the air as much as possible.   Try calamine medicated lotion for poison ivy, oak, or sumac as well as other types of contact dermatitis.               [facility]         Internal Medicine Resident                   Continuity Clinic    Who We Are    Resident Continuity Clinic is a part of the [facility] Primary Care " Clinic.  Resident physicians see patients independently and establish a relationship with them over the course of their three-year residency program.  As with the Primary Care Clinic, our Resident Continuity Clinic models a group practice.  If your doctor is not available, you will be able to see another resident physician.  At the end of a resident s training, patients will be transitioned to a new resident physician for ongoing care.     We treat patients with a wide array of medical needs from routine physicals, to acute illnesses, to diabetes and blood pressure management, to complex medical illness.  What is a Resident Physician?    Resident physicians hold medical degrees and are doctors. They are training to become specialists in Internal Medicine. They work under the supervision of board-certified faculty physicians.  Expectations for Your Care    We strive to provide accessible, quality care at all times.    In order to provide this care, it is best to see your primary care resident doctor consistently rather switching between providers.  In the event you do see another physician, you should schedule a follow-up visit with your usual primary care doctor.    If you are transitioning your care from another clinic, it is helpful to have your records available for your doctor to review.    We do not prescribe controlled substances, such as ADD medications or narcotic pain medications, on your first visit.  We will review your health records and concerns prior to devising a treatment plan with you in order to provide the best care.      Clinic Services     Extended clinic hours; patient  to help navigate your visit;  parking; laboratory and imaging services with evening and weekend hours    Multiple medical and surgical specialties in one [facility]    Complementary services, including Nutrition, Integrative Medicine, Pharmacy consultations, Mental and Behavioral Health, Sports Medicine and Physical  Therapy    Thank You    We would like to thank you for choosing the [facility] Internal Medicine Resident Continuity Clinic for your primary care. You are making a priceless contribution to the training of the next generation of health care practitioners.     Contact us at [phone] for appointments or questions.    Resident Clinic Hours are Tuesdays and Thursdays, 7:30am-5:00pm    Residents  Viridiana Swanson MD   (Female )   María Davila MD   (Female)   Ehsan Lockwood MD  (Male)   Phu Pettit MD  (Male)   Maninder Solitario MD   (Female)   Jayy Mancuso MD  (Male)    Milan Ramirez MD  (Male)   Lorenzo Jenkins MD  (Male)   Phu Burgos MD (Male)   Gokul Schilling MD  (Male)   Laurita Sotelo MD (Female)    Velia Basurto MD (Female)   Dillon Murdock MD  (Male)   Carmencita Romeo MD(Female)   Alida Velez MD  (Female)    Supervising Physicians   MD Елена Gordillo MD Briar Duffy, MD James Langland, MD Mary Logeais, MD Tanya Melnik, MD Charles Moldow, MD Heather Thompson Buum, MD Kathleen Watson, MD

## 2018-10-02 NOTE — MR AVS SNAPSHOT
"              After Visit Summary   10/2/2018    Karen Beverly    MRN: [id number]           Patient Information     Date Of Birth          1980        Visit Information        Provider Department      10/2/2018 12:45 PM Esperanza De Anda MD [facility]        Today's Diagnoses     Rash and nonspecific skin eruption    -  1    History of elevated glucose          Care Instructions    Primary Care Center Medication Refill Request Information:  * Please contact your pharmacy regarding ANY request for medication refills.  ** [facility] Prescription Fax = [phone]  * Please allow 3 business days for routine medication refills.  * Please allow 5 business days for controlled substance medication refills.     Primary Care Center Test Result notification information:  *You will be notified with in 7-10 days of your appointment day regarding the results of your test.  If you are on MyChart you will be notified as soon as the provider has reviewed the results and signed off on them.    [facility]: [phone]       General Recommendations for your skin care:  Most areas of \"sensitive skin\"  will improve with gentle skin care and avoiding irritating substances. Follow these general guidelines:  Avoid scrubbing your skin. Do not use washcloths.  Use as little soap as possible. Use gentle cleansers (such as Cetaphil Gentle Liquid Cleanser) instead.   Do not use underwear liners/pads that have perfumes or dyes.  Use \"perfume free, dye free\" products and products labelled \"sensitive skin\" formula.  Consider using an extra rinse setting on your washing machine  Avoid applying cosmetic lotions or ointments directly on the affected skin.   Use moisturizers, creams that are for sensitive skin.  Some common brands include Cetaphil cream, Lubriderm, Moisturel, Vanicream, Pat. Plain Vaseline is also less likely to contribute to irritation.  Use warm (not hot) water for cleaning. Pat dry, don't " rub.   Eliminate any newly added cosmetics or lotions.   Leave the affected area exposed to the air as much as possible.   Try calamine medicated lotion for poison ivy, oak, or sumac as well as other types of contact dermatitis.               [facility]         Internal Medicine Resident                   Continuity Clinic    Who We Are    Resident Continuity Clinic is a part of the [facility].  Resident physicians see patients independently and establish a relationship with them over the course of their three-year residency program.  As with the Primary Care Clinic, our Resident Continuity Clinic models a group practice.  If your doctor is not available, you will be able to see another resident physician.  At the end of a resident s training, patients will be transitioned to a new resident physician for ongoing care.     We treat patients with a wide array of medical needs from routine physicals, to acute illnesses, to diabetes and blood pressure management, to complex medical illness.  What is a Resident Physician?    Resident physicians hold medical degrees and are doctors. They are training to become specialists in Internal Medicine. They work under the supervision of board-certified faculty physicians.  Expectations for Your Care    We strive to provide accessible, quality care at all times.    In order to provide this care, it is best to see your primary care resident doctor consistently rather switching between providers.  In the event you do see another physician, you should schedule a follow-up visit with your usual primary care doctor.    If you are transitioning your care from another clinic, it is helpful to have your records available for your doctor to review.    We do not prescribe controlled substances, such as ADD medications or narcotic pain medications, on your first visit.  We will review your health records and concerns prior to devising a treatment plan with you  in order to provide the best care.      Clinic Services     Extended clinic hours; patient  to help navigate your visit;  parking; laboratory and imaging services with evening and weekend hours    Multiple medical and surgical specialties in one building    Complementary services, including Nutrition, Integrative Medicine, Pharmacy consultations, Mental and Behavioral Health, Sports Medicine and Physical Therapy    Thank You    We would like to thank you for choosing the [facility] Internal [facility] Resident Continuity Clinic for your primary care. You are making a priceless contribution to the training of the next generation of health care practitioners.     Contact us at [phone] for appointments or questions.    Resident Clinic Hours are Tuesdays and Thursdays, 7:30am-5:00pm    Residents  Viridiana Swanson MD   (Female )   María Davila MD   (Female)   Ehsan Lockwood MD  (Male)   Phu Pettit MD  (Male)   Maninder Solitario MD   (Female)   Jayy Mancuso MD  (Male)    Milan Ramirez MD  (Male)   Lorenzo Jenkins MD  (Male)   Phu Burgos MD (Male)   Gokul Schilling MD  (Male)   Laurita Sotelo MD (Female)    Velia Basurto MD (Female)   Dillon Murdock MD  (Male)   Carmencita Romeo MD(Female)   Alida Velez MD  (Female)    Supervising Physicians   MD Елена Gordillo MD Briar Duffy, MD James Langland, MD Mary Logeais, MD Tanya Melnik, MD Charles Moldow, MD Heather Thompson Buum, MD Kathleen Watson, MD                    Follow-ups after your visit        Additional Services     DERMATOLOGY REFERRAL       Your provider has referred you to: [facility]: Dermatology Clinic [facility] [phone]   [email or web address]    Please be aware that coverage of these services is subject to the terms and limitations of your health insurance plan.  Call member services at your health plan with any benefit or  coverage questions.      Please bring the following with you to your appointment:    (1) Any X-Rays, CTs or MRIs which have been performed.  Contact the facility where they were done to arrange for  prior to your scheduled appointment.    (2) List of current medications  (3) This referral request   (4) Any documents/labs given to you for this referral                  Follow-up notes from your care team     Return in about 1 year (around 10/2/2019), or or sooner if symptoms worsen or fail to improve, for Physical Exam.      Your next 10 appointments already scheduled     Feb 18, 2019 10:30 AM CST   (Arrive by 10:15 AM)   PHYSICAL with Lars Burt MD   [facility] ([facility])    [address]  4th Floor  [facility] [address] [phone]              Future tests that were ordered for you today     Open Future Orders        Priority Expected Expires Ordered    Glucose Routine 10/2/2018 10/2/2019 10/2/2018            Who to contact     Please call your clinic at [phone] to:    Ask questions about your health    Make or cancel appointments    Discuss your medicines    Learn about your test results    Speak to your doctor            Additional Information About Your Visit        Se[employer] Information     Se[employer]use gives you secure access to your electronic health record. If you see a primary care provider, you can also send messages to your care team and make appointments. If you have questions, please call your primary care clinic.  If you do not have a primary care provider, please call [phone] and they will assist you.      Seedfuse is an electronic gateway that provides easy, online access to your medical records. With [employer], you can request a clinic appointment, read your test results, renew a prescription or communicate with your care team.     To access your existing account, please contact your [facility]  Clinic or call [phone] for assistance.        Care EveryWhere ID     This is your Care EveryWhere ID. This could be used by other organizations to access your [place] medical records  [id number]        Your Vitals Were     Pulse                   84            Blood Pressure from Last 3 Encounters:   10/02/18 97/61   07/02/18 97/61   05/17/18 106/69    Weight from Last 3 Encounters:   10/02/18 (P) 61.6 kg (135 lb 12.8 oz)   07/02/18 62.6 kg (137 lb 14.4 oz)   05/17/18 62.2 kg (137 lb 1.6 oz)              We Performed the Following     DERMATOLOGY REFERRAL          Today's Medication Changes          These changes are accurate as of 10/2/18  2:22 PM.  If you have any questions, ask your nurse or doctor.               Stop taking these medicines if you haven't already. Please contact your care team if you have questions.     hydrocortisone 25 MG Suppository   Commonly known as:  ANUSOL-HC   Stopped by:  Esperanza De Anda MD                    Primary Care Provider Office Phone # Fax #    Lars Burt [phone] [address] [address]        Equal Access to Services     CAESAR HARDING AH: Maulik munizo Sobhargavi, waaxda luralph, qaybta kaalmada adetienda, maria isabel brewer. So [facility] [phone].    ATENCIÓN: Si habla español, tiene a christinasen disposición servicios gratuitos de asistencia lingüística. Llame al [phone].    We comply with applicable federal civil rights laws and [place] laws. We do not discriminate on the basis of race, color, national origin, age, disability, sex, sexual orientation, or gender identity.            Thank you!     Thank you for choosing [facility]IMARY [facility]ARE CLINIC  for your care. Our goal is always to provide you with excellent care. Hearing back from our patients is one way we can continue to improve our services. Please take a few minutes to complete the written survey that you may receive  in the mail after your visit with us. Thank you!             Your Updated Medication List - Protect others around you: Learn how to safely use, store and throw away your medicines at www.disposemymeds.org.          This list is accurate as of 10/2/18  2:22 PM.  Always use your most recent med list.                   Brand Name Dispense Instructions for use Diagnosis    hydrocortisone 2.5 % cream    ANUSOL-HC    30 g    Place rectally 2 times daily as needed for hemorrhoids    Other hemorrhoids       levothyroxine 75 MCG tablet    SYNTHROID/LEVOTHROID    90 tablet    Take 1 tablet (75 mcg) by mouth daily    Hashimoto's thyroiditis       PRENATAL VITAMINS PO      Take 1 tablet by mouth daily.

## 2018-10-24 ENCOUNTER — DOCUMENTATION ONLY (OUTPATIENT)
Dept: LAB | Facility: CLINIC | Age: 38
End: 2018-10-24

## 2018-10-25 DIAGNOSIS — R73.09 OTHER ABNORMAL GLUCOSE: ICD-10-CM

## 2018-10-25 DIAGNOSIS — E06.3 HASHIMOTO'S THYROIDITIS: ICD-10-CM

## 2018-10-25 LAB
GLUCOSE SERPL-MCNC: 89 MG/DL (ref 70–99)
HBA1C MFR BLD: 5.5 % (ref 0–5.6)
T4 FREE SERPL-MCNC: 1.03 NG/DL (ref 0.76–1.46)
TSH SERPL DL<=0.005 MIU/L-ACNC: 1.74 MU/L (ref 0.4–4)

## 2018-10-25 PROCEDURE — 84439 ASSAY OF FREE THYROXINE: CPT | Performed by: INTERNAL MEDICINE

## 2018-10-25 PROCEDURE — 82947 ASSAY GLUCOSE BLOOD QUANT: CPT | Performed by: INTERNAL MEDICINE

## 2018-10-25 PROCEDURE — 84443 ASSAY THYROID STIM HORMONE: CPT | Performed by: INTERNAL MEDICINE

## 2018-10-25 PROCEDURE — 83036 HEMOGLOBIN GLYCOSYLATED A1C: CPT | Performed by: INTERNAL MEDICINE

## 2018-10-25 PROCEDURE — 36415 COLL VENOUS BLD VENIPUNCTURE: CPT | Performed by: INTERNAL MEDICINE

## 2018-10-26 NOTE — PROGRESS NOTES
All lab results are normal (the thyroid hormone levels, the glucose level, and the A1c -which measures the average blood glucose over a period of 3 months).

## 2018-11-02 ENCOUNTER — OFFICE VISIT (OUTPATIENT)
Dept: DERMATOLOGY | Facility: CLINIC | Age: 38
End: 2018-11-02
Payer: COMMERCIAL

## 2018-11-02 DIAGNOSIS — L20.9 ATOPIC DERMATITIS, UNSPECIFIED TYPE: Primary | ICD-10-CM

## 2018-11-02 RX ORDER — TRIAMCINOLONE ACETONIDE 1 MG/G
OINTMENT TOPICAL 2 TIMES DAILY
Qty: 60 G | Refills: 0 | Status: ON HOLD | OUTPATIENT
Start: 2018-11-02 | End: 2021-02-05

## 2018-11-02 RX ORDER — HYDROCORTISONE VALERATE 2 MG/G
OINTMENT TOPICAL 2 TIMES DAILY
Qty: 15 G | Refills: 0 | Status: SHIPPED | OUTPATIENT
Start: 2018-11-02 | End: 2021-02-03

## 2018-11-02 ASSESSMENT — PAIN SCALES - GENERAL: PAINLEVEL: NO PAIN (0)

## 2018-11-02 NOTE — LETTER
11/2/2018       RE: Karen Beverly  [address]  [place] [address]     Dear Colleague,    Thank you for referring your patient, Karen Beverly, to the [facility] DERMATOLOGY at [facility]. Please see a copy of my visit note below.    [facility] Dermatology Note      Dermatology Problem List:  1. Atopic dermatitis:     Encounter Date: Nov 2, 2018    CC:   Chief Complaint   Patient presents with     Derm Problem     Karen is here for rash on her nack and ears.         History of Present Illness:  Ms. Karen Beverly is a 38 year old female who presents for evaluation of rash that she has had since March of this year. She feels that this all started after having had her hair cut short. She developed burning and redness behind ears, then back of neck, then to left eye lid, then left underarm. She says that this felt like burning rather than itching. She has tried hydrocortisone 2.5% cream, which caused burning and didn't seem to help. Has no previous history of eczema, only recently has seasonal allergies in the past two years after having moved to [place] 7 years ago, and has no history of asthma. She has never had a rash like this before.     Showers daily, uses ivory soap, uses dove shampoo.         Past Medical History:   Patient Active Problem List   Diagnosis     Pregnancy related condition     Elevated TSH     Thyroid disease, antepartum     History of multiple miscarriages     Hashimoto's thyroiditis     Indication for care in labor or delivery     Labor and delivery, indication for care     Rhinoconjunctivitis     Chronic sinusitis, unspecified location     Gastroesophageal reflux disease without esophagitis     Past Medical History:   Diagnosis Date     Eczema      Endometriosis      Thyroid disease      Past Surgical History:   Procedure Laterality Date     DAVINCI PELVIC PROCEDURE  2/22/2012    Procedure:DAVINCI PELVIC PROCEDURE; DAVINCI  DIAGNOSTIC PELVISCOPY WITH OMNIGUIDE C02 LASER, DIAGNOSTIC HYSTEROSCOPY, EXTENSIVE LYSIS EXCISION OF ENDOMETRIOSIS, BILATERAL URETEROLYSIS, D & C; Surgeon:JOHN KELLY; Location: OR       Social History:  Patient  reports that she has never smoked. She has never used smokeless tobacco. She reports that she does not drink alcohol or use illicit drugs.    Family History:  Family History   Problem Relation Age of Onset     Hypertension Mother      Cancer Other      liver cancer     Liver Cancer Maternal Grandmother 79     Hypertension Maternal Grandfather      HEART DISEASE Paternal Grandmother 62     HEART DISEASE Paternal Grandfather 60     Melanoma No family hx of      Skin Cancer No family hx of        Medications:  Current Outpatient Prescriptions   Medication Sig Dispense Refill     hydrocortisone (ANUSOL-HC) 2.5 % cream Place rectally 2 times daily as needed for hemorrhoids 30 g 1     levothyroxine (SYNTHROID/LEVOTHROID) 75 MCG tablet Take 1 tablet (75 mcg) by mouth daily 90 tablet 3     PRENATAL VITAMINS PO Take 1 tablet by mouth daily.            Allergies   Allergen Reactions     Augmentin Nausea and Vomiting     Latex Rash         Review of Systems:  -As per HPI  -Constitutional: The patient denies fatigue, fevers, chills, unintended weight loss, and night sweats.  -HEENT: Patient denies nonhealing oral sores.  -Skin: As above in HPI. No additional skin concerns.    Physical exam:  Vitals: There were no vitals taken for this visit.  GEN: This is a well developed, well-nourished female in no acute distress, in a pleasant mood.    SKIN: Focused examination of the scalp, face, lips, eyelids, neck, upper chest, upper back, abdomen, bilateral upper extremities was performed.  -there is poorly demarcated erythema of bilateral post-auricular scalp, posterior neck  -there are excoriated papules on the posterior neck  -posterior neck plaques also appear slightly indurated  -no findings on bilateral eye  lids  -No other lesions of concern on areas examined.     Impression/Plan:  1. Atopic dermatitis: ddx includes allergic contact dermatitis, but will treat empirically today for atopic dermatitis    Discussed dry skin care and handout provided    Stop using ivory soap    Start using Dove sensitive skin    Start using CeraVe cream daily for moisturizer, especially after daily shower    Start using triamcinolone 0.1% ointment to skin on neck and other body areas    Start using hydrocortisone valerate 0.2% ointment to eyelids, behind ears, and other areas of face or axilla if affected    Follow-up in 2 months, earlier for new or changing lesions.  If clinically appropriate, consider patch testing.      Dr. Zarco staffed the patient.    Staff Involved:  Resident(Avinash Bansal)/Staff(as above)    Avinash Bansal MD, PhD  Medicine-Dermatology PGY-3  I was present during the key portions of the history and physical exam. I verified the history and examined the patient  I agree with the treatment plan.      Kevin Zarco MD

## 2018-11-02 NOTE — PATIENT INSTRUCTIONS
- stop using Ivory soap  - start using Dove sensitive skin unscented bar soap  - start using CeraVe cream daily for moisturizer, use immediately after shower  - start using petroleum jelly (vaseline) for eyelid moisturizer  - start using triamcinolone 0.1% ointment to skin on neck  - start using hydrocortisone valerate 0.2% ointment to eyelids and behind ears    Dry Skin    What is dry skin?    Common skin problem    Can be worse during the winter     Affects all ages    Occurs in people with or without other skin problems    What does it look like?    Fine lines in the skin become more visible     Rough feeling skin     Flaky skin    Most common on the arms and legs    Skin can become cracked, especially on the hands and feet    What are some problems caused by dry skin?     Itching    Rubbing or scratching can cause thickened, rough skin patches    Cracks in skin can be painful    Red, itchy, scaly skin (called eczema) can occur    Yellow crusting or pus could be signs of an infection    What causes dry skin?    A lack of water in the top layer of the skin    Too much soapy water,  hot water, or harsh chemicals    Aging and sun damage    How do I treat dry skin?    Shower or bathe daily for under ten minutes with lukewarm water and mild soap.    Pat yourself dry with a towel gently and leave your skin slightly damp.    Use moisturizing cream or ointment right away.  Avoid lotions.    What kind of mild soap should I be using?    Camay , Dove , Tone , Neutrogena , Purpose , or Oil of Olay     A non-detergent cleanser, like Cetaphil , can be used.    What should I stay away from?    Scented soaps     Bath oils    What moisturizers should I be using?    Cetaphil Cream,CeraVe Cream, Vanicream, Aquaphilic, Eucerin, Aquaphor, or Vaseline     Always apply after showering or bathing.    Reapply throughout the day, if possible.    If dry skin affects your hands, always reapply after handwashing.    What else should I  know?    Using a humidifier during winter months may help.    If dry skin gets worse or if eczema develops, a steroid cream may be needed.

## 2018-11-02 NOTE — MR AVS SNAPSHOT
After Visit Summary   11/2/2018    Karen Beverly    MRN: [id number]           Patient Information     Date Of Birth          1980        Visit Information        Provider Department      11/2/2018 10:00 AM Kevin Zarco MD [facility]        Today's Diagnoses     Atopic dermatitis, unspecified type    -  1      Care Instructions    - stop using Ivory soap  - start using Dove sensitive skin unscented bar soap  - start using CeraVe cream daily for moisturizer, use immediately after shower  - start using petroleum jelly (vaseline) for eyelid moisturizer  - start using triamcinolone 0.1% ointment to skin on neck  - start using hydrocortisone valerate 0.2% ointment to eyelids and behind ears    Dry Skin    What is dry skin?    Common skin problem    Can be worse during the winter     Affects all ages    Occurs in people with or without other skin problems    What does it look like?    Fine lines in the skin become more visible     Rough feeling skin     Flaky skin    Most common on the arms and legs    Skin can become cracked, especially on the hands and feet    What are some problems caused by dry skin?     Itching    Rubbing or scratching can cause thickened, rough skin patches    Cracks in skin can be painful    Red, itchy, scaly skin (called eczema) can occur    Yellow crusting or pus could be signs of an infection    What causes dry skin?    A lack of water in the top layer of the skin    Too much soapy water,  hot water, or harsh chemicals    Aging and sun damage    How do I treat dry skin?    Shower or bathe daily for under ten minutes with lukewarm water and mild soap.    Pat yourself dry with a towel gently and leave your skin slightly damp.    Use moisturizing cream or ointment right away.  Avoid lotions.    What kind of mild soap should I be using?    Camay , Dove , Tone , Neutrogena , Purpose , or Oil of Olay     A non-detergent cleanser, like Cetaphil , can be used.    What  should I stay away from?    Scented soaps     Bath oils    What moisturizers should I be using?    Cetaphil Cream,CeraVe Cream, Vanicream, Aquaphilic, Eucerin, Aquaphor, or Vaseline     Always apply after showering or bathing.    Reapply throughout the day, if possible.    If dry skin affects your hands, always reapply after handwashing.    What else should I know?    Using a humidifier during winter months may help.    If dry skin gets worse or if eczema develops, a steroid cream may be needed.            Follow-ups after your visit        Follow-up notes from your care team     Return in about 2 months (around 1/2/2019).      Your next 10 appointments already scheduled     Jan 04, 2019 10:15 AM CST   (Arrive by 10:00 AM)   Return Visit with Kevin Zarco MD   [facility] ([facility])    [address] [place] [id number] [phone]            Feb 18, 2019 10:30 AM CST   (Arrive by 10:15 AM)   PHYSICAL with Lars Burt MD   [facility] ([facility])    [address] [place] [id number] [phone]              Who to contact     Please call your clinic at [phone] to:    Ask questions about your health    Make or cancel appointments    Discuss your medicines    Learn about your test results    Speak to your doctor            Additional Information About Your Visit        [employer] [facility] Information     [employer] gives you secure access to your electronic health record. If you see a primary care provider, you can also send messages to your care team and make appointments. If you have questions, please call your primary care clinic.  If you do not have a primary care provider, please call [phone] and they will assist you.      [employer] is an electronic gateway that provides easy, online access to your medical records. With [employer] [facility], you can request a clinic appointment, read your  test results, renew a prescription or communicate with your care team.     To access your existing account, please contact your [facility] or call [phone] for assistance.        Care EveryWhere ID     This is your Care EveryWhere ID. This could be used by other organizations to access your [facility] medical records  [id number]         Blood Pressure from Last 3 Encounters:   10/02/18 97/61   07/02/18 97/61   05/17/18 106/69    Weight from Last 3 Encounters:   10/02/18 (P) 61.6 kg (135 lb 12.8 oz)   07/02/18 62.6 kg (137 lb 14.4 oz)   05/17/18 62.2 kg (137 lb 1.6 oz)              Today, you had the following     No orders found for display         Today's Medication Changes          These changes are accurate as of 11/2/18 10:30 AM.  If you have any questions, ask your nurse or doctor.               Start taking these medicines.        Dose/Directions    hydrocortisone valerate 0.2 % ointment   Commonly known as:  WEST-BELKYS   Used for:  Atopic dermatitis, unspecified type   Started by:  Kevin Zarco MD        Apply topically 2 times daily Behind ears, eyelids, and underarms   Quantity:  15 g   Refills:  0       triamcinolone 0.1 % ointment   Commonly known as:  KENALOG   Used for:  Atopic dermatitis, unspecified type   Started by:  Kevin Zarco MD        Apply topically 2 times daily Avoid using on face.   Quantity:  60 g   Refills:  0            Where to get your medicines      These medications were sent to [id number] [place] - [address]  [address] [place] [address]     Phone:  [phone]     hydrocortisone valerate 0.2 % ointment    triamcinolone 0.1 % ointment                Primary Care Provider Office Phone # Fax #    Lars Burt [phone]       [address] [address]        Equal Access to Services     DEVANTE HARDING AH: Hadrandy Zacarias, zay hernandez, max martin  maria isabel albertrosemarie rowland'aan ah. So [facility] [phone].    ATENCIÓN: Si ckla aishwarya, tiene a christiansen disposición servicios gratuitos de asistencia lingüística. Saqib al [phone].    We comply with applicable federal civil rights laws and [place] laws. We do not discriminate on the basis of race, color, national origin, age, disability, sex, sexual orientation, or gender identity.            Thank you!     Thank you for choosing [facility]  for your care. Our goal is always to provide you with excellent care. Hearing back from our patients is one way we can continue to improve our services. Please take a few minutes to complete the written survey that you may receive in the mail after your visit with us. Thank you!             Your Updated Medication List - Protect others around you: Learn how to safely use, store and throw away your medicines at www.disposemymeds.org.          This list is accurate as of 11/2/18 10:30 AM.  Always use your most recent med list.                   Brand Name Dispense Instructions for use Diagnosis    hydrocortisone 2.5 % cream    ANUSOL-HC    30 g    Place rectally 2 times daily as needed for hemorrhoids    Other hemorrhoids       hydrocortisone valerate 0.2 % ointment    WEST-BELKYS    15 g    Apply topically 2 times daily Behind ears, eyelids, and underarms    Atopic dermatitis, unspecified type       levothyroxine 75 MCG tablet    SYNTHROID/LEVOTHROID    90 tablet    Take 1 tablet (75 mcg) by mouth daily    Hashimoto's thyroiditis       PRENATAL VITAMINS PO      Take 1 tablet by mouth daily.        triamcinolone 0.1 % ointment    KENALOG    60 g    Apply topically 2 times daily Avoid using on face.    Atopic dermatitis, unspecified type

## 2018-11-02 NOTE — NURSING NOTE
Dermatology Rooming Note    Karen Beverly's goals for this visit include:   Chief Complaint   Patient presents with     Derm Problem     Karen is here for rash.     Jaci Gutierrez, CMA

## 2018-11-02 NOTE — PROGRESS NOTES
[facility] Dermatology Note      Dermatology Problem List:  1. Atopic dermatitis:     Encounter Date: Nov 2, 2018    CC:   Chief Complaint   Patient presents with     Derm Problem     Karen is here for rash on her nack and ears.         History of Present Illness:  Ms. Karen Beverly is a 38 year old female who presents for evaluation of rash that she has had since March of this year. She feels that this all started after having had her hair cut short. She developed burning and redness behind ears, then back of neck, then to left eye lid, then left underarm. She says that this felt like burning rather than itching. She has tried hydrocortisone 2.5% cream, which caused burning and didn't seem to help. Has no previous history of eczema, only recently has seasonal allergies in the past two years after having moved to [place] 7 years ago, and has no history of asthma. She has never had a rash like this before.     Showers daily, uses ivory soap, uses dove shampoo.         Past Medical History:   Patient Active Problem List   Diagnosis     Pregnancy related condition     Elevated TSH     Thyroid disease, antepartum     History of multiple miscarriages     Hashimoto's thyroiditis     Indication for care in labor or delivery     Labor and delivery, indication for care     Rhinoconjunctivitis     Chronic sinusitis, unspecified location     Gastroesophageal reflux disease without esophagitis     Past Medical History:   Diagnosis Date     Eczema      Endometriosis      Thyroid disease      Past Surgical History:   Procedure Laterality Date     DAVINCI PELVIC PROCEDURE  2/22/2012    Procedure:DAVINCI PELVIC PROCEDURE; DAVINCI DIAGNOSTIC PELVISCOPY WITH OMNIGUIDE C02 LASER, DIAGNOSTIC HYSTEROSCOPY, EXTENSIVE LYSIS EXCISION OF ENDOMETRIOSIS, BILATERAL URETEROLYSIS, D & C; Surgeon:JOHN KELLY; Location: OR       Social History:  Patient  reports that she has never smoked. She has never used  smokeless tobacco. She reports that she does not drink alcohol or use illicit drugs.    Family History:  Family History   Problem Relation Age of Onset     Hypertension Mother      Cancer Other      liver cancer     Liver Cancer Maternal Grandmother 79     Hypertension Maternal Grandfather      HEART DISEASE Paternal Grandmother 62     HEART DISEASE Paternal Grandfather 60     Melanoma No family hx of      Skin Cancer No family hx of        Medications:  Current Outpatient Prescriptions   Medication Sig Dispense Refill     hydrocortisone (ANUSOL-HC) 2.5 % cream Place rectally 2 times daily as needed for hemorrhoids 30 g 1     levothyroxine (SYNTHROID/LEVOTHROID) 75 MCG tablet Take 1 tablet (75 mcg) by mouth daily 90 tablet 3     PRENATAL VITAMINS PO Take 1 tablet by mouth daily.            Allergies   Allergen Reactions     Augmentin Nausea and Vomiting     Latex Rash         Review of Systems:  -As per HPI  -Constitutional: The patient denies fatigue, fevers, chills, unintended weight loss, and night sweats.  -HEENT: Patient denies nonhealing oral sores.  -Skin: As above in HPI. No additional skin concerns.    Physical exam:  Vitals: There were no vitals taken for this visit.  GEN: This is a well developed, well-nourished female in no acute distress, in a pleasant mood.    SKIN: Focused examination of the scalp, face, lips, eyelids, neck, upper chest, upper back, abdomen, bilateral upper extremities was performed.  -there is poorly demarcated erythema of bilateral post-auricular scalp, posterior neck  -there are excoriated papules on the posterior neck  -posterior neck plaques also appear slightly indurated  -no findings on bilateral eye lids  -No other lesions of concern on areas examined.     Impression/Plan:  1. Atopic dermatitis: ddx includes allergic contact dermatitis, but will treat empirically today for atopic dermatitis    Discussed dry skin care and handout provided    Stop using ivory soap    Start using  Dove sensitive skin    Start using CeraVe cream daily for moisturizer, especially after daily shower    Start using triamcinolone 0.1% ointment to skin on neck and other body areas    Start using hydrocortisone valerate 0.2% ointment to eyelids, behind ears, and other areas of face or axilla if affected    Follow-up in 2 months, earlier for new or changing lesions.  If clinically appropriate, consider patch testing.      Dr. Zarco staffed the patient.    Staff Involved:  Resident(Avinash Bansal)/Staff(as above)    Avinash Bansal MD, PhD  Medicine-Dermatology PGY-3  I was present during the key portions of the history and physical exam. I verified the history and examined the patient  I agree with the treatment plan. NICOLE Zarco MD

## 2018-12-20 ENCOUNTER — HOSPITAL ENCOUNTER (OUTPATIENT)
Dept: NUCLEAR MEDICINE | Facility: CLINIC | Age: 38
Setting detail: NUCLEAR MEDICINE
Discharge: HOME | End: 2018-12-20
Attending: FAMILY MEDICINE | Admitting: FAMILY MEDICINE
Payer: COMMERCIAL

## 2018-12-20 DIAGNOSIS — R10.11 ABDOMINAL PAIN, RIGHT UPPER QUADRANT: ICD-10-CM

## 2018-12-20 DIAGNOSIS — D18.03 HEMANGIOMA OF LIVER: ICD-10-CM

## 2018-12-20 PROCEDURE — 78227 HEPATOBIL SYST IMAGE W/DRUG: CPT

## 2018-12-20 PROCEDURE — A9537 TC99M MEBROFENIN: HCPCS | Performed by: FAMILY MEDICINE

## 2018-12-20 PROCEDURE — 34300033 ZZH RX 343: Performed by: FAMILY MEDICINE

## 2018-12-20 RX ORDER — KIT FOR THE PREPARATION OF TECHNETIUM TC 99M MEBROFENIN 45 MG/10ML
4.8-7.2 INJECTION, POWDER, LYOPHILIZED, FOR SOLUTION INTRAVENOUS ONCE
Status: COMPLETED | OUTPATIENT
Start: 2018-12-20 | End: 2018-12-20

## 2018-12-20 RX ADMIN — MEBROFENIN 5.5 MCI.: 45 INJECTION, POWDER, LYOPHILIZED, FOR SOLUTION INTRAVENOUS at 10:15

## 2018-12-23 ENCOUNTER — TELEPHONE (OUTPATIENT)
Dept: FAMILY MEDICINE | Facility: CLINIC | Age: 38
End: 2018-12-23

## 2018-12-23 DIAGNOSIS — K82.8 NON-FUNCTIONING GALLBLADDER: Primary | ICD-10-CM

## 2018-12-23 NOTE — TELEPHONE ENCOUNTER
Impression:     1. No evidence of cystic duct obstruction.  2. Decreased gallbladder ejection fraction suggesting functional gall  bladder disorder.    December 23, 2018  Results of the [place] Hepatobiliary scan show functional impairment of the gallbladder.   Surgical consult placed to discuss options (they may recommend gallbladder removal).  Please call her to discuss results.  Best wishes,  Lars Burt MD

## 2018-12-24 NOTE — TELEPHONE ENCOUNTER
Voice message was left for patient with scheduling number for surgical referral, and also relaying Dr. Burt's message.    Nathalie Crowell RN on 12/24/2018 at 10:29 AM

## 2019-02-12 ASSESSMENT — ENCOUNTER SYMPTOMS
CONSTIPATION: 0
DIARRHEA: 0
HEARTBURN: 1
RECTAL PAIN: 0
ABDOMINAL PAIN: 1
JAUNDICE: 0
VOMITING: 0
SKIN CHANGES: 0
BLOATING: 0
BLOOD IN STOOL: 0
POOR WOUND HEALING: 0

## 2019-02-18 ENCOUNTER — OFFICE VISIT (OUTPATIENT)
Dept: DERMATOLOGY | Facility: CLINIC | Age: 39
End: 2019-02-18
Payer: COMMERCIAL

## 2019-02-18 ENCOUNTER — TELEPHONE (OUTPATIENT)
Dept: DERMATOLOGY | Facility: CLINIC | Age: 39
End: 2019-02-18

## 2019-02-18 ENCOUNTER — OFFICE VISIT (OUTPATIENT)
Dept: FAMILY MEDICINE | Facility: CLINIC | Age: 39
End: 2019-02-18
Payer: COMMERCIAL

## 2019-02-18 VITALS
HEART RATE: 88 BPM | WEIGHT: 137 LBS | TEMPERATURE: 98.4 F | RESPIRATION RATE: 16 BRPM | BODY MASS INDEX: 22.82 KG/M2 | HEIGHT: 65 IN | DIASTOLIC BLOOD PRESSURE: 53 MMHG | SYSTOLIC BLOOD PRESSURE: 107 MMHG | OXYGEN SATURATION: 100 %

## 2019-02-18 VITALS — SYSTOLIC BLOOD PRESSURE: 111 MMHG | DIASTOLIC BLOOD PRESSURE: 66 MMHG | HEART RATE: 69 BPM

## 2019-02-18 DIAGNOSIS — L30.9 ECZEMA, UNSPECIFIED TYPE: ICD-10-CM

## 2019-02-18 DIAGNOSIS — Z00.00 ROUTINE HISTORY AND PHYSICAL EXAMINATION OF ADULT: Primary | ICD-10-CM

## 2019-02-18 DIAGNOSIS — E06.3 HASHIMOTO'S THYROIDITIS: ICD-10-CM

## 2019-02-18 DIAGNOSIS — L29.9 PRURITIC DISORDER: ICD-10-CM

## 2019-02-18 DIAGNOSIS — L30.9 DERMATITIS: Primary | ICD-10-CM

## 2019-02-18 DIAGNOSIS — N89.8 VAGINAL DISCHARGE: ICD-10-CM

## 2019-02-18 DIAGNOSIS — R10.11 ABDOMINAL PAIN, RIGHT UPPER QUADRANT: ICD-10-CM

## 2019-02-18 DIAGNOSIS — Z12.4 SCREENING FOR CERVICAL CANCER: ICD-10-CM

## 2019-02-18 DIAGNOSIS — K82.8 NON-FUNCTIONING GALLBLADDER: ICD-10-CM

## 2019-02-18 LAB
ALBUMIN SERPL-MCNC: 4 G/DL (ref 3.4–5)
ALP SERPL-CCNC: 42 U/L (ref 40–150)
ALT SERPL W P-5'-P-CCNC: 23 U/L (ref 0–50)
ANION GAP SERPL CALCULATED.3IONS-SCNC: 6 MMOL/L (ref 3–14)
AST SERPL W P-5'-P-CCNC: 14 U/L (ref 0–45)
BASOPHILS # BLD AUTO: 0 10E9/L (ref 0–0.2)
BASOPHILS NFR BLD AUTO: 0.7 %
BILIRUB SERPL-MCNC: 0.4 MG/DL (ref 0.2–1.3)
BUN SERPL-MCNC: 12 MG/DL (ref 7–30)
CALCIUM SERPL-MCNC: 8.4 MG/DL (ref 8.5–10.1)
CHLORIDE SERPL-SCNC: 108 MMOL/L (ref 94–109)
CO2 SERPL-SCNC: 26 MMOL/L (ref 20–32)
CREAT SERPL-MCNC: 0.55 MG/DL (ref 0.52–1.04)
DIFFERENTIAL METHOD BLD: NORMAL
EOSINOPHIL # BLD AUTO: 0.1 10E9/L (ref 0–0.7)
EOSINOPHIL NFR BLD AUTO: 2.2 %
ERYTHROCYTE [DISTWIDTH] IN BLOOD BY AUTOMATED COUNT: 12.3 % (ref 10–15)
GFR SERPL CREATININE-BSD FRML MDRD: >90 ML/MIN/{1.73_M2}
GLUCOSE SERPL-MCNC: 93 MG/DL (ref 70–99)
HCT VFR BLD AUTO: 38.8 % (ref 35–47)
HGB BLD-MCNC: 12.5 G/DL (ref 11.7–15.7)
IMM GRANULOCYTES # BLD: 0 10E9/L (ref 0–0.4)
IMM GRANULOCYTES NFR BLD: 0.2 %
LYMPHOCYTES # BLD AUTO: 1.7 10E9/L (ref 0.8–5.3)
LYMPHOCYTES NFR BLD AUTO: 29.7 %
MCH RBC QN AUTO: 30.6 PG (ref 26.5–33)
MCHC RBC AUTO-ENTMCNC: 32.2 G/DL (ref 31.5–36.5)
MCV RBC AUTO: 95 FL (ref 78–100)
MONOCYTES # BLD AUTO: 0.5 10E9/L (ref 0–1.3)
MONOCYTES NFR BLD AUTO: 7.8 %
NEUTROPHILS # BLD AUTO: 3.5 10E9/L (ref 1.6–8.3)
NEUTROPHILS NFR BLD AUTO: 59.4 %
NRBC # BLD AUTO: 0 10*3/UL
NRBC BLD AUTO-RTO: 0 /100
PLATELET # BLD AUTO: 248 10E9/L (ref 150–450)
POTASSIUM SERPL-SCNC: 4 MMOL/L (ref 3.4–5.3)
PROT SERPL-MCNC: 7.6 G/DL (ref 6.8–8.8)
RBC # BLD AUTO: 4.09 10E12/L (ref 3.8–5.2)
SODIUM SERPL-SCNC: 139 MMOL/L (ref 133–144)
SPECIMEN SOURCE: ABNORMAL
TSH SERPL DL<=0.005 MIU/L-ACNC: 1.33 MU/L (ref 0.4–4)
WBC # BLD AUTO: 5.8 10E9/L (ref 4–11)
WET PREP SPEC: ABNORMAL

## 2019-02-18 RX ORDER — TACROLIMUS 1 MG/G
OINTMENT TOPICAL 2 TIMES DAILY
Qty: 100 G | Refills: 3 | Status: ON HOLD | OUTPATIENT
Start: 2019-02-18 | End: 2021-02-05

## 2019-02-18 ASSESSMENT — PAIN SCALES - GENERAL
PAINLEVEL: NO PAIN (0)
PAINLEVEL: EXTREME PAIN (8)

## 2019-02-18 ASSESSMENT — MIFFLIN-ST. JEOR: SCORE: 1297.92

## 2019-02-18 NOTE — LETTER
RE: Karen Beverly  [address]  [place] [address]     Dear Colleague,    Thank you for referring your patient, Karen Beverly, to the [facility] DERMATOLOG[facility] at [facility]. Please see a copy of my visit note below.    [facility] Dermatology Note    Dermatology Problem List:  1. Dermatitis   -referred for patch testing (2/18/19)   - tacrolimus 0.1% ointment   - prior triamcinolone and hydrocortisone valerate without improvement    Encounter Date: Feb 18, 2019    CC:  Chief Complaint   Patient presents with     Derm Problem     follow up for dermatitis, not seeing improvement.          History of Present Illness:  Ms. Karen Beverly is a 38 year old female who presents as a follow-up for dermatitis. The patient was last seen 11/2/18 by Kevin Zarco MD, when she started triamcinolone 0.1% ointment and hydrocortisone valerate 0.2% ointment. Today, the patient reports that she has not noticed any improvement despite 2 months of consistent use. She states there is involvement on her scalp, ears, neck, axillae, and her legs. She states that the involvement on her legs has caused her to stay up at night scratching. The rash developed last March around her ears and gradually spread to other areas on her body. She has used triamcinolone and hydrocortisone topicals with brief periods of relief, but she states the rash has persisted and she has stopped using the topicals. The pruritus flares in the afternoons and evenings, particularly when she is doing house work. She is concerned that this could possibly be scabies as her  travels a fair amount and could have picked it up from a hotel. The patient voices no other concerns.    Past Medical History:   Patient Active Problem List   Diagnosis     Pregnancy related condition     Elevated TSH     Thyroid disease, antepartum     History of multiple miscarriages     Hashimoto's thyroiditis     Indication for  care in labor or delivery     Labor and delivery, indication for care     Rhinoconjunctivitis     Chronic sinusitis, unspecified location     Gastroesophageal reflux disease without esophagitis     Non-functioning gallbladder     Eczema, unspecified type     Pruritic disorder     Past Medical History:   Diagnosis Date     Eczema      Endometriosis      Thyroid disease      Past Surgical History:   Procedure Laterality Date     DAVINCI PELVIC PROCEDURE  2/22/2012    Procedure:DAVINCI PELVIC PROCEDURE; DAVINCI DIAGNOSTIC PELVISCOPY WITH OMNIGUIDE C02 LASER, DIAGNOSTIC HYSTEROSCOPY, EXTENSIVE LYSIS EXCISION OF ENDOMETRIOSIS, BILATERAL URETEROLYSIS, D & C; Surgeon:JOHN KELLY; Location: OR       Social History:  Patient reports that  has never smoked. she has never used smokeless tobacco. She reports that she does not drink alcohol or use drugs.    Family History:  Family History   Problem Relation Age of Onset     Hypertension Mother      Cancer Other         liver cancer     Liver Cancer Maternal Grandmother 79     Hypertension Maternal Grandfather      Heart Disease Paternal Grandmother 62     Heart Disease Paternal Grandfather 60     Melanoma No family hx of      Skin Cancer No family hx of        Medications:  Current Outpatient Medications   Medication Sig Dispense Refill     hydrocortisone (ANUSOL-HC) 2.5 % cream Place rectally 2 times daily as needed for hemorrhoids 30 g 1     hydrocortisone valerate (WEST-BELKYS) 0.2 % ointment Apply topically 2 times daily Behind ears, eyelids, and underarms 15 g 0     levothyroxine (SYNTHROID/LEVOTHROID) 75 MCG tablet Take 1 tablet (75 mcg) by mouth daily 90 tablet 3     PRENATAL VITAMINS PO Take 1 tablet by mouth daily.         triamcinolone (KENALOG) 0.1 % ointment Apply topically 2 times daily Avoid using on face. 60 g 0       Allergies   Allergen Reactions     Augmentin Nausea and Vomiting     Latex Rash     Physical exam:  Vitals: /66 (BP Location: Left  arm)   Pulse 69   GEN: This is a well developed, well-nourished male in no acute distress, in a pleasant mood.    SKIN: Payne phototype: II   Examination of the back, abdomen, upper chest, face, scalp, neck, upper extremities, and lower legs was performed.   -Faint pink patches with overlying scale in the postauricular sulci    -erythema and lichenification on the upper eyelids   -Few excoriated papules on the chest and back  -Faint erythematous papules on the L brow  -No other lesions of concern on areas examined.     Impression/Plan:  1. Dermatitis: distribution evocative of contact dermatitis though minimal exam findings today. Also consider seborrheic dermatitis versus atopic dermatitis. Scabies is not under diagnostic consideration, as the clinical history, morphology, and distribution are inconsistent. Scabies involves the webspaces of the hands and genitals in the vast majority of cases, and does not have a facial predominance in adults. Non-intertriginous areas (shins) are rarely affected, and typically only in cases of crusted scabies in the immunocompromised (prominent hyperkeratosis is unmistakable in crusted scabies).     Reassured patient that her eruption is not scabies    Plan for patch testing with Dr. Gonsalves    CBC, CMP, thyroid studies pending per PCP    Start: tacrolimus 0.1% ointment to the affected areas    CC Kevin Zarco MD  [address] [place] [address] on close of this encounter.  Follow-up prn for new or changing lesions.     Staff Involved:  Scribe/Staff    Scribe Disclosure  I, Dominic Najjar, am serving as a scribe to document services personally performed by Dr. Jluis Monzon MD, based on data collection and the provider's statements to me.    Provider Disclosure:   The documentation recorded by the scribe accurately reflects the services I personally performed and the decisions made by me.    Jluis Monzon MD   of Dermatology  Department  of Dermatology  [facility]

## 2019-02-18 NOTE — PROGRESS NOTES
[facility] Dermatology Note      Dermatology Problem List:  1. Dermatitis   -referred for patch testing (2/18/19)   - tacrolimus 0.1% ointment   - prior triamcinolone and hydrocortisone valerate without improvement    Encounter Date: Feb 18, 2019    CC:  Chief Complaint   Patient presents with     Derm Problem     follow up for dermatitis, not seeing improvement.          History of Present Illness:  Ms. Karen Beverly is a 38 year old female who presents as a follow-up for dermatitis. The patient was last seen 11/2/18 by Kevin Zarco MD, when she started triamcinolone 0.1% ointment and hydrocortisone valerate 0.2% ointment. Today, the patient reports that she has not noticed any improvement despite 2 months of consistent use. She states there is involvement on her scalp, ears, neck, axillae, and her legs. She states that the involvement on her legs has caused her to stay up at night scratching. The rash developed last March around her ears and gradually spread to other areas on her body. She has used triamcinolone and hydrocortisone topicals with brief periods of relief, but she states the rash has persisted and she has stopped using the topicals. The pruritus flares in the afternoons and evenings, particularly when she is doing house work. She is concerned that this could possibly be scabies as her  travels a fair amount and could have picked it up from a hotel. The patient voices no other concerns.    Past Medical History:   Patient Active Problem List   Diagnosis     Pregnancy related condition     Elevated TSH     Thyroid disease, antepartum     History of multiple miscarriages     Hashimoto's thyroiditis     Indication for care in labor or delivery     Labor and delivery, indication for care     Rhinoconjunctivitis     Chronic sinusitis, unspecified location     Gastroesophageal reflux disease without esophagitis     Non-functioning gallbladder     Eczema, unspecified type      Pruritic disorder     Past Medical History:   Diagnosis Date     Eczema      Endometriosis      Thyroid disease      Past Surgical History:   Procedure Laterality Date     DAVINCI PELVIC PROCEDURE  2/22/2012    Procedure:DAVINCI PELVIC PROCEDURE; DAVINCI DIAGNOSTIC PELVISCOPY WITH OMNIGUIDE C02 LASER, DIAGNOSTIC HYSTEROSCOPY, EXTENSIVE LYSIS EXCISION OF ENDOMETRIOSIS, BILATERAL URETEROLYSIS, D & C; Surgeon:JOHN KELLY; Location: OR       Social History:  Patient reports that  has never smoked. she has never used smokeless tobacco. She reports that she does not drink alcohol or use drugs.    Family History:  Family History   Problem Relation Age of Onset     Hypertension Mother      Cancer Other         liver cancer     Liver Cancer Maternal Grandmother 79     Hypertension Maternal Grandfather      Heart Disease Paternal Grandmother 62     Heart Disease Paternal Grandfather 60     Melanoma No family hx of      Skin Cancer No family hx of        Medications:  Current Outpatient Medications   Medication Sig Dispense Refill     hydrocortisone (ANUSOL-HC) 2.5 % cream Place rectally 2 times daily as needed for hemorrhoids 30 g 1     hydrocortisone valerate (WEST-BELKYS) 0.2 % ointment Apply topically 2 times daily Behind ears, eyelids, and underarms 15 g 0     levothyroxine (SYNTHROID/LEVOTHROID) 75 MCG tablet Take 1 tablet (75 mcg) by mouth daily 90 tablet 3     PRENATAL VITAMINS PO Take 1 tablet by mouth daily.         triamcinolone (KENALOG) 0.1 % ointment Apply topically 2 times daily Avoid using on face. 60 g 0       Allergies   Allergen Reactions     Augmentin Nausea and Vomiting     Latex Rash       Review of Systems:  -As per HPI  -Constitutional: Otherwise feeling well today, in usual state of health.  -HEENT: Patient denies nonhealing oral sores.  -Skin: As above in HPI. No additional skin concerns.    Physical exam:  Vitals: /66 (BP Location: Left arm)   Pulse 69   GEN: This is a well  developed, well-nourished male in no acute distress, in a pleasant mood.    SKIN: Payne phototype: II   Examination of the back, abdomen, upper chest, face, scalp, neck, upper extremities, and lower legs was performed.   -Faint pink patches with overlying scale in the postauricular sulci    -erythema and lichenification on the upper eyelids   -Few excoriated papules on the chest and back  -Faint erythematous papules on the L brow  -No other lesions of concern on areas examined.       Impression/Plan:  1. Dermatitis: distribution evocative of contact dermatitis though minimal exam findings today. Also consider seborrheic dermatitis versus atopic dermatitis. Scabies is not under diagnostic consideration, as the clinical history, morphology, and distribution are inconsistent. Scabies involves the webspaces of the hands and genitals in the vast majority of cases, and does not have a facial predominance in adults. Non-intertriginous areas (shins) are rarely affected, and typically only in cases of crusted scabies in the immunocompromised (prominent hyperkeratosis is unmistakable in crusted scabies).     Reassured patient that her eruption is not scabies    Plan for patch testing with Dr. Gonsalves    CBC, CMP, thyroid studies pending per PCP    Start: tacrolimus 0.1% ointment to the affected areas      CC Kevin Zarco MD  [address] [place] [phone] on close of this encounter.  Follow-up prn for new or changing lesions.       Staff Involved:  Scribe/Staff    Scribe Disclosure  I, Dominic Najjar, am serving as a scribe to document services personally performed by Dr. Jluis Monzon MD, based on data collection and the provider's statements to me.    Provider Disclosure:   The documentation recorded by the scribe accurately reflects the services I personally performed and the decisions made by me.    Jluis Monzon MD   of Dermatology  Department of Dermatology  [facility]  [employer]

## 2019-02-18 NOTE — TELEPHONE ENCOUNTER
----- Message from Jluis Monzon MD sent at 2/18/2019 11:48 AM CST -----  Moe Alonso,    I saw Ms. Beverly today in clinic. She's a woman with an eczematous rash on the postauricular sulci and eyelids that I was wondering if you could see for patch testing. She is currently breastfeeding - let me know if this would preclude testing.    Thank you!  Ryan

## 2019-02-18 NOTE — LETTER
Patient:  aKren Lee  :   1980  MRN:     [id number]        Ms. Karen Lee  [address]  [facility] [address]        2019    Dear Ms. Lee,    Thank you for choosing the [facility] for your healthcare needs.  We appreciate the opportunity to serve you.    Your results indicate bacterial vaginosis a vaginal bacterial slight overgrowth not a STI but may be causing discharge. Will treat with Metronidazole 500 mg twice daily by mouth for 7 days no alcohol while on the treatment.   All other results are normal including no chlamydia or gonorrhea negative screen and normal PAP NIL neg HPV next in 5 years due 2024. We recommend an annual health visit. Please call if questions.       The following are your recent test results.     Results for orders placed or performed in visit on 19   Pap imaged thin layer screen with HPV - recommended age 30 - 65 years (select HPV order below)   Result Value Ref Range    PAP NIL     Copath Report         Patient Name: KAREN LEE  MR#: [id number]  Specimen #: [id number]  Collected: 2019  Received: 2019  Reported: 2019 14:55  Ordering Phy(s): WILLY SMITH    For improved result formatting, select 'View Enhanced Report Format' under   Linked Documents section.    SPECIMEN/STAIN PROCESS:  Pap imaged thin layer prep screening (Surepath, FocalPoint with guided   screening)       Pap-Cyto x 1, HPV ordered x 1    SOURCE: Cervical, endocervical  ----------------------------------------------------------------   Pap imaged thin layer prep screening (Surepath, FocalPoint with guided   screening)  SPECIMEN ADEQUACY:  Satisfactory for evaluation.  -Transformation zone component present.    CYTOLOGIC INTERPRETATION:    Negative for intraepithelial lesion or malignancy    Electronically signed out by:  NELLI To (ASCP)    Processed and screened at [facility],    [facility]    CLINICAL HISTORY:  LMP: 1/29/19  Irregular Bleedi ng  Irregular periods, Previous ASC-US  Date of Last Pap: 2/1/2016,    Papanicolaou Test Limitations:  Cervical cytology is a screening test with   limited sensitivity; regular  screening is critical for cancer prevention; Pap tests are primarily   effective for the diagnosis/prevention of  squamous cell carcinoma, not adenocarcinomas or other cancers.    TESTING LAB LOCATION:  [facility], [address]  [id number] [phone]    COLLECTION SITE:  Client:  [facility]  Location: [facility] (B)       HPV High Risk Types DNA Cervical   Result Value Ref Range    HPV Source SurePath     HPV 16 DNA Negative NEG^Negative    HPV 18 DNA Negative NEG^Negative    Other HR HPV Negative NEG^Negative    Final Diagnosis This patient's sample is negative for HPV DNA.     Specimen Description Cervical Cells    Wet prep   Result Value Ref Range    Specimen Description Cervical     Wet Prep No Trichomonas seen     Wet Prep No yeast seen     Wet Prep PMNs seen  Moderate       Wet Prep Few  Clue cells seen  Few   (A)    C. trachomatis PCR - Endocervical Swab, Clinic Collect   Result Value Ref Range    Specimen Description Cervical     Chlamydia Trachomatis PCR Negative NEG^Negative   N. gonorrhea PCR - Endocervical Swab, Clinic Collect   Result Value Ref Range    Specimen Descrip Cervical     N Gonorrhea PCR Negative NEG^Negative     Please contact your provider if you have any questions or concerns.  We look forward to serving your needs in the future.      Sincerely,      Lars Burt MD

## 2019-02-18 NOTE — PROGRESS NOTES
[facility]  Primary Care Center   Lars Burt MD  2019      Chief Complaint:   Physical   Follow-up on Gallbladder    History of Present Illness:   Karen Beverly is a 38 year old female with a history of endometriosis, Hashimoto's thyroiditis, and recent findings of non-functioning gallbladder who presents for physical.    Abdominal pain  Karen had a NM Hepatobiliary scan on  for further evaluation of ongoing abdominal pain (see below; see my note from 2018 for full summary). Liver hemangioma has been stable throughout imaging. She is wondering what these results mean, and if she has to have surgery or if there are other changes she can make for this. Karen looked up her symptoms on the Internet--she was concerned that her eczema could be related to her problems with her gallbladder. She would prefer no surgical intervention as her grandmother  after complications from gallbladder surgery and had GI cancer.    Examination:  NM HEPATOBILIARY SCAN WITH GB EF 2019                                                Impression:  1. No evidence of cystic duct obstruction.  2. Decreased gallbladder ejection fraction suggesting functional gall  bladder disorder.     I have personally reviewed the examination and initial interpretation  and I agree with the findings.  HAI CHOU MD    Eczema  Reports that her eczema has gotten worse recently, including burning of her skin. She has this behind her ears, for example. She is using the creams suggested to her by dermatology, including steroids--she will be seeing dermatology later today to discuss this further. Karen wonders if it should be spreading around her body as much as it is. There is not family history of eczema. Of note, she has been under a notable amount of stress recently as her children have been more sick (see below).  Her  travels often.  She notes her skin is very pruritic even though she uses emollient. At one point  she tried antifungal clotrimazole without improvement.  OBGYN  LMP 01/29--Karen reports that her menses is irregular in nature. She reports some difficulty holding her urine with exercising, but she is hoping that when she loses weight for the summer this will get better. Denies dysuria.     Healthcare Maintenance   Pap (females age 21 - 65): every 3 years, every 5 years after age 35 if cotesting done - Recommended  Lab Results   Component Value Date    PAP ASC-US 02/01/2016   Glucose: every 5 years, yearly if risk factors? - Up to Date 2018  Lipid panel: every 5 years, yearly if risk factors - Up to Date 2016  HIV (ages 13 - 64): once per lifetime, yearly if MSM or other risk factors - Up to Date 2012  Immunizations (Tetanus every 10 years, Influenza yearly, Pneumonia PPV-23 and PCV-13 age ? 65 or sooner if risk factors) - Up to Date   Immunization History   Administered Date(s) Administered     TDAP Vaccine (Boostrix) 01/05/2017      The following health maintenance issues were also reviewed and addressed as needed:  Blood pressure (>18 years annually)  Lifestyle habits (including exercise, diet, weight)  Health risk behaviors (sexual history, alcohol, tobacco, drug use, partner violence)  Routine eye, dental, hearing, and skin exams  Advanced directives    Other concerns discussed:  1) Children - Reports that her son has had two febrile seizures in the last year--she gives him Tylenol for his fevers, but they have progressed to seizure twice. The last time this happened, in November, Karen was home alone with her son and called EMS to bring him into the [facility] for further evaluation.  Her daughter  Has enlarged tonsils and may require intervention    Review of Systems:   Pertinent items are noted in HPI and below, remainder of complete ROS is negative.    Answers for HPI/ROS submitted by the patient on 2/12/2019   General Symptoms: No  Skin Symptoms: Yes  HENT Symptoms: No  EYE SYMPTOMS: No  HEART  SYMPTOMS: No  LUNG SYMPTOMS: No  INTESTINAL SYMPTOMS: Yes  URINARY SYMPTOMS: No  GYNECOLOGIC SYMPTOMS: No  BREAST SYMPTOMS: No  SKELETAL SYMPTOMS: No  BLOOD SYMPTOMS: No  NERVOUS SYSTEM SYMPTOMS: No  MENTAL HEALTH SYMPTOMS: No  Changes in hair: No  Changes in moles/birth marks: No  Itching: Yes  Rashes: Yes  Hair in places you don't want it: No  Change in facial hair: No  Warts: No  Non-healing sores: No  Scarring: No  Flaking of skin: No  Color changes of hands/feet in cold : No  Sun sensitivity: No  Skin thickening: No  Heart burn or indigestion: Yes  Vomiting: No  Abdominal pain: Yes  Bloating: No  Constipation: No  Diarrhea: No  Blood in stool: No  Black stools: No  Rectal or Anal pain: No  Yellowing of skin or eyes: No  Vomit with blood: No  Change in stools: No    Active Medications:       hydrocortisone (ANUSOL-HC) 2.5 % cream, Place rectally 2 times daily as needed for hemorrhoids, Disp: 30 g, Rfl: 1     hydrocortisone valerate (WEST-BELKYS) 0.2 % ointment, Apply topically 2 times daily Behind ears, eyelids, and underarms, Disp: 15 g, Rfl: 0     levothyroxine (SYNTHROID/LEVOTHROID) 75 MCG tablet, Take 1 tablet (75 mcg) by mouth daily, Disp: 90 tablet, Rfl: 3     PRENATAL VITAMINS PO, Take 1 tablet by mouth daily.  , Disp: , Rfl:      triamcinolone (KENALOG) 0.1 % ointment, Apply topically 2 times daily Avoid using on face., Disp: 60 g, Rfl: 0      Allergies:   Augmentin and Latex      Past Medical History:  Eczema   Endometriosis   Thyroid disease, antepartum   History of multiple miscarriages   Hashimoto's thyroiditis   Indication for care in labor or delivery   Rhinoconjunctivitis   Chronic sinusitis   Gastroesophageal reflux disease   Non-functioning gallbladder      Past Surgical History:  DaVinci pelvic procedure     Family History:   Mother - hypertension   Maternal grandmother - liver cancer (), gallbladder disease s/p cholecystectomy   Maternal grandfather - hypertension   Paternal grandmother  "- heart disease  Paternal grandfather - heart disease       Social History:   The patient was alone.  Smoking Status: Never   Smokeless Tobacco: Never   Alcohol Use: No    Marital Status:     Home: Karen is originally from [place], and now lives in [place] with her  and two children. Daughter is in , and her son, who is younger, is still at home with her.      Physical Exam:   /53 (BP Location: Right arm, Patient Position: Sitting, Cuff Size: Adult Regular)   Pulse 88   Temp 98.4  F (36.9  C) (Oral)   Resp 16   Ht 1.66 m (5' 5.35\")   Wt 62.1 kg (137 lb)   SpO2 100%   Breastfeeding? No   BMI 22.55 kg/m       General:  Pleasant, alert, no acute distress  EYES: Eyes grossly normal to inspection, PERRL and conjunctivae and sclerae normal  HENT: ear canals and TM's normal, nose and mouth without ulcers or lesions, oropharynx clear and oral mucous membranes moist  NECK: no adenopathy, no asymmetry, masses, or scars and thyroid normal to palpation  Lungs:  Clear to auscultation throughout, no wheezes, rhonchi or rales.  C/V:  Regular rate and rhythm, no murmurs, rubs or gallops.  No JVD, no carotid bruits.  No peripheral edema.    BREAST: normal without masses, tenderness or nipple discharge and no palpable axillary masses or adenopathy    Abdomen:  Not distended.  Bowel sounds active.  No tenderness, no hepatosplenomegaly or masses.  No CVA tenderness or masses. Negative barajas's sign, liver and spleen normal.    (female): normal female external genitalia, Skeene and Bartholin's glands normal, vaginal mucosa pink, moist, well rugated and normal, multiparous cervix, small amount of yellow, non purulent normal discharge present, no cervical lesions, normal adnexae, and uterus without masses. Normal vaginal discharge    Lymph:  No cervical, axillary or inguinal lymph nodes.  Neuro:   Face symmetric. No tremor.  Gait steady.   M/S:  No joint deformities noted.  No joint " swelling.  Skin:  No jaundice.  Normal moisture, good skin turgor. Behind bilateral ears, right worse than left, erythematous patch with peripheral flaky scale. She has scattered small patches with excoriations on her face, chest, abdomen and back, which appear as nummular eczema.   Psych:  Broad range affect.  Not psychomotor slowed.  No signs of anxiety.      Assessment and Plan:  Karen Beverly is a 38 year old female with a history of endometriosis, Hashimoto's thyroiditis, and recent findings of non-functioning gallbladder who presents for physical.     Routine history and physical examination of adult  Healthcare maintenance updated and concerns discussed with plans as below. Full exam completed as above.     Screening for cervical cancer  Pap smear 2016 showed ASCUS. Repeat pap completed today, alongside HPV screening test.   - Pap imaged thin layer screen with HPV - recommended age 30 - 65 years (select HPV order below)  - HPV High Risk Types DNA Cervical    Non-functioning gallbladder  Reviewed recent NM hepatobiliary scan with Karen, showing stable hemangioma and findings consistent with non-functioning gallbladder. She does not want surgery at this time, but I recommended dietary changes to help with her abdominal pain. If her symptoms should worsen, we discussed that she is always welcome to follow up with general surgery to discuss cholecystectomy. She is understanding of this.    - Comprehensive metabolic panel    Hashimoto's thyroiditis  In light of her worsening eczema, and increased stress, will recheck TSH levels for Karen.   - TSH with free T4 reflex    Pruritic disorder  She wonders if her eczema is related to her liver hemangioma and nonfunctioning gallbladder. Discussed with her that her labs revealed normal liver function, and I was not suspicious that her liver would worsen her pruritis and eczema symptoms. The air is very dry as it is winter, and this is likely exacerbating her symptoms  requiring increased lubrication. I also wonder if there could be an allergen component to this, which we discussed such as allergy to acrylates nail polish. She does not take an antihistamine. Most recent thyroid level stable, and so is less likely to be causing her increased itching and dryness. We reviewed liver concerns may cause pruritis but her LFTs are normal.     Of note, her  does travel a lot, and recently had the rash on his penis. I discussed the possibility of scabies with Karen, as well, because of her 's traveling and use of hotel rooms. She will discuss this with dermatology--I would like her to try treatment for scabies, even if it does not entirely fit her presentation, as she also has scattered excoriations with itching on her face, there are some on her abdomen as well.   - Comprehensive metabolic panel  - CBC with platelets differential    Eczema, unspecified type  Sees dermatology today.     Vaginal discharge  Karen noted that her  got a new rash after the last time they had sexual intercourse, and would like to be checked for signs of infection for this today. They are monogamous, but she had some itching and is concerned nonetheless.   - Wet prep  - C. trachomatis PCR - Endocervical Swab, Clinic Collect  - N. gonorrhea PCR - Endocervical Swab, Clinic Collect       Follow-up: Return in about 1 year (around 2/18/2020).         Scribe Disclosure:   I, Hyun Meraz, am serving as a scribe to document services personally performed by Lars Burt MD at this visit, based upon the provider's statements to me. All documentation has been reviewed by the aforementioned provider prior to being entered into the official medical record.     Portions of this medical record were completed by a scribe. UPON MY REVIEW AND AUTHENTICATION BY ELECTRONIC SIGNATURE, this confirms (a) I performed the applicable clinical services, and (b) the record is accurate.   Lars Burt MD

## 2019-02-18 NOTE — PATIENT INSTRUCTIONS
[facility] Medication Refill Request Information:  * Please contact your pharmacy regarding ANY request for medication refills.  ** [facility] Prescription Fax = [phone]  * Please allow 3 business days for routine medication refills.  * Please allow 5 business days for controlled substance medication refills.     [facility] Test Result notification information:  *You will be notified with in 7-10 days of your appointment day regarding the results of your test.  If you are on MyChart you will be notified as soon as the provider has reviewed the results and signed off on them.    [facility]: [phone]

## 2019-02-18 NOTE — NURSING NOTE
Chief Complaint   Patient presents with     Physical     patient is here for annual physical        DEMETRIO Dyer at 10:25 AM on 2/18/2019.

## 2019-02-18 NOTE — NURSING NOTE
Chief Complaint   Patient presents with     Derm Problem     follow up for dermatitis, not seeing improvement.      Dorothea Montero, EMT

## 2019-02-19 LAB
C TRACH DNA SPEC QL NAA+PROBE: NEGATIVE
N GONORRHOEA DNA SPEC QL NAA+PROBE: NEGATIVE
SPECIMEN SOURCE: NORMAL
SPECIMEN SOURCE: NORMAL

## 2019-02-21 LAB
COPATH REPORT: NORMAL
PAP: NORMAL

## 2019-02-22 LAB
FINAL DIAGNOSIS: NORMAL
HPV HR 12 DNA CVX QL NAA+PROBE: NEGATIVE
HPV16 DNA SPEC QL NAA+PROBE: NEGATIVE
HPV18 DNA SPEC QL NAA+PROBE: NEGATIVE
SPECIMEN DESCRIPTION: NORMAL
SPECIMEN SOURCE CVX/VAG CYTO: NORMAL

## 2019-02-24 ENCOUNTER — TELEPHONE (OUTPATIENT)
Dept: FAMILY MEDICINE | Facility: CLINIC | Age: 39
End: 2019-02-24

## 2019-02-24 DIAGNOSIS — N76.0 BACTERIAL VAGINOSIS: Primary | ICD-10-CM

## 2019-02-24 DIAGNOSIS — B96.89 BACTERIAL VAGINOSIS: Primary | ICD-10-CM

## 2019-02-24 RX ORDER — METRONIDAZOLE 500 MG/1
500 TABLET ORAL 2 TIMES DAILY
Qty: 14 TABLET | Refills: 0 | Status: SHIPPED | OUTPATIENT
Start: 2019-02-24 | End: 2019-03-03

## 2019-02-24 NOTE — TELEPHONE ENCOUNTER
Results for orders placed or performed in visit on 02/18/19   CBC with platelets differential   Result Value Ref Range    WBC 5.8 4.0 - 11.0 10e9/L    RBC Count 4.09 3.8 - 5.2 10e12/L    Hemoglobin 12.5 11.7 - 15.7 g/dL    Hematocrit 38.8 35.0 - 47.0 %    MCV 95 78 - 100 fl    MCH 30.6 26.5 - 33.0 pg    MCHC 32.2 31.5 - 36.5 g/dL    RDW 12.3 10.0 - 15.0 %    Platelet Count 248 150 - 450 10e9/L    Diff Method Automated Method     % Neutrophils 59.4 %    % Lymphocytes 29.7 %    % Monocytes 7.8 %    % Eosinophils 2.2 %    % Basophils 0.7 %    % Immature Granulocytes 0.2 %    Nucleated RBCs 0 0 /100    Absolute Neutrophil 3.5 1.6 - 8.3 10e9/L    Absolute Lymphocytes 1.7 0.8 - 5.3 10e9/L    Absolute Monocytes 0.5 0.0 - 1.3 10e9/L    Absolute Eosinophils 0.1 0.0 - 0.7 10e9/L    Absolute Basophils 0.0 0.0 - 0.2 10e9/L    Abs Immature Granulocytes 0.0 0 - 0.4 10e9/L    Absolute Nucleated RBC 0.0    Comprehensive metabolic panel   Result Value Ref Range    Sodium 139 133 - 144 mmol/L    Potassium 4.0 3.4 - 5.3 mmol/L    Chloride 108 94 - 109 mmol/L    Carbon Dioxide 26 20 - 32 mmol/L    Anion Gap 6 3 - 14 mmol/L    Glucose 93 70 - 99 mg/dL    Urea Nitrogen 12 7 - 30 mg/dL    Creatinine 0.55 0.52 - 1.04 mg/dL    GFR Estimate >90 >60 mL/min/[1.73_m2]    GFR Estimate If Black >90 >60 mL/min/[1.73_m2]    Calcium 8.4 (L) 8.5 - 10.1 mg/dL    Bilirubin Total 0.4 0.2 - 1.3 mg/dL    Albumin 4.0 3.4 - 5.0 g/dL    Protein Total 7.6 6.8 - 8.8 g/dL    Alkaline Phosphatase 42 40 - 150 U/L    ALT 23 0 - 50 U/L    AST 14 0 - 45 U/L   TSH with free T4 reflex   Result Value Ref Range    TSH 1.33 0.40 - 4.00 mU/L     2/18/19 11:25 AM [id number]    Specimen Information: Cervical        Component Collected Lab   Specimen Description 02/18/2019 11:25 AM 1740   Cervical    Wet Prep 02/18/2019 11:25 AM 1740   No Trichomonas seen    Wet Prep 02/18/2019 11:25 AM 1740   No yeast seen    Wet Prep 02/18/2019 11:25 AM 1740   PMNs seen   Moderate    Wet Prep  (Abnormal) 02/18/2019 11:25 AM 1740   Abnormal   Few   Clue cells seen   Few        [phone] (home)   Call with the above results RE bacterial vaginosis not a STI but may be causing her discharge. Will treat with Metronidazole Twice daily by mouth for 7 days no alcohol while on the treatment.  All other results are normal and normal PAP NIL neg HPV next in 5 years due 2/2024. We recommend an annual health visit.  Best wishes,  Lars Burt MD

## 2019-02-25 NOTE — TELEPHONE ENCOUNTER
Patient was reached by phone and Dr. Burt's message was relayed.  She will go to pharmacy to  Rx.  Patient will plan to check My Chart for results numbers, and make annual visit with Dr. Burt through My Chart.    Nathalie Crowell RN on 2/25/2019 at 10:39 AM

## 2019-03-21 NOTE — TELEPHONE ENCOUNTER
FUTURE VISIT INFORMATION      FUTURE VISIT INFORMATION:    Date: 3/25    Time: 10a    Location: Derm  REFERRAL INFORMATION:    Referring provider:  Dr. Monzon    Referring providers clinic:  Derm    Reason for visit/diagnosis  NAL    RECORDS REQUESTED FROM:       Clinic name Comments Records Status Imaging Status   [facility] 2/18/19 OV Notes and referral EPIC    [facility] 10/2/17 OV Note and Allergy Testing Dr. Ring EPIC

## 2019-03-25 ENCOUNTER — PRE VISIT (OUTPATIENT)
Dept: DERMATOLOGY | Facility: CLINIC | Age: 39
End: 2019-03-25

## 2019-03-25 ENCOUNTER — OFFICE VISIT (OUTPATIENT)
Dept: DERMATOLOGY | Facility: CLINIC | Age: 39
End: 2019-03-25

## 2019-03-25 DIAGNOSIS — L23.9 ALLERGIC CONTACT DERMATITIS, UNSPECIFIED TRIGGER: Primary | ICD-10-CM

## 2019-03-25 DIAGNOSIS — Z86.39 HISTORY OF ELEVATED GLUCOSE: ICD-10-CM

## 2019-03-25 DIAGNOSIS — L23.9 ALLERGIC CONTACT DERMATITIS, UNSPECIFIED TRIGGER: ICD-10-CM

## 2019-03-25 DIAGNOSIS — R10.11 ABDOMINAL PAIN, RIGHT UPPER QUADRANT: ICD-10-CM

## 2019-03-25 LAB — GLUCOSE SERPL-MCNC: 92 MG/DL (ref 70–99)

## 2019-03-25 ASSESSMENT — PAIN SCALES - GENERAL: PAINLEVEL: NO PAIN (0)

## 2019-03-25 NOTE — NURSING NOTE
Dermatology Rooming Note    Karen Beverly's goals for this visit include:   Chief Complaint   Patient presents with     Allergies     Karen is here for allergy consult     Jaci Gutierrez, CMA

## 2019-03-25 NOTE — PROGRESS NOTES
"[facility] Dermatology Note    Dermatology Problem List:  1. Dermatitis   -referred for patch testing (2/18/19)   - tacrolimus 0.1% ointment   - prior triamcinolone and hydrocortisone valerate without improvement    Encounter Date: Mar 25, 2019    CC:  Dermatitis, consideration of patch testing     History of Present Illness:  Ms. Karen Beverly is a 38 year old female who presents for consideration of patch testing for recurrent dermatitis involving her scalp, ears, eyelids, neck, axillae, and legs that has been going on since last March 2018. She reports redness around her eyelids and itching on her legs today. She has tried triamcinolone and hydrocortisone which didn't not provide relief. She was prescribed tacrolimus ointment at her last visit but hasn't used it yet. She notes allergies to augmentin and latex but denies a history of asthma or seasonal allergies. She switched her products to Dove from [employer]. She uses Tide laundry detergent but not the sensitive skin type. She does not color her hair. Her son has \"bad skin\" though and she thinks it might be eczema. She is taking him to the doctor soon to evaluate this. Finally, her family recently took a trip to [place] and her skin (and her son's) skin improved there.     She switched from [employer] to Dove products. She uses Tide detergent and puts her clothes on an extra rinse cycle. She also uses children's products for her kids clothing.     She saw allergy for environmental allergen skin testing which was negative 10/2017 but the probe was also not reactive. She notes an allergy to Latex gloves; she feels her hands get irritated after using them but denies having hives.     She notes that she is still breastfeeding her son.     Past Medical History:   Patient Active Problem List   Diagnosis     Pregnancy related condition     Elevated TSH     Thyroid disease, antepartum     History of multiple miscarriages     Hashimoto's thyroiditis     " Indication for care in labor or delivery     Labor and delivery, indication for care     Rhinoconjunctivitis     Chronic sinusitis, unspecified location     Gastroesophageal reflux disease without esophagitis     Non-functioning gallbladder     Eczema, unspecified type     Pruritic disorder     Past Medical History:   Diagnosis Date     Eczema      Endometriosis      Thyroid disease      Past Surgical History:   Procedure Laterality Date     DAVINCI PELVIC PROCEDURE  2/22/2012    Procedure:DAVINCI PELVIC PROCEDURE; DAVINCI DIAGNOSTIC PELVISCOPY WITH OMNIGUIDE C02 LASER, DIAGNOSTIC HYSTEROSCOPY, EXTENSIVE LYSIS EXCISION OF ENDOMETRIOSIS, BILATERAL URETEROLYSIS, D & C; Surgeon:JOHN KELLY; Location: OR     Social History:  Patient reports that  has never smoked. she has never used smokeless tobacco. She reports that she does not drink alcohol or use drugs.    Family History:  Family History   Problem Relation Age of Onset     Hypertension Mother      Cancer Other         liver cancer     Liver Cancer Maternal Grandmother 79     Hypertension Maternal Grandfather      Heart Disease Paternal Grandmother 62     Heart Disease Paternal Grandfather 60     Melanoma No family hx of      Skin Cancer No family hx of      Medications:  Current Outpatient Medications   Medication Sig Dispense Refill     hydrocortisone (ANUSOL-HC) 2.5 % cream Place rectally 2 times daily as needed for hemorrhoids 30 g 1     hydrocortisone valerate (WEST-BELKYS) 0.2 % ointment Apply topically 2 times daily Behind ears, eyelids, and underarms 15 g 0     levothyroxine (SYNTHROID/LEVOTHROID) 75 MCG tablet Take 1 tablet (75 mcg) by mouth daily 90 tablet 3     PRENATAL VITAMINS PO Take 1 tablet by mouth daily.         tacrolimus (PROTOPIC) 0.1 % external ointment Apply topically 2 times daily 100 g 3     triamcinolone (KENALOG) 0.1 % ointment Apply topically 2 times daily Avoid using on face. 60 g 0     Review of Systems:  -As per  HPI  -Constitutional: Otherwise feeling well today, in usual state of health.  -HEENT: Patient denies nonhealing oral sores.  -Skin: As above in HPI. No additional skin concerns.    Physical exam:  Vitals: There were no vitals taken for this visit.  GEN: This is a well developed, well-nourished male in no acute distress, in a pleasant mood.    SKIN: Payne phototype: II   Examination of the back, abdomen, upper chest, face, scalp, neck, upper extremities, and lower legs was performed.   - Faint pink patches with overlying scale in the postauricular sulci    - Mild erythema and lichenification on the upper eyelids   - Few excoriated papules on the chest and back  - Faint erythematous papules on the L brow  - No other lesions of concern on areas examined.       Order for PATCH TESTS    [x] Outpatient  [] Inpatient: [place]..../ Bed ....      Skin Atopy (atopic dermatitis) [] Yes   [x] No  Rhinitis/Sinusitis:   [x] Yes   [] No  Allergic Asthma:   [] Yes   [x] No  Food Allergy:   [] Yes   [x] No  Leg ulcers:   [] Yes   [x] No  Hand eczema:   [] Yes   [x] No   Leading hand:   [] R   [] L       [] Ambidextrous                        Reason for tests (suspected allergy): Recurrent eczematous lesions, periorbital, retroauricular and axillae. DDx includes ACD to fragrance, chemicals (latex glove reaction? Rubber additives? Hair cosmetics?)  Known previous allergies: Latex?    Standardized panels  [x] Standard panel (40 tests)  [x] Preservatives & Antimicrobials (31 tests)  [x] Emulsifiers & Additives (25 tests)   [x] Perfumes/Flavours & Plants (25 tests)  [] Hairdresser panel (12 tests)  [] Rubber Chemicals (22 tests)  [] Plastics (26 tests)  [] Colorants/Dyes/Food additives (20 tests)  [] Metals (implants/dental) (24 tests)  [] Local anaesthetics/NSAIDs (13 tests)  [] Antibiotics & Antimycotics (14 tests)   [] Corticosteroids (15 tests)   [] Photopatch test (62 tests)   [] others: ...      [] Patient's own products:  ...    DO NOT test if chemical or biological identity is unknown!     always ask from patient the product information and safety sheets (MSDS)     **If patch testing is clear then we could consider repeating the environmental allergen prick test to evaluate atopic predisposition**    RESULTS & EVALUATION of PATCH TESTS    Patch test readings after     [x] 2 days, [] 3 days [x] 4 days, [] 5 days,    Applied patch tests with results (import here the list of patch tests):    Order documented by: Jaci Gutierrez [facility]     Physician:    Date/time of application:03/25/2019  Localization of application: Back without lesions      STANDARD Series         No Substance 2 days 4 days remarks   1 Anurag Mix [C] - -    2 Colophony - -    3  2-Mercaptobenzothiazole  - -     4 Methylisothiazolinone - -    5 Carba Mix - -    6 Thiuram Mix [A] - -    7 Bisphenol A Epoxy Resin - -    8 G-Ehyt-Ukgtxudqsor-Formaldehyde Resin - -    9 Mercapto Mix [A] - -    10 Black Rubber Mix- PPD [B] - -    11 Potassium Dichromate  -  -    12 Balsam of [place] (Myroxylon Pereirae Resin) - -    13 Nickel Sulphate Hexahydrate - -    14 Mixed Dialkyl Thiourea - -    15 Paraben Mix [B] - -    16 Methyldibromo Glutaronitrile - -    17 Fragrance Mix - -    18 2-Bromo-2-Nitropropane-1,3-Diol (Bronopol) - -    19 Lyral - -    20 Tixocortol-21- Pivalate - -    21 Diazolidiyl Urea (Germall II) - -    22 Methyl Methacrylate - -    23 Cobalt (II) Chloride Hexahydrate - -    24 Fragrance Mix II  - -    25 Compositae Mix - -    26 Benzoyl Peroxide - -    27 Bacitracin - -    28 Formaldehyde - -    29 Methylchloroisothiazolinone / Methylisothiazolinone - -    30 Corticosteroid Mix - -    31 Sodium Lauryl Sulfate - -    32 Lanolin Alcohol - -    33 Turpentine - -    34 Cetylstearylalcohol - -    35 Chlorhexidine Dicluconate - -    36 Budenoside - -    37 Imidazolidinyl Urea  - -    38 Ethyl-2 Cyanoacrylate - -    39 Quaternium 15 (Dowicil 200) - -    40 Decyl  Glucoside - -      EMULSIFIERS & ADDITIVES        No Substance 2 days 4 days remarks   41 Polyethylene Glycol-400 - -    42 Cocamidopropyl Betaine - -    43 Amerchol L101 - -    44 Propylene Glycol - -    45 Triethanolamine - -    46 Sorbitane Sesquiolate - -    47 Isopropylmyristate - -    48 Polysorbate 80  - -    49 Amidoamine   (Stearamidopropyl Dimethylamine) - -    50 Oleamidopropyl Dimethylamine - -    51 Lauryl Glucoside - -    52 Coconut Diethanolamide  - -    53 2-Hydroxy-4-Methoxy Benzophenone (Oxybenzone) - -    54 Benzophenone-4 (Sulisobenzon) - -    55 Propolis - -    56 Dexpanthenol - -    57 Carboxymethyl Cellulose Sodium - -    58 Abitol - -    59 Tert-Butylhydroquinone - -    60 Benzyl Salicylate - -     Antioxidant      61 Dodecyl Gallate - -    62 Butylhydroxyanisole (BHA) - -    63 Butylhydroxytoluene (BHT) - -    64 Di-Alpha-Tocopherol (Vit E) - -    65 Propyl Gallate - -      PERFUMES, FLAVORS & PLANTS         No Substance 2 days 4 days remarks   66 Benzyl Salicylate - -    67 Benzyl Cinnamate - -    68 Di-Limonene (Dipentene) - -    69 Cananga Odorata (Valerie Padilla) (I) - -    70 Lichen Acid Mix - -    71 Mentha Piperita Oil (Peppermint Oil) - -    72 Sesquiterpenelactone mix - -    73 Tea Tree Oil, Oxidized - -    74 Wood Tar Mix - -    75 Abietic Acid - -    76 Lavendula Angustifolia Oil (Lavender Oil) - -    77 Camphor  - -     Fragrance Mix I      78 Oakmoss Absolute - -    79 Eugenol - -    80 Geraniol - -    81 Hydroxycitronellal - -    82 Isoeugenol - -    83 Cinnamic Aldehyde - -    84 Cinnamic Alcohol  - -    85 Sorbitane Sesquioleate - -     Fragrance mix II      86 Citronellol - -    87 Alpha-Hexylcinnamic Aldehyde    - -    88 Citral - -    89 Farnesol - -    90 Coumarin - -       Results of patch tests:                         Interpretation:    - Negative                    A    = Allergic      (+) Erythema    TI   = Toxic/irritant   + E + Infiltration    RaP = Relevance at  Present     ++ E/I + Papulovesicle   Rpr  = Relevance Previously     +++ E/I/P + Blister     nR   = No Relevance    [] No relevant allergic reaction observed    [] Allergic reaction diagnosed against: see later    Interpretation/ remarks:   See later  [] Patient information given   [] [facility] information   [] [employer] information    ==> final Diagnosis:    >> Dermatitis: distribution evocative of contact dermatitis though minimal exam findings today. Also consider seborrheic dermatitis versus atopic dermatitis. Will consider fragrance, rubber allergies (notes latex allergy but this could be to the rubber additives) or airborne allergies.       Continue tacrolimus 0.1% ointment to the affected areas    Will plan for patch testing as below    Total IgE and specific IgE to latex blood tests = not elevated and therefore no signs for latex allergy      If patch testing is clear then we could consider repeating the environmental allergen prick test       ==> Treatment prescribed/Plan:  See later      These conclusions are made at the best of ones knowledge and belief  based on the provided evidence such as patients history and allergy test results and they can change over time or can be incomplete because of missing informations.    CC Kevin Zarco MD  [address] [place] [address] on close of this encounter.    Staff Involved:  Staff Physician Comments:  I saw and evaluated the patient with the resident and I agree with the assessment and plan as documented in the resident's note.    Gavin Gonsalves MD  Professor  Head of Dermato-Allergy Division  Department of Dermatology  [facility]  I spent a total of 30 min face to face with Karen Beverly during today's office visit. About 50% of the time was spent counseling the patient and/or coordinating care regarding their allergy.       Jayne Mendenhall MD/MPH  Internal Medicine/Dermatology  PGY-2  [phone]

## 2019-03-25 NOTE — LETTER
"3/25/2019       RE: Karen Beverly  [address]  [place] [address]     Dear Colleague,    Thank you for referring your patient, Karen Beverly, to the [facility] DERMATOLOGY at [facility]. Please see a copy of my visit note below.    [facility] Dermatology Note    Dermatology Problem List:  1. Dermatitis   -referred for patch testing (2/18/19)   - tacrolimus 0.1% ointment   - prior triamcinolone and hydrocortisone valerate without improvement    Encounter Date: Mar 25, 2019    CC:  Dermatitis, consideration of patch testing     History of Present Illness:  Ms. Karen Beverly is a 38 year old female who presents for consideration of patch testing for recurrent dermatitis involving her scalp, ears, eyelids, neck, axillae, and legs that has been going on since last March 2018. She reports redness around her eyelids and itching on her legs today. She has tried triamcinolone and hydrocortisone which didn't not provide relief. She was prescribed tacrolimus ointment at her last visit but hasn't used it yet. She notes allergies to augmentin and latex but denies a history of asthma or seasonal allergies. She switched her products to Dove from [employer]. She uses Tide laundry detergent but not the sensitive skin type. She does not color her hair. Her son has \"bad skin\" though and she thinks it might be eczema. She is taking him to the doctor soon to evaluate this. Finally, her family recently took a trip to [place] and her skin (and her son's) skin improved there.     She switched from Ivory to Dove products. She uses Tide detergent and puts her clothes on an extra rinse cycle. She also uses children's products for her kids clothing.     She saw allergy for environmental allergen skin testing which was negative 10/2017 but the probe was also not reactive. She notes an allergy to Latex gloves; she feels her hands get irritated after using them but denies having hives. "     She notes that she is still breastfeeding her son.     Past Medical History:   Patient Active Problem List   Diagnosis     Pregnancy related condition     Elevated TSH     Thyroid disease, antepartum     History of multiple miscarriages     Hashimoto's thyroiditis     Indication for care in labor or delivery     Labor and delivery, indication for care     Rhinoconjunctivitis     Chronic sinusitis, unspecified location     Gastroesophageal reflux disease without esophagitis     Non-functioning gallbladder     Eczema, unspecified type     Pruritic disorder     Past Medical History:   Diagnosis Date     Eczema      Endometriosis      Thyroid disease      Past Surgical History:   Procedure Laterality Date     DAVINCI PELVIC PROCEDURE  2/22/2012    Procedure:DAVINCI PELVIC PROCEDURE; DAVINCI DIAGNOSTIC PELVISCOPY WITH OMNIGUIDE C02 LASER, DIAGNOSTIC HYSTEROSCOPY, EXTENSIVE LYSIS EXCISION OF ENDOMETRIOSIS, BILATERAL URETEROLYSIS, D & C; Surgeon:JOHN KELLY; Location: OR     Social History:  Patient reports that  has never smoked. she has never used smokeless tobacco. She reports that she does not drink alcohol or use drugs.    Family History:  Family History   Problem Relation Age of Onset     Hypertension Mother      Cancer Other         liver cancer     Liver Cancer Maternal Grandmother 79     Hypertension Maternal Grandfather      Heart Disease Paternal Grandmother 62     Heart Disease Paternal Grandfather 60     Melanoma No family hx of      Skin Cancer No family hx of      Medications:  Current Outpatient Medications   Medication Sig Dispense Refill     hydrocortisone (ANUSOL-HC) 2.5 % cream Place rectally 2 times daily as needed for hemorrhoids 30 g 1     hydrocortisone valerate (WEST-BELKYS) 0.2 % ointment Apply topically 2 times daily Behind ears, eyelids, and underarms 15 g 0     levothyroxine (SYNTHROID/LEVOTHROID) 75 MCG tablet Take 1 tablet (75 mcg) by mouth daily 90 tablet 3     PRENATAL  VITAMINS PO Take 1 tablet by mouth daily.         tacrolimus (PROTOPIC) 0.1 % external ointment Apply topically 2 times daily 100 g 3     triamcinolone (KENALOG) 0.1 % ointment Apply topically 2 times daily Avoid using on face. 60 g 0     Review of Systems:  -As per HPI  -Constitutional: Otherwise feeling well today, in usual state of health.  -HEENT: Patient denies nonhealing oral sores.  -Skin: As above in HPI. No additional skin concerns.    Physical exam:  Vitals: There were no vitals taken for this visit.  GEN: This is a well developed, well-nourished male in no acute distress, in a pleasant mood.    SKIN: Payne phototype: II   Examination of the back, abdomen, upper chest, face, scalp, neck, upper extremities, and lower legs was performed.   - Faint pink patches with overlying scale in the postauricular sulci    - Mild erythema and lichenification on the upper eyelids   - Few excoriated papules on the chest and back  - Faint erythematous papules on the L brow  - No other lesions of concern on areas examined.       Order for PATCH TESTS    [x] Outpatient  [] Inpatient: [facility]..../ Bed ....      Skin Atopy (atopic dermatitis) [] Yes   [x] No  Rhinitis/Sinusitis:   [x] Yes   [] No  Allergic Asthma:   [] Yes   [x] No  Food Allergy:   [] Yes   [x] No  Leg ulcers:   [] Yes   [x] No  Hand eczema:   [] Yes   [x] No   Leading hand:   [] R   [] L       [] Ambidextrous                        Reason for tests (suspected allergy): Recurrent eczematous lesions, periorbital, retroauricular and axillae. DDx includes ACD to fragrance, chemicals (latex glove reaction? Rubber additives? Hair cosmetics?)  Known previous allergies: Latex?    Standardized panels  [x] Standard panel (40 tests)  [x] Preservatives & Antimicrobials (31 tests)  [x] Emulsifiers & Additives (25 tests)   [x] Perfumes/Flavours & Plants (25 tests)  [] Hairdresser panel (12 tests)  [] Rubber Chemicals (22 tests)  [] Plastics (26 tests)  []  Colorants/Dyes/Food additives (20 tests)  [] Metals (implants/dental) (24 tests)  [] Local anaesthetics/NSAIDs (13 tests)  [] Antibiotics & Antimycotics (14 tests)   [] Corticosteroids (15 tests)   [] Photopatch test (62 tests)   [] others: ...      [] Patient's own products: ...    DO NOT test if chemical or biological identity is unknown!     always ask from patient the product information and safety sheets (MSDS)     **If patch testing is clear then we could consider repeating the environmental allergen prick test to evaluate atopic predisposition**    RESULTS & EVALUATION of PATCH TESTS    Patch test readings after     [x] 2 days, [] 3 days [x] 4 days, [] 5 days,    Applied patch tests with results (import here the list of patch tests):    Order documented by: Jaci Gutierrez CMA     Physician:    Date/time of application:03/25/2019  Localization of application: Back without lesions      STANDARD Series         No Substance 2 days 4 days remarks   1 Anurag Mix [C] - -    2 Colophony - -    3  2-Mercaptobenzothiazole  - -     4 Methylisothiazolinone - -    5 Carba Mix - -    6 Thiuram Mix [A] - -    7 Bisphenol A Epoxy Resin - -    8 S-Urqu-Oiluqauwvrs-Formaldehyde Resin - -    9 Mercapto Mix [A] - -    10 Black Rubber Mix- PPD [B] - -    11 Potassium Dichromate  -  -    12 Balsam of [place] (Myroxylon Pereirae Resin) - -    13 Nickel Sulphate Hexahydrate - -    14 Mixed Dialkyl Thiourea - -    15 Paraben Mix [B] - -    16 Methyldibromo Glutaronitrile - -    17 Fragrance Mix - -    18 2-Bromo-2-Nitropropane-1,3-Diol (Bronopol) - -    19 Lyral - -    20 Tixocortol-21- Pivalate - -    21 Diazolidiyl Urea (Germall II) - -    22 Methyl Methacrylate - -    23 Cobalt (II) Chloride Hexahydrate - -    24 Fragrance Mix II  - -    25 Compositae Mix - -    26 Benzoyl Peroxide - -    27 Bacitracin - -    28 Formaldehyde - -    29 Methylchloroisothiazolinone / Methylisothiazolinone - -    30 Corticosteroid Mix - -     31 Sodium Lauryl Sulfate - -    32 Lanolin Alcohol - -    33 Turpentine - -    34 Cetylstearylalcohol - -    35 Chlorhexidine Dicluconate - -    36 Budenoside - -    37 Imidazolidinyl Urea  - -    38 Ethyl-2 Cyanoacrylate - -    39 Quaternium 15 (Dowicil 200) - -    40 Decyl Glucoside - -      EMULSIFIERS & ADDITIVES        No Substance 2 days 4 days remarks   41 Polyethylene Glycol-400 - -    42 Cocamidopropyl Betaine - -    43 Amerchol L101 - -    44 Propylene Glycol - -    45 Triethanolamine - -    46 Sorbitane Sesquiolate - -    47 Isopropylmyristate - -    48 Polysorbate 80  - -    49 Amidoamine   (Stearamidopropyl Dimethylamine) - -    50 Oleamidopropyl Dimethylamine - -    51 Lauryl Glucoside - -    52 Coconut Diethanolamide  - -    53 2-Hydroxy-4-Methoxy Benzophenone (Oxybenzone) - -    54 Benzophenone-4 (Sulisobenzon) - -    55 Propolis - -    56 Dexpanthenol - -    57 Carboxymethyl Cellulose Sodium - -    58 Abitol - -    59 Tert-Butylhydroquinone - -    60 Benzyl Salicylate - -     Antioxidant      61 Dodecyl Gallate - -    62 Butylhydroxyanisole (BHA) - -    63 Butylhydroxytoluene (BHT) - -    64 Di-Alpha-Tocopherol (Vit E) - -    65 Propyl Gallate - -      PERFUMES, FLAVORS & PLANTS         No Substance 2 days 4 days remarks   66 Benzyl Salicylate - -    67 Benzyl Cinnamate - -    68 Di-Limonene (Dipentene) - -    69 Cananga Odorata (Valerie Padilla) (I) - -    70 Lichen Acid Mix - -    71 Mentha Piperita Oil (Peppermint Oil) - -    72 Sesquiterpenelactone mix - -    73 Tea Tree Oil, Oxidized - -    74 Wood Tar Mix - -    75 Abietic Acid - -    76 Lavendula Angustifolia Oil (Lavender Oil) - -    77 Camphor  - -     Fragrance Mix I      78 Oakmoss Absolute - -    79 Eugenol - -    80 Geraniol - -    81 Hydroxycitronellal - -    82 Isoeugenol - -    83 Cinnamic Aldehyde - -    84 Cinnamic Alcohol  - -    85 Sorbitane Sesquioleate - -     Fragrance mix II      86 Citronellol - -    87 Alpha-Hexylcinnamic  Aldehyde    - -    88 Citral - -    89 Farnesol - -    90 Coumarin - -       Results of patch tests:                         Interpretation:    - Negative                    A    = Allergic      (+) Erythema    TI   = Toxic/irritant   + E + Infiltration    RaP = Relevance at Present     ++ E/I + Papulovesicle   Rpr  = Relevance Previously     +++ E/I/P + Blister     nR   = No Relevance    [] No relevant allergic reaction observed    [] Allergic reaction diagnosed against: see later    Interpretation/ remarks:   See later  [] Patient information given   [] ACSD information   [] SmartPractice information    ==> final Diagnosis:    >> Dermatitis: distribution evocative of contact dermatitis though minimal exam findings today. Also consider seborrheic dermatitis versus atopic dermatitis. Will consider fragrance, rubber allergies (notes latex allergy but this could be to the rubber additives) or airborne allergies.       Continue tacrolimus 0.1% ointment to the affected areas    Will plan for patch testing as below    Total IgE and specific IgE to latex blood tests = not elevated and therefore no signs for latex allergy      If patch testing is clear then we could consider repeating the environmental allergen prick test       ==> Treatment prescribed/Plan:  See later      These conclusions are made at the best of ones knowledge and belief  based on the provided evidence such as patients history and allergy test results and they can change over time or can be incomplete because of missing informations.    CC Kevin Zarco MD  [address] [place] [phone] on close of this encounter.    Staff Involved:  Staff Physician Comments:  I saw and evaluated the patient with the resident and I agree with the assessment and plan as documented in the resident's note.    Gavin Gonsalves MD  Professor  Head of Dermato-Allergy Division  Department of Dermatology  [facility]  I spent a total  of 30 min face to face with Karen Beverly during today's office visit. About 50% of the time was spent counseling the patient and/or coordinating care regarding their allergy.       Jayne Mendenhall MD/MPH  Internal Medicine/Dermatology  PGY-2  [phone]    Patient had 90 patch tests placed this visit.    Standard panel (40 tests)    Emulsifiers & Additives (25 tests)     Perfumes/Flavours & Plants (25 tests)

## 2019-03-27 ENCOUNTER — OFFICE VISIT (OUTPATIENT)
Dept: DERMATOLOGY | Facility: CLINIC | Age: 39
End: 2019-03-27

## 2019-03-27 DIAGNOSIS — L23.9 ALLERGIC CONTACT DERMATITIS, UNSPECIFIED TRIGGER: Primary | ICD-10-CM

## 2019-03-27 DIAGNOSIS — L20.9 ATOPIC DERMATITIS, UNSPECIFIED TYPE: ICD-10-CM

## 2019-03-27 LAB
IGE SERPL-ACNC: 8 KIU/L (ref 0–114)
LTX IGE QN: <0.1 KU(A)/L

## 2019-03-27 ASSESSMENT — PAIN SCALES - GENERAL: PAINLEVEL: NO PAIN (0)

## 2019-03-27 NOTE — PROGRESS NOTES
"[facility] Dermatology Note    Dermatology Problem List:  1. Dermatitis   -referred for patch testing (2/18/19)   - tacrolimus 0.1% ointment   - prior triamcinolone and hydrocortisone valerate without improvement    Encounter Date: Mar 27, 2019    CC:  Dermatitis, consideration of patch testing     History of Present Illness:  Ms. Karen Beverly is a 38 year old female who presents for consideration of patch testing for recurrent dermatitis involving her scalp, ears, eyelids, neck, axillae, and legs that has been going on since last March 2018. She reports redness around her eyelids and itching on her legs today. She has tried triamcinolone and hydrocortisone which didn't not provide relief. She was prescribed tacrolimus ointment at her last visit but hasn't used it yet. She notes allergies to augmentin and latex but denies a history of asthma or seasonal allergies. She switched her products to Dove from [employer]. She uses Tide laundry detergent but not the sensitive skin type. She does not color her hair. Her son has \"bad skin\" though and she thinks it might be eczema. She is taking him to the doctor soon to evaluate this. Finally, her family recently took a trip to [place] and her skin (and her son's) skin improved there.     She switched from Ivory to Dove products. She uses Tide detergent and puts her clothes on an extra rinse cycle. She also uses children's products for her kids clothing.     She saw allergy for environmental allergen skin testing which was negative 10/2017 but the probe was also not reactive. She notes an allergy to Latex gloves; she feels her hands get irritated after using them but denies having hives.     She notes that she is still breastfeeding her son.     Past Medical History:   Patient Active Problem List   Diagnosis     Pregnancy related condition     Elevated TSH     Thyroid disease, antepartum     History of multiple miscarriages     Hashimoto's thyroiditis     " Indication for care in labor or delivery     Labor and delivery, indication for care     Rhinoconjunctivitis     Chronic sinusitis, unspecified location     Gastroesophageal reflux disease without esophagitis     Non-functioning gallbladder     Eczema, unspecified type     Pruritic disorder     Past Medical History:   Diagnosis Date     Eczema      Endometriosis      Thyroid disease      Past Surgical History:   Procedure Laterality Date     DAVINCI PELVIC PROCEDURE  2/22/2012    Procedure:DAVINCI PELVIC PROCEDURE; DAVINCI DIAGNOSTIC PELVISCOPY WITH OMNIGUIDE C02 LASER, DIAGNOSTIC HYSTEROSCOPY, EXTENSIVE LYSIS EXCISION OF ENDOMETRIOSIS, BILATERAL URETEROLYSIS, D & C; Surgeon:JOHN KELLY; Location: OR     Social History:  Patient reports that  has never smoked. she has never used smokeless tobacco. She reports that she does not drink alcohol or use drugs.    Family History:  Family History   Problem Relation Age of Onset     Hypertension Mother      Cancer Other         liver cancer     Liver Cancer Maternal Grandmother 79     Hypertension Maternal Grandfather      Heart Disease Paternal Grandmother 62     Heart Disease Paternal Grandfather 60     Melanoma No family hx of      Skin Cancer No family hx of      Medications:  Current Outpatient Medications   Medication Sig Dispense Refill     levothyroxine (SYNTHROID/LEVOTHROID) 75 MCG tablet Take 1 tablet (75 mcg) by mouth daily 90 tablet 3     PRENATAL VITAMINS PO Take 1 tablet by mouth daily.         tacrolimus (PROTOPIC) 0.1 % external ointment Apply topically 2 times daily 100 g 3     triamcinolone (KENALOG) 0.1 % ointment Apply topically 2 times daily Avoid using on face. 60 g 0     hydrocortisone (ANUSOL-HC) 2.5 % cream Place rectally 2 times daily as needed for hemorrhoids (Patient not taking: Reported on 3/25/2019) 30 g 1     hydrocortisone valerate (WEST-BELKYS) 0.2 % ointment Apply topically 2 times daily Behind ears, eyelids, and underarms  (Patient not taking: Reported on 3/25/2019) 15 g 0     Review of Systems:  -As per HPI  -Constitutional: Otherwise feeling well today, in usual state of health.  -HEENT: Patient denies nonhealing oral sores.  -Skin: As above in HPI. No additional skin concerns.    Physical exam:  Vitals: There were no vitals taken for this visit.  GEN: This is a well developed, well-nourished male in no acute distress, in a pleasant mood.    SKIN: Payne phototype: II   Examination of the back, abdomen, upper chest, face, scalp, neck, upper extremities, and lower legs was performed.   - Faint pink patches with overlying scale in the postauricular sulci    - Mild erythema and lichenification on the upper eyelids   - Few excoriated papules on the chest and back  - Faint erythematous papules on the L brow  - No other lesions of concern on areas examined.       Order for PATCH TESTS    [x] Outpatient  [] Inpatient: [facility]..../ Bed ....      Skin Atopy (atopic dermatitis) [] Yes   [x] No  Rhinitis/Sinusitis:   [x] Yes   [] No  Allergic Asthma:   [] Yes   [x] No  Food Allergy:   [] Yes   [x] No  Leg ulcers:   [] Yes   [x] No  Hand eczema:   [] Yes   [x] No   Leading hand:   [] R   [] L       [] Ambidextrous                        Reason for tests (suspected allergy): Recurrent eczematous lesions, periorbital, retroauricular and axillae. DDx includes ACD to fragrance, chemicals (latex glove reaction? Rubber additives? Hair cosmetics?)  Known previous allergies: Latex?    Standardized panels  [x] Standard panel (40 tests)  [] Preservatives & Antimicrobials (31 tests)  [x] Emulsifiers & Additives (25 tests)   [x] Perfumes/Flavours & Plants (25 tests)  [] Hairdresser panel (12 tests)  [] Rubber Chemicals (22 tests)  [] Plastics (26 tests)  [] Colorants/Dyes/Food additives (20 tests)  [] Metals (implants/dental) (24 tests)  [] Local anaesthetics/NSAIDs (13 tests)  [] Antibiotics & Antimycotics (14 tests)   [] Corticosteroids (15 tests)   []  Photopatch test (62 tests)   [] others: ...      [] Patient's own products: ...    DO NOT test if chemical or biological identity is unknown!     always ask from patient the product information and safety sheets (MSDS)     **If patch testing is clear then we could consider repeating the environmental allergen prick test to evaluate atopic predisposition**    RESULTS & EVALUATION of PATCH TESTS    Patch test readings after     [x] 2 days, [] 3 days [x] 4 days, [] 5 days,    Applied patch tests with results (import here the list of patch tests):    Order documented by: Jaci Gutierrez CMA     Physician:    Date/time of application:03/25/2019  Localization of application: Back without lesions      STANDARD Series         No Substance 2 days 4 days remarks   1 Anurag Mix [C] - -    2 Colophony - -    3  2-Mercaptobenzothiazole  - -     4 Methylisothiazolinone - -    5 Carba Mix (+) -    6 Thiuram Mix [A] (+) -    7 Bisphenol A Epoxy Resin - -    8 E-Gcpw-Urnibnucovv-Formaldehyde Resin - -    9 Mercapto Mix [A] - -    10 Black Rubber Mix- PPD [B] - -    11 Potassium Dichromate  -  -    12 Balsam of [place] (Myroxylon Pereirae Resin) - -    13 Nickel Sulphate Hexahydrate (+) -    14 Mixed Dialkyl Thiourea - -    15 Paraben Mix [B] - -    16 Methyldibromo Glutaronitrile - -    17 Fragrance Mix - -    18 2-Bromo-2-Nitropropane-1,3-Diol (Bronopol) - -    19 Lyral - -    20 Tixocortol-21- Pivalate - -    21 Diazolidiyl Urea (Germall II) - -    22 Methyl Methacrylate - -    23 Cobalt (II) Chloride Hexahydrate (+) -    24 Fragrance Mix II  - -    25 Compositae Mix - -    26 Benzoyl Peroxide (+) -    27 Bacitracin (+) -    28 Formaldehyde - -    29 Methylchloroisothiazolinone / Methylisothiazolinone - -    30 Corticosteroid Mix - -    31 Sodium Lauryl Sulfate - -    32 Lanolin Alcohol - -    33 Turpentine - -    34 Cetylstearylalcohol (+) -    35 Chlorhexidine Dicluconate - -    36 Budenoside - -    37 Imidazolidinyl  Urea  - -    38 Ethyl-2 Cyanoacrylate - -    39 Quaternium 15 (Dowicil 200) - -    40 Decyl Glucoside - -      EMULSIFIERS & ADDITIVES        No Substance 2 days 4 days remarks   41 Polyethylene Glycol-400 - -    42 Cocamidopropyl Betaine - -    43 Amerchol L101 - -    44 Propylene Glycol - -    45 Triethanolamine - -    46 Sorbitane Sesquiolate - -    47 Isopropylmyristate - -    48 Polysorbate 80  - -    49 Amidoamine   (Stearamidopropyl Dimethylamine) - -    50 Oleamidopropyl Dimethylamine - -    51 Lauryl Glucoside - -    52 Coconut Diethanolamide  - -    53 2-Hydroxy-4-Methoxy Benzophenone (Oxybenzone) - -    54 Benzophenone-4 (Sulisobenzon) - -    55 Propolis - -    56 Dexpanthenol - -    57 Carboxymethyl Cellulose Sodium - -    58 Abitol - -    59 Tert-Butylhydroquinone - -    60 Benzyl Salicylate - -     Antioxidant      61 Dodecyl Gallate - -    62 Butylhydroxyanisole (BHA) - -    63 Butylhydroxytoluene (BHT) - -    64 Di-Alpha-Tocopherol (Vit E) - -    65 Propyl Gallate - -      PERFUMES, FLAVORS & PLANTS         No Substance 2 days 4 days remarks   66 Benzyl Salicylate - -    67 Benzyl Cinnamate - -    68 Di-Limonene (Dipentene) - -    69 Cananga Odorata (Valerie Padilla) (I) - -    70 Lichen Acid Mix - -    71 Mentha Piperita Oil (Peppermint Oil) - -    72 Sesquiterpenelactone mix - -    73 Tea Tree Oil, Oxidized - -    74 Wood Tar Mix - -    75 Abietic Acid - -    76 Lavendula Angustifolia Oil (Lavender Oil) - -    77 Camphor  - -     Fragrance Mix I      78 Oakmoss Absolute - -    79 Eugenol - -    80 Geraniol - -    81 Hydroxycitronellal - -    82 Isoeugenol - -    83 Cinnamic Aldehyde - -    84 Cinnamic Alcohol  - -    85 Sorbitane Sesquioleate - -     Fragrance mix II      86 Citronellol - -    87 Alpha-Hexylcinnamic Aldehyde    - -    88 Citral - -    89 Farnesol - -    90 Coumarin - -       Results of patch tests:                         Interpretation:    - Negative                    A    =  Allergic      (+) Erythema    TI   = Toxic/irritant   + E + Infiltration    RaP = Relevance at Present     ++ E/I + Papulovesicle   Rpr  = Relevance Previously     +++ E/I/P + Blister     nR   = No Relevance    [x] No relevant allergic reaction observed: no clear allergy yet, but some erythema in metals (Nickel and Cobalt), rubber chemicals and maybe emulsifier Cetylstearyl alcohol ==> see on Friday    [] Allergic reaction diagnosed against: see later    Interpretation/ remarks:   See later  [] Patient information given   [] ACSD information   [] SmartPractice information    ==> final Diagnosis:    >> Dermatitis: distribution evocative of contact dermatitis though minimal exam findings today. Also consider seborrheic dermatitis versus atopic dermatitis. Will consider fragrance, rubber allergies (notes latex allergy but this could be to the rubber additives) or airborne allergies.       Continue tacrolimus 0.1% ointment to the affected areas    Total IgE and specific IgE to latex blood tests = not elevated and therefore no signs for latex allergy      If patch testing is clear then we could consider repeating the environmental allergen prick test       ==> Treatment prescribed/Plan:  See later      These conclusions are made at the best of ones knowledge and belief  based on the provided evidence such as patients history and allergy test results and they can change over time or can be incomplete because of missing informations.    CC Kevin Zarco MD  [address] [place] [phone] on close of this encounter.

## 2019-03-27 NOTE — NURSING NOTE
Dermatology Rooming Note    Karen Beverly's goals for this visit include:   Chief Complaint   Patient presents with     Allergies     Karen is here for patch testingd day3     Jaci Gutierrez, CMA

## 2019-03-27 NOTE — PATIENT INSTRUCTIONS
Patch Testing Information  What are allergen patch tests?    The test is done to look for skin allergies that may be causing rashes and irritation.    A patch test is a way of identifying whether a substance has caused a delayed reaction with skin inflammation, such as contact eczema or delayed (after days) reactions to drugs.     We will use various types of test compounds, which may include common allergens you may come in contact with in daily life such as preservatives, fragrances or even drugs.     Most of the time we will use standardized, prefabricated test solutions. The choice of the substances and series tested will depend on your history of reactions. Sometimes we will test your own products as well.     In order to avoid severe toxic reactions we need detailed information or safety sheets for each of the test compounds.    The test panels are set up with a small amount of common substances that cause skin allergies. They are taped to your skin with a clear hypoallergenic bandage and reinforced hypoallergenic tape.    The substances are numbered, so it is easy to tell what is causing a skin reaction.  What do I need to do in preparation for the test?    Stop all systemic steroids 1 month prior to the testing.     Stop applying topical steroids to the test area one week prior to the test. It is to use them elsewhere throughout the testing process. If this is not possible then discuss options with the Allergy specialist.    Do not go sunbathing or tanning for one week prior to testing    It is okay to take antihistamines as normal.     Please wear dark colors the week of the test since we will write on you with a dark marker that may transfer and stain clothing or bedding.     Some medications can affect the reactions to allergens during the tests. Therefore reveal all the medications you take to the allergy team. The doctor will discuss the medications with you before the tests.     Eat how you normally  would.    Avoid the following:    You cannot get the test area wet during the entire test period. This means no bathing or swimming the entire test period.    No strenuous exercise that may cause sweating.    Do not scratch the test area, this can cause the allergen to spread and give us false positives.     Avoid exposure to UV irradiation. This means no tanning or UV treatment during the testing period.   What can I expect?    Patch testing is done over three appointments.   o The usual schedule is: Monday (Allergen patches are placed), Wednesday (Patches are removed and skin is examined by the MD), and Friday (Skin is examined by the MD)      If you are allergic, there will be an area of irritation where the test was placed.    Itching or burning at the test site might happen if you are allergic to the allergen.  Do not rub or scratch the test site since this may spread the allergen and possibly cause false positives. If itching or burning is not tolerable please contact the clinic.    The marker we draw on your back with ma take up to 5 weeks to wash off completely. Rubbing alcohol can help speed up this process.     Reactions can occur 1 to 2 weeks after the tests are applied. If this happens please take photos of the area and contact the clinic.  What should I do after the tests are placed?    Keep the area dry. No showering or getting the test area wet from the time we see you at your first visit until after your third appointment. If you get the test area wet you are washing off the test and we could get false negative reactions.    If you notice any of the test patches coming loose put on more tape to re-secure the test.    If the marker that is applied fades you can use a dark pen to eliceo around the panel sites.    Cover the test area when you are outside to avoid any sun exposure while the test is in place.     You can remove the tests only if there is a severe reaction (itch, pain, blistering). Please  report this to your doctor immediately. If you have to remove the tests please eliceo the locations of each test field with a grid so we can identify the allergen.  WHAT ARE THE POSSIBLE RISKS OF PATCH TESTS     If you are allergic to a compound tested you will develop a localized skin reaction similar to your previous reaction, this may take days to develop. These reactions include a formation of red, itchy skin lesions that could be about a centimeter with small vesicles or possibly even blisters. The lesions will fade over time, this may take weeks. The test might leave some skin pigmentation for a few months.     In rare cases a localized reaction to patch testing can become generalized.     The tests with your own products might have some risks because they are not standardized and unanticipated reactions could occur. We need as much information as possible to evaluate if your own products are safe to test and under what conditions. This has to be evaluated for each individual product.   Useful Contact Information    To contact your doctor you can either send a Sapiens message or call [phone]     Address  o [address] [place] [address]    If you develop any serious or adverse allergic reaction after office hours please seek immediate medical assistance at the nearest clinic, urgent care, or emergency room.

## 2019-03-27 NOTE — PROGRESS NOTES
Patient had 90 patch tests placed this visit.    Standard panel (40 tests)    Emulsifiers & Additives (25 tests)     Perfumes/Flavours & Plants (25 tests)

## 2019-03-27 NOTE — LETTER
"3/27/2019       RE: Karen Beverly  [address]  [place] [address]     Dear Colleague,    Thank you for referring your patient, Karen Beverly, to the [facility] DERMATOLOGY at [facility]. Please see a copy of my visit note below.    [facility] Dermatology Note    Dermatology Problem List:  1. Dermatitis   -referred for patch testing (2/18/19)   - tacrolimus 0.1% ointment   - prior triamcinolone and hydrocortisone valerate without improvement    Encounter Date: Mar 27, 2019    CC:  Dermatitis, consideration of patch testing     History of Present Illness:  Ms. Karen Beverly is a 38 year old female who presents for consideration of patch testing for recurrent dermatitis involving her scalp, ears, eyelids, neck, axillae, and legs that has been going on since last March 2018. She reports redness around her eyelids and itching on her legs today. She has tried triamcinolone and hydrocortisone which didn't not provide relief. She was prescribed tacrolimus ointment at her last visit but hasn't used it yet. She notes allergies to augmentin and latex but denies a history of asthma or seasonal allergies. She switched her products to Dove from [employer]. She uses Tide laundry detergent but not the sensitive skin type. She does not color her hair. Her son has \"bad skin\" though and she thinks it might be eczema. She is taking him to the doctor soon to evaluate this. Finally, her family recently took a trip to [place] and her skin (and her son's) skin improved there.     She switched from [employer] to Dove products. She uses Tide detergent and puts her clothes on an extra rinse cycle. She also uses children's products for her kids clothing.     She saw allergy for environmental allergen skin testing which was negative 10/2017 but the probe was also not reactive. She notes an allergy to Latex gloves; she feels her hands get irritated after using them but denies having hives. "     She notes that she is still breastfeeding her son.     Past Medical History:   Patient Active Problem List   Diagnosis     Pregnancy related condition     Elevated TSH     Thyroid disease, antepartum     History of multiple miscarriages     Hashimoto's thyroiditis     Indication for care in labor or delivery     Labor and delivery, indication for care     Rhinoconjunctivitis     Chronic sinusitis, unspecified location     Gastroesophageal reflux disease without esophagitis     Non-functioning gallbladder     Eczema, unspecified type     Pruritic disorder     Past Medical History:   Diagnosis Date     Eczema      Endometriosis      Thyroid disease      Past Surgical History:   Procedure Laterality Date     DAVINCI PELVIC PROCEDURE  2/22/2012    Procedure:DAVINCI PELVIC PROCEDURE; DAVINCI DIAGNOSTIC PELVISCOPY WITH OMNIGUIDE C02 LASER, DIAGNOSTIC HYSTEROSCOPY, EXTENSIVE LYSIS EXCISION OF ENDOMETRIOSIS, BILATERAL URETEROLYSIS, D & C; Surgeon:JOHN KELLY; Location: OR     Social History:  Patient reports that  has never smoked. she has never used smokeless tobacco. She reports that she does not drink alcohol or use drugs.    Family History:  Family History   Problem Relation Age of Onset     Hypertension Mother      Cancer Other         liver cancer     Liver Cancer Maternal Grandmother 79     Hypertension Maternal Grandfather      Heart Disease Paternal Grandmother 62     Heart Disease Paternal Grandfather 60     Melanoma No family hx of      Skin Cancer No family hx of      Medications:  Current Outpatient Medications   Medication Sig Dispense Refill     levothyroxine (SYNTHROID/LEVOTHROID) 75 MCG tablet Take 1 tablet (75 mcg) by mouth daily 90 tablet 3     PRENATAL VITAMINS PO Take 1 tablet by mouth daily.         tacrolimus (PROTOPIC) 0.1 % external ointment Apply topically 2 times daily 100 g 3     triamcinolone (KENALOG) 0.1 % ointment Apply topically 2 times daily Avoid using on face. 60 g 0      hydrocortisone (ANUSOL-HC) 2.5 % cream Place rectally 2 times daily as needed for hemorrhoids (Patient not taking: Reported on 3/25/2019) 30 g 1     hydrocortisone valerate (WEST-BELKYS) 0.2 % ointment Apply topically 2 times daily Behind ears, eyelids, and underarms (Patient not taking: Reported on 3/25/2019) 15 g 0     Review of Systems:  -As per HPI  -Constitutional: Otherwise feeling well today, in usual state of health.  -HEENT: Patient denies nonhealing oral sores.  -Skin: As above in HPI. No additional skin concerns.    Physical exam:  Vitals: There were no vitals taken for this visit.  GEN: This is a well developed, well-nourished male in no acute distress, in a pleasant mood.    SKIN: Payne phototype: II   Examination of the back, abdomen, upper chest, face, scalp, neck, upper extremities, and lower legs was performed.   - Faint pink patches with overlying scale in the postauricular sulci    - Mild erythema and lichenification on the upper eyelids   - Few excoriated papules on the chest and back  - Faint erythematous papules on the L brow  - No other lesions of concern on areas examined.       Order for PATCH TESTS    [x] Outpatient  [] Inpatient: [place]..../ Bed ....      Skin Atopy (atopic dermatitis) [] Yes   [x] No  Rhinitis/Sinusitis:   [x] Yes   [] No  Allergic Asthma:   [] Yes   [x] No  Food Allergy:   [] Yes   [x] No  Leg ulcers:   [] Yes   [x] No  Hand eczema:   [] Yes   [x] No   Leading hand:   [] R   [] L       [] Ambidextrous                        Reason for tests (suspected allergy): Recurrent eczematous lesions, periorbital, retroauricular and axillae. DDx includes ACD to fragrance, chemicals (latex glove reaction? Rubber additives? Hair cosmetics?)  Known previous allergies: Latex?    Standardized panels  [x] Standard panel (40 tests)  [] Preservatives & Antimicrobials (31 tests)  [x] Emulsifiers & Additives (25 tests)   [x] Perfumes/Flavours & Plants (25 tests)  [] Hairdresser panel (12  tests)  [] Rubber Chemicals (22 tests)  [] Plastics (26 tests)  [] Colorants/Dyes/Food additives (20 tests)  [] Metals (implants/dental) (24 tests)  [] Local anaesthetics/NSAIDs (13 tests)  [] Antibiotics & Antimycotics (14 tests)   [] Corticosteroids (15 tests)   [] Photopatch test (62 tests)   [] others: ...      [] Patient's own products: ...    DO NOT test if chemical or biological identity is unknown!     always ask from patient the product information and safety sheets (MSDS)     **If patch testing is clear then we could consider repeating the environmental allergen prick test to evaluate atopic predisposition**    RESULTS & EVALUATION of PATCH TESTS    Patch test readings after     [x] 2 days, [] 3 days [x] 4 days, [] 5 days,    Applied patch tests with results (import here the list of patch tests):    Order documented by: Jaci Gutierrez CMA     Physician:    Date/time of application:03/25/2019  Localization of application: Back without lesions      STANDARD Series         No Substance 2 days 4 days remarks   1 Anurag Mix [C] - -    2 Colophony - -    3  2-Mercaptobenzothiazole  - -     4 Methylisothiazolinone - -    5 Carba Mix (+) -    6 Thiuram Mix [A] (+) -    7 Bisphenol A Epoxy Resin - -    8 J-Fyez-Ziahkhzdqgn-Formaldehyde Resin - -    9 Mercapto Mix [A] - -    10 Black Rubber Mix- PPD [B] - -    11 Potassium Dichromate  -  -    12 Balsam of [place] (Myroxylon Pereirae Resin) - -    13 Nickel Sulphate Hexahydrate (+) -    14 Mixed Dialkyl Thiourea - -    15 Paraben Mix [B] - -    16 Methyldibromo Glutaronitrile - -    17 Fragrance Mix - -    18 2-Bromo-2-Nitropropane-1,3-Diol (Bronopol) - -    19 Lyral - -    20 Tixocortol-21- Pivalate - -    21 Diazolidiyl Urea (Germall II) - -    22 Methyl Methacrylate - -    23 Cobalt (II) Chloride Hexahydrate (+) -    24 Fragrance Mix II  - -    25 Compositae Mix - -    26 Benzoyl Peroxide (+) -    27 Bacitracin (+) -    28 Formaldehyde - -    29  Methylchloroisothiazolinone / Methylisothiazolinone - -    30 Corticosteroid Mix - -    31 Sodium Lauryl Sulfate - -    32 Lanolin Alcohol - -    33 Turpentine - -    34 Cetylstearylalcohol (+) -    35 Chlorhexidine Dicluconate - -    36 Budenoside - -    37 Imidazolidinyl Urea  - -    38 Ethyl-2 Cyanoacrylate - -    39 Quaternium 15 (Dowicil 200) - -    40 Decyl Glucoside - -      EMULSIFIERS & ADDITIVES        No Substance 2 days 4 days remarks   41 Polyethylene Glycol-400 - -    42 Cocamidopropyl Betaine - -    43 Amerchol L101 - -    44 Propylene Glycol - -    45 Triethanolamine - -    46 Sorbitane Sesquiolate - -    47 Isopropylmyristate - -    48 Polysorbate 80  - -    49 Amidoamine   (Stearamidopropyl Dimethylamine) - -    50 Oleamidopropyl Dimethylamine - -    51 Lauryl Glucoside - -    52 Coconut Diethanolamide  - -    53 2-Hydroxy-4-Methoxy Benzophenone (Oxybenzone) - -    54 Benzophenone-4 (Sulisobenzon) - -    55 Propolis - -    56 Dexpanthenol - -    57 Carboxymethyl Cellulose Sodium - -    58 Abitol - -    59 Tert-Butylhydroquinone - -    60 Benzyl Salicylate - -     Antioxidant      61 Dodecyl Gallate - -    62 Butylhydroxyanisole (BHA) - -    63 Butylhydroxytoluene (BHT) - -    64 Di-Alpha-Tocopherol (Vit E) - -    65 Propyl Gallate - -      PERFUMES, FLAVORS & PLANTS         No Substance 2 days 4 days remarks   66 Benzyl Salicylate - -    67 Benzyl Cinnamate - -    68 Di-Limonene (Dipentene) - -    69 Cananga Odorata (Valerie Padilla) (I) - -    70 Lichen Acid Mix - -    71 Mentha Piperita Oil (Peppermint Oil) - -    72 Sesquiterpenelactone mix - -    73 Tea Tree Oil, Oxidized - -    74 Wood Tar Mix - -    75 Abietic Acid - -    76 Lavendula Angustifolia Oil (Lavender Oil) - -    77 Camphor  - -     Fragrance Mix I      78 Oakmoss Absolute - -    79 Eugenol - -    80 Geraniol - -    81 Hydroxycitronellal - -    82 Isoeugenol - -    83 Cinnamic Aldehyde - -    84 Cinnamic Alcohol  - -    85 Sorbitane  Sesquioleate - -     Fragrance mix II      86 Citronellol - -    87 Alpha-Hexylcinnamic Aldehyde    - -    88 Citral - -    89 Farnesol - -    90 Coumarin - -       Results of patch tests:                         Interpretation:    - Negative                    A    = Allergic      (+) Erythema    TI   = Toxic/irritant   + E + Infiltration    RaP = Relevance at Present     ++ E/I + Papulovesicle   Rpr  = Relevance Previously     +++ E/I/P + Blister     nR   = No Relevance    [x] No relevant allergic reaction observed: no clear allergy yet, but some erythema in metals (Nickel and Cobalt), rubber chemicals and maybe emulsifier Cetylstearyl alcohol ==> see on Friday    [] Allergic reaction diagnosed against: see later    Interpretation/ remarks:   See later  [] Patient information given   [] [facility] information   [] [employer] information    ==> final Diagnosis:    >> Dermatitis: distribution evocative of contact dermatitis though minimal exam findings today. Also consider seborrheic dermatitis versus atopic dermatitis. Will consider fragrance, rubber allergies (notes latex allergy but this could be to the rubber additives) or airborne allergies.       Continue tacrolimus 0.1% ointment to the affected areas    Total IgE and specific IgE to latex blood tests = not elevated and therefore no signs for latex allergy      If patch testing is clear then we could consider repeating the environmental allergen prick test     ==> Treatment prescribed/Plan:     These conclusions are made at the best of ones knowledge and belief  based on the provided evidence such as patients history and allergy test results and they can change over time or can be incomplete because of missing informations.    CC Kevin Zarco MD  [address] [place] [phone] on close of this encounter.    Again, thank you for allowing me to participate in the care of your patient.      Sincerely,  Gavin Gonsalves MD

## 2019-03-29 ENCOUNTER — OFFICE VISIT (OUTPATIENT)
Dept: DERMATOLOGY | Facility: CLINIC | Age: 39
End: 2019-03-29

## 2019-03-29 DIAGNOSIS — L24.9 IRRITANT DERMATITIS: Primary | ICD-10-CM

## 2019-03-29 DIAGNOSIS — L21.9 DERMATITIS, SEBORRHEIC: ICD-10-CM

## 2019-03-29 RX ORDER — KETOCONAZOLE 20 MG/ML
SHAMPOO TOPICAL EVERY OTHER DAY
Qty: 120 ML | Refills: 3 | Status: SHIPPED | OUTPATIENT
Start: 2019-03-29 | End: 2019-06-27

## 2019-03-29 ASSESSMENT — PAIN SCALES - GENERAL: PAINLEVEL: NO PAIN (0)

## 2019-03-29 NOTE — LETTER
"3/29/2019       RE: Karen Beverly  [address]  [place] [address]     Dear Colleague,    Thank you for referring your patient, Karen Beverly, to the [facility]ER[facility]OLOGY at [facility]. Please see a copy of my visit note below.    [facility] Dermatology Note    Dermatology Problem List:  1. Dermatitis   -referred for patch testing (2/18/19)   - tacrolimus 0.1% ointment   - prior triamcinolone and hydrocortisone valerate without improvement    Encounter Date: Mar 29, 2019    CC:  Dermatitis, consideration of patch testing     History of Present Illness:  Ms. Karen Beverly is a 38 year old female who presents for consideration of patch testing for recurrent dermatitis involving her scalp, ears, eyelids, neck, axillae, and legs that has been going on since last March 2018. She reports redness around her eyelids and itching on her legs today. She has tried triamcinolone and hydrocortisone which didn't not provide relief. She was prescribed tacrolimus ointment at her last visit but hasn't used it yet. She notes allergies to augmentin and latex but denies a history of asthma or seasonal allergies. She switched her products to Dove from [employer]. She uses Tide laundry detergent but not the sensitive skin type. She does not color her hair. Her son has \"bad skin\" though and she thinks it might be eczema. She is taking him to the doctor soon to evaluate this. Finally, her family recently took a trip to [place] and her skin (and her son's) skin improved there.     She switched from Ivory to Dove products. She uses Tide detergent and puts her clothes on an extra rinse cycle. She also uses children's products for her kids clothing.     She saw allergy for environmental allergen skin testing which was negative 10/2017 but the probe was also not reactive. She notes an allergy to Latex gloves; she feels her hands get irritated after using them but denies having hives. "     She notes that she is still breastfeeding her son.     Past Medical History:   Patient Active Problem List   Diagnosis     Pregnancy related condition     Elevated TSH     Thyroid disease, antepartum     History of multiple miscarriages     Hashimoto's thyroiditis     Indication for care in labor or delivery     Labor and delivery, indication for care     Rhinoconjunctivitis     Chronic sinusitis, unspecified location     Gastroesophageal reflux disease without esophagitis     Non-functioning gallbladder     Eczema, unspecified type     Pruritic disorder     Past Medical History:   Diagnosis Date     Eczema      Endometriosis      Thyroid disease      Past Surgical History:   Procedure Laterality Date     DAVINCI PELVIC PROCEDURE  2/22/2012    Procedure:DAVINCI PELVIC PROCEDURE; DAVINCI DIAGNOSTIC PELVISCOPY WITH OMNIGUIDE C02 LASER, DIAGNOSTIC HYSTEROSCOPY, EXTENSIVE LYSIS EXCISION OF ENDOMETRIOSIS, BILATERAL URETEROLYSIS, D & C; Surgeon:JOHN KELLY; Location: OR     Social History:  Patient reports that  has never smoked. she has never used smokeless tobacco. She reports that she does not drink alcohol or use drugs.    Family History:  Family History   Problem Relation Age of Onset     Hypertension Mother      Cancer Other         liver cancer     Liver Cancer Maternal Grandmother 79     Hypertension Maternal Grandfather      Heart Disease Paternal Grandmother 62     Heart Disease Paternal Grandfather 60     Melanoma No family hx of      Skin Cancer No family hx of      Medications:  Current Outpatient Medications   Medication Sig Dispense Refill     hydrocortisone (ANUSOL-HC) 2.5 % cream Place rectally 2 times daily as needed for hemorrhoids (Patient not taking: Reported on 3/25/2019) 30 g 1     hydrocortisone valerate (WEST-BELKYS) 0.2 % ointment Apply topically 2 times daily Behind ears, eyelids, and underarms (Patient not taking: Reported on 3/25/2019) 15 g 0     levothyroxine  (SYNTHROID/LEVOTHROID) 75 MCG tablet Take 1 tablet (75 mcg) by mouth daily 90 tablet 3     PRENATAL VITAMINS PO Take 1 tablet by mouth daily.         tacrolimus (PROTOPIC) 0.1 % external ointment Apply topically 2 times daily 100 g 3     triamcinolone (KENALOG) 0.1 % ointment Apply topically 2 times daily Avoid using on face. 60 g 0     Review of Systems:  -As per HPI  -Constitutional: Otherwise feeling well today, in usual state of health.  -HEENT: Patient denies nonhealing oral sores.  -Skin: As above in HPI. No additional skin concerns.    Physical exam:  Vitals: There were no vitals taken for this visit.  GEN: This is a well developed, well-nourished male in no acute distress, in a pleasant mood.    SKIN: Payne phototype: II   Examination of the back, abdomen, upper chest, face, scalp, neck, upper extremities, and lower legs was performed.   - Faint pink patches with overlying scale in the postauricular sulci    - Mild erythema and lichenification on the upper eyelids   - Few excoriated papules on the chest and back  - Faint erythematous papules on the L brow  - No other lesions of concern on areas examined.       Order for PATCH TESTS    [x] Outpatient  [] Inpatient: [place]..../ Bed ....      Skin Atopy (atopic dermatitis) [] Yes   [x] No  Rhinitis/Sinusitis:   [x] Yes   [] No  Allergic Asthma:   [] Yes   [x] No  Food Allergy:   [] Yes   [x] No  Leg ulcers:   [] Yes   [x] No  Hand eczema:   [] Yes   [x] No   Leading hand:   [] R   [] L       [] Ambidextrous                        Reason for tests (suspected allergy): Recurrent eczematous lesions, periorbital, retroauricular and axillae. DDx includes ACD to fragrance, chemicals (latex glove reaction? Rubber additives? Hair cosmetics?)  Known previous allergies: Latex?    Standardized panels  [x] Standard panel (40 tests)  [] Preservatives & Antimicrobials (31 tests)  [x] Emulsifiers & Additives (25 tests)   [x] Perfumes/Flavours & Plants (25 tests)  []  Hairdresser panel (12 tests)  [] Rubber Chemicals (22 tests)  [] Plastics (26 tests)  [] Colorants/Dyes/Food additives (20 tests)  [] Metals (implants/dental) (24 tests)  [] Local anaesthetics/NSAIDs (13 tests)  [] Antibiotics & Antimycotics (14 tests)   [] Corticosteroids (15 tests)   [] Photopatch test (62 tests)   [] others: ...      [] Patient's own products: ...    DO NOT test if chemical or biological identity is unknown!     always ask from patient the product information and safety sheets (MSDS)     **If patch testing is clear then we could consider repeating the environmental allergen prick test to evaluate atopic predisposition**    RESULTS & EVALUATION of PATCH TESTS    Patch test readings after     [x] 2 days, [] 3 days [x] 4 days, [] 5 days,    Applied patch tests with results (import here the list of patch tests):    Order documented by: Jaci Gutierrez CMA     Physician:    Date/time of application:03/25/2019  Localization of application: Back without lesions      STANDARD Series         No Substance 2 days 4 days remarks   1 Anurag Mix [C] - -    2 Colophony - -    3  2-Mercaptobenzothiazole  - -     4 Methylisothiazolinone - -    5 Carba Mix (+) -    6 Thiuram Mix [A] (+) + A few papules without much erythema   7 Bisphenol A Epoxy Resin - -    8 K-Wwvg-Mdouevagudq-Formaldehyde Resin - -    9 Mercapto Mix [A] - -    10 Black Rubber Mix- PPD [B] - -    11 Potassium Dichromate  -  -    12 Balsam of [place] (Myroxylon Pereirae Resin) - -    13 Nickel Sulphate Hexahydrate (+) +/++    14 Mixed Dialkyl Thiourea - -    15 Paraben Mix [B] - -    16 Methyldibromo Glutaronitrile - -    17 Fragrance Mix - -    18 2-Bromo-2-Nitropropane-1,3-Diol (Bronopol) - -    19 Lyral - -    20 Tixocortol-21- Pivalate - -    21 Diazolidiyl Urea (Germall II) - -    22 Methyl Methacrylate - -    23 Cobalt (II) Chloride Hexahydrate (+) -    24 Fragrance Mix II  - -    25 Compositae Mix - -    26 Benzoyl Peroxide (+) -     27 Bacitracin (+) -    28 Formaldehyde - -    29 Methylchloroisothiazolinone / Methylisothiazolinone - -    30 Corticosteroid Mix - -    31 Sodium Lauryl Sulfate - -    32 Lanolin Alcohol - -    33 Turpentine - -    34 Cetylstearylalcohol (+) -    35 Chlorhexidine Dicluconate - -    36 Budenoside - -    37 Imidazolidinyl Urea  - -    38 Ethyl-2 Cyanoacrylate - -    39 Quaternium 15 (Dowicil 200) - -    40 Decyl Glucoside - -      EMULSIFIERS & ADDITIVES        No Substance 2 days 4 days remarks   41 Polyethylene Glycol-400 - -    42 Cocamidopropyl Betaine - -    43 Amerchol L101 - -    44 Propylene Glycol - -    45 Triethanolamine - -    46 Sorbitane Sesquiolate - -    47 Isopropylmyristate - -    48 Polysorbate 80  - -    49 Amidoamine   (Stearamidopropyl Dimethylamine) - -    50 Oleamidopropyl Dimethylamine - -    51 Lauryl Glucoside - -    52 Coconut Diethanolamide  - -    53 2-Hydroxy-4-Methoxy Benzophenone (Oxybenzone) - -    54 Benzophenone-4 (Sulisobenzon) - -    55 Propolis - -    56 Dexpanthenol - -    57 Carboxymethyl Cellulose Sodium - -    58 Abitol - -    59 Tert-Butylhydroquinone - -    60 Benzyl Salicylate - -     Antioxidant      61 Dodecyl Gallate - -    62 Butylhydroxyanisole (BHA) - -    63 Butylhydroxytoluene (BHT) - -    64 Di-Alpha-Tocopherol (Vit E) - -    65 Propyl Gallate - -      PERFUMES, FLAVORS & PLANTS         No Substance 2 days 4 days remarks   66 Benzyl Salicylate - -    67 Benzyl Cinnamate - -    68 Di-Limonene (Dipentene) - -    69 Cananga Odorata (Ylang Ylang) (I) - -    70 Lichen Acid Mix - -    71 Mentha Piperita Oil (Peppermint Oil) - -    72 Sesquiterpenelactone mix - -    73 Tea Tree Oil, Oxidized - -    74 Wood Tar Mix - -    75 Abietic Acid - -    76 Lavendula Angustifolia Oil (Lavender Oil) - -    77 Camphor  - -     Fragrance Mix I      78 [place] Absolute - -    79 Eugenol - -    80 Geraniol - -    81 Hydroxycitronellal - -    82 Isoeugenol - -    83 Cinnamic Aldehyde  - -    84 Cinnamic Alcohol  - -    85 Sorbitane Sesquioleate - -     Fragrance mix II      86 Citronellol - -    87 Alpha-Hexylcinnamic Aldehyde    - -    88 Citral - -    89 Farnesol - -    90 Coumarin - -       Results of patch tests:                         Interpretation:    - Negative                    A    = Allergic      (+) Erythema    TI   = Toxic/irritant   + E + Infiltration    RaP = Relevance at Present     ++ E/I + Papulovesicle   Rpr  = Relevance Previously     +++ E/I/P + Blister     nR   = No Relevance    [x] Allergic reaction diagnosed against: Thiuram Mix [A] +, Nickel +/++    Atopy screen prick test (03/29/2019)    No Substance Readings (15min) Evaluation   POS Histamine 1mg/ml ++    NEG NaCl 0.9% -      Atopy Screen  No Substance Readings (15min) Evaluation   1 Alternaria alternata (tenuis)  -    2 Cladosporium herbarum -    3 Aspergillus fumigatus -    4 Penicillium notatum -    5 Dermatophagoides pteronyssinus -    6 Dermatophagoides farinae -    7 Dog epithelium (canis spp) -    8 Cat hair (domo catus) -    9 Cockroach   (Blatella americana & germanica) -    10 Grass mix midwest   (Carola, Orchard, Redtop, Schuyler) -    11 Kaleb grass (sorghum halepense) -    12 Weed mix   (common Cocklebur, Lamb s quarters, rough redroot Pigweed, Dock/Sorrel) -    13 Mugwort (artemisia vulgare) -    14 Ragweed giant/short (ambrosia spp) -    15 English Plantain (plantago lanceolata) -    16 Tree mix 1 (Pecan, Maple BHR, Oak RVW, american [place], black Lawn) NA Not in stock   17 Red cedar (juniperus virginia) -    18 Tree mix 2   (white Kaden, river/red Birch, black [place], common [place], american Elm) NA Not in stock    19 Box elder/Maple mix (acer spp) -    20 Given shagbark (carya ovata) -       -      In prick tests no signs for immediate sensitizations and therefore no signs for atopic predisposition    Interpretation/ remarks:     Patient has slight reactions to the rubber chemical, Thiuram  Mix, but not very strong. However, this could explain the prior reactions to rubber products. There is no sign for Latex allergy (spec IgE Latex negatives), but there is an indication for slight contact sensibilization against rubber chemicals.  by an atopic predisposition.        Total IgE and specific IgE to latex blood tests = not elevated and therefore no signs for latex allergy      Prick test atopy screen negative ==> no atopic predisposition      ==> final Diagnosis:    >> chronic Dermatitis on face and axillae DDx irritant vs seborrhoic dermatitis    no signs for strong and relevant contact sensibilisation (contact allergy to Nickel and Thiuram --> but  probably not relevant for the facial dermatitis)    No signs for extrinsic atopic predisposition (intrinsic atopy cannot be excluded)    >> unlikely Latex allergy, probably a slight delayed type sensitization to rubber chemical Thiuram    removed latex from allergy list, but patient should avoid rubber gloves moving forward.      These conclusions are made at the best of ones knowledge and belief  based on the provided evidence such as patients history and allergy test results and they can change over time or can be incomplete because of missing informations.    ==> Treatment prescribed/Plan:    Continue tacrolimus 0.1% ointment to the affected areas    Ketoconazole shampoo 3xweekly and in between Selsun blue Shampoo    Use unscented facial creams, body creams and body washes (e.g. Vanicream products or Free&clear)       CC Kevin Zarco MD  [address] [place] [id number] on close of this encounter.    I spent a total of 30 min face to face with Karen Beverly during today's office visit. About 50% of the time was spent counseling the patient and/or coordinating care regarding their allergy.              Control Tests  No Substance Readings (15min) Evaluation   POS Histamine 1mg/ml -    NEG NaCl 0.9% -      Atopy Screen  No Substance Readings  (15min) Evaluation   1 Alternaria alternata (tenuis)  -    2 Cladosporium herbarum -    3 Aspergillus fumigatus -    4 Penicillium notatum -    5 Dermatophagoides pteronyssinus -    6 Dermatophagoides farinae -    7 Dog epithelium (canis spp) -    8 Cat hair (domo catus) -    9 Cockroach   (Blatella americana & germanica) -    10 Grass mix midwest   (Carola, Orchard, Redtop, Schuyler) -    11 Kaleb grass (sorghum halepense) -    12 Weed mix   (common Cocklebur, Lamb s quarters, rough redroot Pigweed, Dock/Sorrel) -    13 Mugwort (artemisia vulgare) -    14 Ragweed giant/short (ambrosia spp) -    15 English Plantain (plantago lanceolata) -    16 Tree mix 1 (Pecan, Maple BHR, Oak RVW, american [place], black [place]) NA Not in stock   17 Red cedar (juniperus virginia) -    18 Tree mix 2   (white Kaden, river/red Birch, black [place], common [place], american Elm) NA Not in stock    19 Box elder/Maple mix (acer spp) -    20 Pleasants shagbark (carya ovata) -       -        Gavin Gonsalves MD

## 2019-03-29 NOTE — PROGRESS NOTES
Control Tests  No Substance Readings (15min) Evaluation   POS Histamine 1mg/ml -    NEG NaCl 0.9% -      Atopy Screen  No Substance Readings (15min) Evaluation   1 Alternaria alternata (tenuis)  -    2 Cladosporium herbarum -    3 Aspergillus fumigatus -    4 Penicillium notatum -    5 Dermatophagoides pteronyssinus -    6 Dermatophagoides farinae -    7 Dog epithelium (canis spp) -    8 Cat hair (domo catus) -    9 Cockroach   (Blatella americana & germanica) -    10 Grass mix [facility]   (Carola, Orchard, Redtop, [place]uyler) -    11 Kaleb grass (sorghum halepense) -    12 Weed mix   (common Cocklebur, Lamb s quarters, rough redroot Pigweed, Dock/Sorrel) -    13 Mugwort (artemisia vulgare) -    14 Ragweed giant/short (ambrosia spp) -    15 English Plantain (plantago lanceolata) -    16 Tree mix 1 (Pecan, Maple BHR, Oak RVW, american [place], black [place]) NA Not in stock   17 Red cedar (juniperus virginia) -    18 Tree mix 2   (white Kaden, river/red Birch, black [place], common [place], american Elm) NA Not in stock    19 Box elder/Maple mix (acer spp) -    20 Cherry shagbark (carya ovata) -       -

## 2019-03-29 NOTE — NURSING NOTE
Dermatology Rooming Note    Karen Beverly's goals for this visit include:   Chief Complaint   Patient presents with     Allergies     Lit is here for allergy testing day 5     Jaci Gutierrez, CMA

## 2019-03-29 NOTE — LETTER
April 19, 2019      Karen Beverly  [address]  [place] [address]              Dear Karen,    Thank you for referring your patient, Karen Beverly, for allergy testing. This patient was seen on 03/29/2019 for Patch testing. The below compounds were tested. Please see below for my interpretation and a list of tests preformed.     Positive Reactions:    [x] Allergic reaction diagnosed against: Thiuram Mix [A] +, Nickel +/++    Interpretation/Remarks  Patient has slight reactions to the rubber chemical, Thiuram Mix, but not very strong. However, this could explain the prior reactions to rubber products. There is no sign for Latex allergy (spec IgE Latex negatives), but there is an indication for slight contact sensibilization against rubber chemicals.  by an atopic predisposition.        Total IgE and specific IgE to latex blood tests = not elevated and therefore no signs for latex allergy      Prick test atopy screen negative ==> no atopic predisposition        Final Diagnosis    >> chronic Dermatitis on face and axillae DDx irritant vs seborrhoic dermatitis    no signs for strong and relevant contact sensibilisation (contact allergy to Nickel and Thiuram --> but  probably not relevant for the facial dermatitis)    No signs for extrinsic atopic predisposition (intrinsic atopy cannot be excluded)    >> unlikely Latex allergy, probably a slight delayed type sensitization to rubber chemical Thiuram    removed latex from allergy list, but patient should avoid rubber gloves moving forward.      Treatment/Plan      Continue tacrolimus 0.1% ointment to the affected areas    Ketoconazole shampoo 3xweekly and in between Selsun blue Shampoo    Use unscented facial creams, body creams and body washes (e.g. Vanicream products or Free&clear)    Tested Products    (Standard) Anurag Mix [C], Colophony, 2-Mercaptobenzothiazole, Methylisothiazolinone, Carba Mix, Thiuram Mix [A], Bisphenol A Epoxy Resin, [id number]  Formaldehyde Resin, Mercapto Mix [A], Black Rubber Mix- PPD [B], Potassium Dichromate, Balsam of [place] (Myroxylon Pereirae Resin), Nickel Sulphate Hexahydrate, Mixed Dialkyl Thiourea, Paraben Mix [B], Methyldibromo Glutaro-Nitrile, Fragrance mix,  2-Bromo-2-nitropropane-1,3-diol (Bronopol), Lyral, Tixocortol-21-Pivalate, Diazolidiyl Urea (Germall II), Methyl Methacrylate, Cobalt (II) Chloride Hexahydrate, Fragrance Mix II, Compositae Mix, Benzoyl Peroxide, Bacitracin, Formaldehyde, Methylchloroisothiazolinone / Methylisothiazolinone, Corticoseteroid Mix, Sodium Lauryl Sulfate, Lanolin Alcohol, Oil of Turpentine, Cetylstearylalcohol, Chlorhexidine Dicluconate, Budenoside, Imidazolidinyl Urea, Ethyl Cyanoacrylate, Quaternium 15, Decyl Glucoside    (Emulsifiers) Polyethylene Glycol-400, Cocamidopropyl Betaine, Amerchol L101, Propylene Glycol, Triethanolamine, Sorbitane Sesquiolate, Isopropylmyristate, Polysorbate 80, Amidoamine (Stearamidopropyl Dimethylamine), Oleamidopropyl Dimethylamine, Lauryl Glucoside, Coconut Diethanolamide, 2-Hydroxy-4-Methoxybenzo-Phenone (Oxybenzone), Benzophenone-4 (Sulisobenzon), Propolis, Dexpanthenol, Carboxymethyl Cellulose Sodium, Abitol, Tert- Butylhydroquinone, Benzyl Salicylate, Dodecyl Gallate, Butylhydroxyanisole (BHA), Butylhydroxytoluene (BHT), Di-Alpha-Tocopherol, Propyl Gallate    (Perfume, Flavors, and Plants) Benzyl Salicylate, Benzyl Cinnamate, Dl-Limonene (Dipentene), Cananga Odorata ((Ylang Ylang)(I)), Lichen Acid Mix, Mentha Piperita Oil (Peppermint Oil), Sesquiterpene Lactone Mix, Tea Tree Oil- Oxidized, Wood Tar Mix, Abietic Acid, Lavendula Angustifolia Oil (Lavender Oil), Camphor, Oak [place]alobos Absolute, Eugenol, Geraniol, Hydroxycitronellal, Isoeugenol, Cinnamic Aldehyde, Cinnamic Alcohol, Sorbitane Sesquioleate, Citronellol, Alpha-Hexylcinnamic Aldehyde, Citral, Farnesol, Coumarin      These conclusions are made at the best of ones knowledge and belief  based on the  provided evidence such as patients history and allergy test results and they can change over time or can be incomplete because of missing informations.       If you have any questions please call [phone]      Sincerely,    Gavin Gonsalves MD

## 2019-03-29 NOTE — PATIENT INSTRUCTIONS
STANDARD Series         No Substance 2 days 4 days remarks   1 Anurag Mix [C] - -    2 Colophony - -    3  2-Mercaptobenzothiazole  - -     4 Methylisothiazolinone - -    5 Carba Mix (+) -    6 Thiuram Mix [A] (+) + A few papules without much erythema   7 Bisphenol A Epoxy Resin - -    8 R-Vcbe-Ioaablzwtom-Formaldehyde Resin - -    9 Mercapto Mix [A] - -    10 Black Rubber Mix- PPD [B] - -    11 Potassium Dichromate  -  -    12 Balsam of [place] (Myroxylon Pereirae Resin) - -    13 Nickel Sulphate Hexahydrate (+) +/++    14 Mixed Dialkyl Thiourea - -    15 Paraben Mix [B] - -    16 Methyldibromo Glutaronitrile - -    17 Fragrance Mix - -    18 2-Bromo-2-Nitropropane-1,3-Diol (Bronopol) - -    19 Lyral - -    20 Tixocortol-21- Pivalate - -    21 Diazolidiyl Urea (Germall II) - -    22 Methyl Methacrylate - -    23 Cobalt (II) Chloride Hexahydrate (+) -    24 Fragrance Mix II  - -    25 Compositae Mix - -    26 Benzoyl Peroxide (+) -    27 Bacitracin (+) -    28 Formaldehyde - -    29 Methylchloroisothiazolinone / Methylisothiazolinone - -    30 Corticosteroid Mix - -    31 Sodium Lauryl Sulfate - -    32 Lanolin Alcohol - -    33 Turpentine - -    34 Cetylstearylalcohol (+) -    35 Chlorhexidine Dicluconate - -    36 Budenoside - -    37 Imidazolidinyl Urea  - -    38 Ethyl-2 Cyanoacrylate - -    39 Quaternium 15 (Dowicil 200) - -    40 Decyl Glucoside - -      EMULSIFIERS & ADDITIVES        No Substance 2 days 4 days remarks   41 Polyethylene Glycol-400 - -    42 Cocamidopropyl Betaine - -    43 Amerchol L101 - -    44 Propylene Glycol - -    45 Triethanolamine - -    46 Sorbitane Sesquiolate - -    47 Isopropylmyristate - -    48 Polysorbate 80  - -    49 Amidoamine   (Stearamidopropyl Dimethylamine) - -    50 Oleamidopropyl Dimethylamine - -    51 Lauryl Glucoside - -    52 Coconut Diethanolamide  - -    53 2-Hydroxy-4-Methoxy Benzophenone (Oxybenzone) - -    54 Benzophenone-4 (Sulisobenzon) - -    55 Propolis - -     56 Dexpanthenol - -    57 Carboxymethyl Cellulose Sodium - -    58 Abitol - -    59 Tert-Butylhydroquinone - -    60 Benzyl Salicylate - -     Antioxidant      61 Dodecyl Gallate - -    62 Butylhydroxyanisole (BHA) - -    63 Butylhydroxytoluene (BHT) - -    64 Di-Alpha-Tocopherol (Vit E) - -    65 Propyl Gallate - -      PERFUMES, FLAVORS & PLANTS         No Substance 2 days 4 days remarks   66 Benzyl Salicylate - -    67 Benzyl Cinnamate - -    68 Di-Limonene (Dipentene) - -    69 Cananga Odorata (Valerie Padilla) (I) - -    70 Lichen Acid Mix - -    71 Mentha Piperita Oil (Peppermint Oil) - -    72 Sesquiterpenelactone mix - -    73 Tea Tree Oil, Oxidized - -    74 Wood Tar Mix - -    75 Abietic Acid - -    76 Lavendula Angustifolia Oil (Lavender Oil) - -    77 Camphor  - -     Fragrance Mix I      78 [place] Absolute - -    79 Eugenol - -    80 Geraniol - -    81 Hydroxycitronellal - -    82 Isoeugenol - -    83 Cinnamic Aldehyde - -    84 Cinnamic Alcohol  - -    85 Sorbitane Sesquioleate - -     Fragrance mix II      86 Citronellol - -    87 Alpha-Hexylcinnamic Aldehyde    - -    88 Citral - -    89 Farnesol - -    90 Coumarin - -       Results of patch tests:                         Interpretation:    - Negative                    A    = Allergic      (+) Erythema    TI   = Toxic/irritant   + E + Infiltration    RaP = Relevance at Present     ++ E/I + Papulovesicle   Rpr  = Relevance Previously     +++ E/I/P + Blister     nR   = No Relevance    [x] Allergic reaction diagnosed against: Thiuram Mix [A] +, Nickel +/++    Atopy screen prick test (03/29/2019)    No Substance Readings (15min) Evaluation   POS Histamine 1mg/ml ++    NEG NaCl 0.9% -      Atopy Screen  No Substance Readings (15min) Evaluation   1 Alternaria alternata (tenuis)  -    2 Cladosporium herbarum -    3 Aspergillus fumigatus -    4 Penicillium notatum -    5 Dermatophagoides pteronyssinus -    6 Dermatophagoides farinae -    7 Dog epithelium  (canis spp) -    8 Cat hair (domo catus) -    9 Cockroach   (Blatella americana & germanica) -    10 Grass mix midwest   (Carola, Orchard, [place], [place]) -    11 Kaleb grass (sorghum halepense) -    12 Weed mix   (common Cocklebur, Lamb s quarters, rough redroot Pigweed, Dock/Sorrel) -    13 Mugwort (artemisia vulgare) -    14 Ragweed giant/short (ambrosia spp) -    15 English Plantain (plantago lanceolata) -    16 Tree mix 1 (Pecan, Maple BHR, Oak RVW, american [place], black [place]) NA Not in stock   17 Red cedar (juniperus virginia) -    18 Tree mix 2   (white Kaden, river/red Birch, black [place], common [place], american Elm) NA Not in stock    19 Box elder/Maple mix (acer spp) -    20 [place] shagbark (carya ovata) -       -      Interpretation/ remarks:     Patient has slight reactions to the rubber chemical, Thiuram Mix, but not very strong, as well as a reaction to nickel. However, this could be well explained by an atopic predisposition. In the blood test results there was no latex allergy, but to Thiuram (which is present in rubber gloves). We propose to remove latex from allergy list, but patient should avoid rubber gloves moving forward.      ==> final Diagnosis:    >> chronic Dermatitis on face and axillae   DDx irritant vs seborrhoic dermatitis   --> no signs for relevant contact sensibilisation (contact allergy to Nickel and Thiuram --> but probably not relevant for the   facial dermatitis)    >> unlikely Latex allergy, probably a slight delayed type sensitization to rubber chemical Thiuram      ==> Treatment prescribed/Plan:    Continue tacrolimus 0.1% ointment to the affected areas    Ketoconazole shampoo 3xweekly and in between Selsun blue Shampoo    Use unscented facial creams, body creams and body washes (e.g. Vanicream products or Free&clear)        Total IgE and specific IgE to latex blood tests = not elevated and therefore no signs for latex allergy      Prick test atopy screen negative  ==> no atopic predisposition       These conclusions are made at the best of ones knowledge and belief  based on the provided evidence such as patients history and allergy test results and they can change over time or can be incomplete because of missing informations.

## 2019-03-29 NOTE — PROGRESS NOTES
"[facility] Dermatology Note    Dermatology Problem List:  1. Dermatitis   -referred for patch testing (2/18/19)   - tacrolimus 0.1% ointment   - prior triamcinolone and hydrocortisone valerate without improvement    Encounter Date: Mar 29, 2019    CC:  Dermatitis, consideration of patch testing     History of Present Illness:  Ms. Karen Beverly is a 38 year old female who presents for consideration of patch testing for recurrent dermatitis involving her scalp, ears, eyelids, neck, axillae, and legs that has been going on since last March 2018. She reports redness around her eyelids and itching on her legs today. She has tried triamcinolone and hydrocortisone which didn't not provide relief. She was prescribed tacrolimus ointment at her last visit but hasn't used it yet. She notes allergies to augmentin and latex but denies a history of asthma or seasonal allergies. She switched her products to Dove from [employer]. She uses Tide laundry detergent but not the sensitive skin type. She does not color her hair. Her son has \"bad skin\" though and she thinks it might be eczema. She is taking him to the doctor soon to evaluate this. Finally, her family recently took a trip to [place] and her skin (and her son's) skin improved there.     She switched from Ivory to Dove products. She uses Tide detergent and puts her clothes on an extra rinse cycle. She also uses children's products for her kids clothing.     She saw allergy for environmental allergen skin testing which was negative 10/2017 but the probe was also not reactive. She notes an allergy to Latex gloves; she feels her hands get irritated after using them but denies having hives.     She notes that she is still breastfeeding her son.     Past Medical History:   Patient Active Problem List   Diagnosis     Pregnancy related condition     Elevated TSH     Thyroid disease, antepartum     History of multiple miscarriages     Hashimoto's thyroiditis     " Indication for care in labor or delivery     Labor and delivery, indication for care     Rhinoconjunctivitis     Chronic sinusitis, unspecified location     Gastroesophageal reflux disease without esophagitis     Non-functioning gallbladder     Eczema, unspecified type     Pruritic disorder     Past Medical History:   Diagnosis Date     Eczema      Endometriosis      Thyroid disease      Past Surgical History:   Procedure Laterality Date     DAVINCI PELVIC PROCEDURE  2/22/2012    Procedure:DAVINCI PELVIC PROCEDURE; DAVINCI DIAGNOSTIC PELVISCOPY WITH OMNIGUIDE C02 LASER, DIAGNOSTIC HYSTEROSCOPY, EXTENSIVE LYSIS EXCISION OF ENDOMETRIOSIS, BILATERAL URETEROLYSIS, D & C; Surgeon:JOHN KELLY; Location: OR     Social History:  Patient reports that  has never smoked. she has never used smokeless tobacco. She reports that she does not drink alcohol or use drugs.    Family History:  Family History   Problem Relation Age of Onset     Hypertension Mother      Cancer Other         liver cancer     Liver Cancer Maternal Grandmother 79     Hypertension Maternal Grandfather      Heart Disease Paternal Grandmother 62     Heart Disease Paternal Grandfather 60     Melanoma No family hx of      Skin Cancer No family hx of      Medications:  Current Outpatient Medications   Medication Sig Dispense Refill     hydrocortisone (ANUSOL-HC) 2.5 % cream Place rectally 2 times daily as needed for hemorrhoids (Patient not taking: Reported on 3/25/2019) 30 g 1     hydrocortisone valerate (WEST-BELKYS) 0.2 % ointment Apply topically 2 times daily Behind ears, eyelids, and underarms (Patient not taking: Reported on 3/25/2019) 15 g 0     levothyroxine (SYNTHROID/LEVOTHROID) 75 MCG tablet Take 1 tablet (75 mcg) by mouth daily 90 tablet 3     PRENATAL VITAMINS PO Take 1 tablet by mouth daily.         tacrolimus (PROTOPIC) 0.1 % external ointment Apply topically 2 times daily 100 g 3     triamcinolone (KENALOG) 0.1 % ointment Apply  topically 2 times daily Avoid using on face. 60 g 0     Review of Systems:  -As per HPI  -Constitutional: Otherwise feeling well today, in usual state of health.  -HEENT: Patient denies nonhealing oral sores.  -Skin: As above in HPI. No additional skin concerns.    Physical exam:  Vitals: There were no vitals taken for this visit.  GEN: This is a well developed, well-nourished male in no acute distress, in a pleasant mood.    SKIN: Payne phototype: II   Examination of the back, abdomen, upper chest, face, scalp, neck, upper extremities, and lower legs was performed.   - Faint pink patches with overlying scale in the postauricular sulci    - Mild erythema and lichenification on the upper eyelids   - Few excoriated papules on the chest and back  - Faint erythematous papules on the L brow  - No other lesions of concern on areas examined.       Order for PATCH TESTS    [x] Outpatient  [] Inpatient: [place]..../ Bed ....      Skin Atopy (atopic dermatitis) [] Yes   [x] No  Rhinitis/Sinusitis:   [x] Yes   [] No  Allergic Asthma:   [] Yes   [x] No  Food Allergy:   [] Yes   [x] No  Leg ulcers:   [] Yes   [x] No  Hand eczema:   [] Yes   [x] No   Leading hand:   [] R   [] L       [] Ambidextrous                        Reason for tests (suspected allergy): Recurrent eczematous lesions, periorbital, retroauricular and axillae. DDx includes ACD to fragrance, chemicals (latex glove reaction? Rubber additives? Hair cosmetics?)  Known previous allergies: Latex?    Standardized panels  [x] Standard panel (40 tests)  [] Preservatives & Antimicrobials (31 tests)  [x] Emulsifiers & Additives (25 tests)   [x] Perfumes/Flavours & Plants (25 tests)  [] Hairdresser panel (12 tests)  [] Rubber Chemicals (22 tests)  [] Plastics (26 tests)  [] Colorants/Dyes/Food additives (20 tests)  [] Metals (implants/dental) (24 tests)  [] Local anaesthetics/NSAIDs (13 tests)  [] Antibiotics & Antimycotics (14 tests)   [] Corticosteroids (15 tests)    [] Photopatch test (62 tests)   [] others: ...      [] Patient's own products: ...    DO NOT test if chemical or biological identity is unknown!     always ask from patient the product information and safety sheets (MSDS)     **If patch testing is clear then we could consider repeating the environmental allergen prick test to evaluate atopic predisposition**    RESULTS & EVALUATION of PATCH TESTS    Patch test readings after     [x] 2 days, [] 3 days [x] 4 days, [] 5 days,    Applied patch tests with results (import here the list of patch tests):    Order documented by: Jaci Gutierrez CMA     Physician:    Date/time of application:03/25/2019  Localization of application: Back without lesions      STANDARD Series         No Substance 2 days 4 days remarks   1 Anurag Mix [C] - -    2 Colophony - -    3  2-Mercaptobenzothiazole  - -     4 Methylisothiazolinone - -    5 Carba Mix (+) -    6 Thiuram Mix [A] (+) + A few papules without much erythema   7 Bisphenol A Epoxy Resin - -    8 C-Fheg-Vkngreavhuq-Formaldehyde Resin - -    9 Mercapto Mix [A] - -    10 Black Rubber Mix- PPD [B] - -    11 Potassium Dichromate  -  -    12 Balsam of [place] (Myroxylon Pereirae Resin) - -    13 Nickel Sulphate Hexahydrate (+) +/++    14 Mixed Dialkyl Thiourea - -    15 Paraben Mix [B] - -    16 Methyldibromo Glutaronitrile - -    17 Fragrance Mix - -    18 2-Bromo-2-Nitropropane-1,3-Diol (Bronopol) - -    19 Lyral - -    20 Tixocortol-21- Pivalate - -    21 Diazolidiyl Urea (Germall II) - -    22 Methyl Methacrylate - -    23 Cobalt (II) Chloride Hexahydrate (+) -    24 Fragrance Mix II  - -    25 Compositae Mix - -    26 Benzoyl Peroxide (+) -    27 Bacitracin (+) -    28 Formaldehyde - -    29 Methylchloroisothiazolinone / Methylisothiazolinone - -    30 Corticosteroid Mix - -    31 Sodium Lauryl Sulfate - -    32 Lanolin Alcohol - -    33 Turpentine - -    34 Cetylstearylalcohol (+) -    35 Chlorhexidine Dicluconate - -     36 Budenoside - -    37 Imidazolidinyl Urea  - -    38 Ethyl-2 Cyanoacrylate - -    39 Quaternium 15 (Dowicil 200) - -    40 Decyl Glucoside - -      EMULSIFIERS & ADDITIVES        No Substance 2 days 4 days remarks   41 Polyethylene Glycol-400 - -    42 Cocamidopropyl Betaine - -    43 Amerchol L101 - -    44 Propylene Glycol - -    45 Triethanolamine - -    46 Sorbitane Sesquiolate - -    47 Isopropylmyristate - -    48 Polysorbate 80  - -    49 Amidoamine   (Stearamidopropyl Dimethylamine) - -    50 Oleamidopropyl Dimethylamine - -    51 Lauryl Glucoside - -    52 Coconut Diethanolamide  - -    53 2-Hydroxy-4-Methoxy Benzophenone (Oxybenzone) - -    54 Benzophenone-4 (Sulisobenzon) - -    55 Propolis - -    56 Dexpanthenol - -    57 Carboxymethyl Cellulose Sodium - -    58 Abitol - -    59 Tert-Butylhydroquinone - -    60 Benzyl Salicylate - -     Antioxidant      61 Dodecyl Gallate - -    62 Butylhydroxyanisole (BHA) - -    63 Butylhydroxytoluene (BHT) - -    64 Di-Alpha-Tocopherol (Vit E) - -    65 Propyl Gallate - -      PERFUMES, FLAVORS & PLANTS         No Substance 2 days 4 days remarks   66 Benzyl Salicylate - -    67 Benzyl Cinnamate - -    68 Di-Limonene (Dipentene) - -    69 Cananga Odorata (Valerie Padilla) (I) - -    70 Lichen Acid Mix - -    71 Mentha Piperita Oil (Peppermint Oil) - -    72 Sesquiterpenelactone mix - -    73 Tea Tree Oil, Oxidized - -    74 Wood Tar Mix - -    75 Abietic Acid - -    76 Lavendula Angustifolia Oil (Lavender Oil) - -    77 Camphor  - -     Fragrance Mix I      78 [place] Absolute - -    79 Eugenol - -    80 Geraniol - -    81 Hydroxycitronellal - -    82 Isoeugenol - -    83 Cinnamic Aldehyde - -    84 Cinnamic Alcohol  - -    85 Sorbitane Sesquioleate - -     Fragrance mix II      86 Citronellol - -    87 Alpha-Hexylcinnamic Aldehyde    - -    88 Citral - -    89 Farnesol - -    90 Coumarin - -       Results of patch tests:                          Interpretation:    - Negative                    A    = Allergic      (+) Erythema    TI   = Toxic/irritant   + E + Infiltration    RaP = Relevance at Present     ++ E/I + Papulovesicle   Rpr  = Relevance Previously     +++ E/I/P + Blister     nR   = No Relevance    [x] Allergic reaction diagnosed against: Thiuram Mix [A] +, Nickel +/++    Atopy screen prick test (03/29/2019)    No Substance Readings (15min) Evaluation   POS Histamine 1mg/ml ++    NEG NaCl 0.9% -      Atopy Screen  No Substance Readings (15min) Evaluation   1 Alternaria alternata (tenuis)  -    2 Cladosporium herbarum -    3 Aspergillus fumigatus -    4 Penicillium notatum -    5 Dermatophagoides pteronyssinus -    6 Dermatophagoides farinae -    7 Dog epithelium (canis spp) -    8 Cat hair (domo catus) -    9 Cockroach   (Blatella americana & germanica) -    10 Grass mix midwest   (Carola, Orchard, [place], [place]) -    11 Kaleb grass (sorghum halepense) -    12 Weed mix   (common Cocklebur, Lamb s quarters, rough redroot Pigweed, Dock/Sorrel) -    13 Mugwort (artemisia vulgare) -    14 Ragweed giant/short (ambrosia spp) -    15 English Plantain (plantago lanceolata) -    16 Tree mix 1 (Pecan, Maple BHR, Oak RVW, american [place], black [place]) NA Not in stock   17 Red cedar (juniperus virginia) -    18 Tree mix 2   (white Kaden, river/red Birch, black [place], common [place], american Elm) NA Not in stock    19 Box elder/Maple mix (acer spp) -    20 [place] shagbark (carya ovata) -       -      In prick tests no signs for immediate sensitizations and therefore no signs for atopic predisposition    Interpretation/ remarks:     Patient has slight reactions to the rubber chemical, Thiuram Mix, but not very strong. However, this could explain the prior reactions to rubber products. There is no sign for Latex allergy (spec IgE Latex negatives), but there is an indication for slight contact sensibilization against rubber chemicals.  by an atopic  predisposition.        Total IgE and specific IgE to latex blood tests = not elevated and therefore no signs for latex allergy      Prick test atopy screen negative ==> no atopic predisposition      ==> final Diagnosis:    >> chronic Dermatitis on face and axillae DDx irritant vs seborrhoic dermatitis    no signs for strong and relevant contact sensibilisation (contact allergy to Nickel and Thiuram --> but  probably not relevant for the facial dermatitis)    No signs for extrinsic atopic predisposition (intrinsic atopy cannot be excluded)    >> unlikely Latex allergy, probably a slight delayed type sensitization to rubber chemical Thiuram    removed latex from allergy list, but patient should avoid rubber gloves moving forward.      These conclusions are made at the best of ones knowledge and belief  based on the provided evidence such as patients history and allergy test results and they can change over time or can be incomplete because of missing informations.    ==> Treatment prescribed/Plan:    Continue tacrolimus 0.1% ointment to the affected areas    Ketoconazole shampoo 3xweekly and in between Selsun blue Shampoo    Use unscented facial creams, body creams and body washes (e.g. Vanicream products or Free&clear)       CC Kevin Zarco MD  [address] [place] [address] on close of this encounter.    I spent a total of 30 min face to face with Karen Beverly during today's office visit. About 50% of the time was spent counseling the patient and/or coordinating care regarding their allergy.

## 2019-06-24 DIAGNOSIS — E06.3 HASHIMOTO'S THYROIDITIS: ICD-10-CM

## 2019-06-29 RX ORDER — LEVOTHYROXINE SODIUM 75 UG/1
75 TABLET ORAL DAILY
Qty: 90 TABLET | Refills: 2 | Status: SHIPPED | OUTPATIENT
Start: 2019-06-29 | End: 2019-07-15

## 2019-07-12 ENCOUNTER — TELEPHONE (OUTPATIENT)
Dept: FAMILY MEDICINE | Facility: CLINIC | Age: 39
End: 2019-07-12

## 2019-07-12 DIAGNOSIS — L30.9 ECZEMA, UNSPECIFIED TYPE: Primary | ICD-10-CM

## 2019-07-12 DIAGNOSIS — L29.9 PRURITIC DISORDER: ICD-10-CM

## 2019-07-12 NOTE — TELEPHONE ENCOUNTER
----- Message -----     From: Karen Beverly     Sent: 7/11/2019 10:58 AM CDT       To: Patient Referral Request Mailing List  Subject: Referral Request    Karen Beverly would like to request a referral.  Reason: Skin problelem  Requested provider: Lars Burt MD  Comment:  Mrs Dr NARGIS Burt  I would like to ask you,  for a referral to  dermatologist specialist because I have still skin problem which is worst than was on February when you saw me last visit.   Thank you.  Karen Beverly

## 2019-07-13 NOTE — TELEPHONE ENCOUNTER
[facility]: Dermatology Clinic [facility] [phone]   [email or web address]     Referral placed.  Lars Burt MD

## 2019-07-15 ENCOUNTER — OFFICE VISIT (OUTPATIENT)
Dept: ENDOCRINOLOGY | Facility: CLINIC | Age: 39
End: 2019-07-15
Payer: COMMERCIAL

## 2019-07-15 VITALS
DIASTOLIC BLOOD PRESSURE: 67 MMHG | SYSTOLIC BLOOD PRESSURE: 110 MMHG | BODY MASS INDEX: 23.31 KG/M2 | HEART RATE: 80 BPM | WEIGHT: 141.6 LBS

## 2019-07-15 DIAGNOSIS — E06.3 HASHIMOTO'S THYROIDITIS: ICD-10-CM

## 2019-07-15 DIAGNOSIS — R21 RASH AND NONSPECIFIC SKIN ERUPTION: Primary | ICD-10-CM

## 2019-07-15 LAB
T4 FREE SERPL-MCNC: 0.98 NG/DL (ref 0.76–1.46)
TSH SERPL DL<=0.005 MIU/L-ACNC: 1.68 MU/L (ref 0.4–4)

## 2019-07-15 RX ORDER — LEVOTHYROXINE SODIUM 75 UG/1
75 TABLET ORAL DAILY
Qty: 90 TABLET | Refills: 3 | Status: SHIPPED | OUTPATIENT
Start: 2019-07-15 | End: 2020-06-21

## 2019-07-15 ASSESSMENT — PAIN SCALES - GENERAL: PAINLEVEL: NO PAIN (0)

## 2019-07-15 NOTE — LETTER
7/15/2019       RE: Karen Beverly  [address] [facility] [place] [address]     Dear Colleague,    Thank you for referring your patient, Karen Beverly, to the [facility] ENDOCRINOLOGY at [facility]. Please see a copy of my visit note below.      The patient is seen in f/up for Hashimoto thyroiditis, diagnosed in 2013.     Karen Diehl is a 39 year old female who was found to have an elevated TSH while having lab work done in [place], in August 2013, approximately 6 months postdelivery, when she developed fatigue, hair loss, and joint pain. In the fall of 2013, she established medical care here and she was formally diagnosed with Hashimoto thyroiditis. Since 2013, she was treated with a stable dose of 75  g levothyroxine daily, until she got pregnant.  She delivered in March 2017 and, since delivery, she went back on her regular dose of levothyroxine.     Her main complaint today is a rash. She has been dealing with a rash that has been on her scalp, ears, eyelids, axillae, genital fadi and anus for some time. She has seen a dermatologist and an allergist but has not received a clear answer as to what it is. She has been prescribed triamcinolone and tacrolimus, which have not provided much relief. The rash has limited her exercise and she thinks she's gained a few pounds because of this and would like to lose some weight. Other than her rash she is doing well. No headaches, fevers, vision changes, chest pain, palpitations, shortness of breath, nausea, vomiting, abdominal pain, diarrhea, constipation, numbness/tingling, edema, weakness, fatigue more than usual, or changes in bowel/bladder movements. She is wondering if her rash is associated with hypothyroidism. She is also concerned her daughter may have thyroid issues; she is losing hair and often has cold/sweaty palms which were the symptoms she had when she was diagnosed.     Past Medical History:   Diagnosis Date     Eczema       Endometriosis      Thyroid disease    Iron deficiency anemia   Endometriosis   GERD  Internal hemorrhoids  Miscarriage 2016    Past Surgical History:   Procedure Laterality Date     DAVINCI PELVIC PROCEDURE  2/22/2012    Procedure:DAVINCI PELVIC PROCEDURE; DAVINCI DIAGNOSTIC PELVISCOPY WITH OMNIGUIDE C02 LASER, DIAGNOSTIC HYSTEROSCOPY, EXTENSIVE LYSIS EXCISION OF ENDOMETRIOSIS, BILATERAL URETEROLYSIS, D & C; Surgeon:JOHN KELLY; Location: OR     Current Medications    Current Outpatient Medications:      levothyroxine (SYNTHROID/LEVOTHROID) 75 MCG tablet, Take 1 tablet (75 mcg) by mouth daily, Disp: 90 tablet, Rfl: 3     PRENATAL VITAMINS PO, Take 1 tablet by mouth daily.  , Disp: , Rfl:      hydrocortisone (ANUSOL-HC) 2.5 % cream, Place rectally 2 times daily as needed for hemorrhoids (Patient not taking: Reported on 3/25/2019), Disp: 30 g, Rfl: 1     hydrocortisone valerate (WEST-BELKYS) 0.2 % ointment, Apply topically 2 times daily Behind ears, eyelids, and underarms (Patient not taking: Reported on 3/25/2019), Disp: 15 g, Rfl: 0     pyrithione zinc (SELSUN BLUE/HEAD AND SHOULDERS) 1 % external shampoo, On the days without Ketoconazole shampoo, Disp: 240 mL, Rfl: 3     tacrolimus (PROTOPIC) 0.1 % external ointment, Apply topically 2 times daily, Disp: 100 g, Rfl: 3     triamcinolone (KENALOG) 0.1 % ointment, Apply topically 2 times daily Avoid using on face., Disp: 60 g, Rfl: 0    Family History   Problem Relation Age of Onset     Hypertension Mother      Cancer Maternal grandmother       liver cancer   Maternal grandmother HTN.     Social History   with two children. She denies smoking, drinking alcohol or using illicit drugs. Occupation: stay at home mom.     Review of Systems   Systemic: No acute complaints  Eye:                      Eye glasses   Chanel-Laryngeal:     No dysphagia  Breast:                  No breast symptoms  Cardiovascular:    No cardiovascular symptoms, no CP or palpitations    Pulmonary:           no SOB  Gastrointestinal:   Intermittent constipation relieved by high fiber intake - stable   Genitourinary:       no increased thirst or urinary frequency  Endocrine:            No cold or heat intolerance.   Neurological:        No headaches, no tremor, no numbness or tingling sensation, no dizziness   Musculoskeletal: No joint pain  Skin:                    no hair loss; widespread rash, no abnormal hyperpigmentation  Psychological:   No psych complaints              Vital Signs     Previous Weights:    Wt Readings from Last 5 Encounters:   07/15/19 64.2 kg (141 lb 9.6 oz)   02/18/19 62.1 kg (137 lb)   10/02/18 (P) 61.6 kg (135 lb 12.8 oz)   07/02/18 62.6 kg (137 lb 14.4 oz)   05/17/18 62.2 kg (137 lb 1.6 oz)        /67   Pulse 80   Wt 64.2 kg (141 lb 9.6 oz)   BMI 23.31 kg/m       Physical Exam  General Appearance: she is well developed, well nourished and in no distress     Eyes:  conjutivae and extra-ocular motions are normal.                                    pupils round and reactive to light, no lid lag, no stare    HEENT:   oropharynx clear and moist, no JVD, no bruits      no thyromegaly, on-tender thyroid  Cardiovascular:  regular rhythm, no murmurs, distal pulse palpable, no edema  Respiratory:        chest clear, no rales, no rhonchi   Gastrointestinal:  abdomen soft, non-tender, non-distended, normal bowel sounds,    no organomegaly  Musculoskeletal:  normal tone and strength  Psychological:          affect and judgment normal  Skin:  Red raised rash bilateral axillae   Neurological:  reflexes normal and symmetric, no resting tremor.      Lab Results  I reviewed prior lab results documented in EPIC.   TSH   Date Value Ref Range Status   02/18/2019 1.33 0.40 - 4.00 mU/L Final     T4 Free   Date Value Ref Range Status   10/25/2018 1.03 0.76 - 1.46 ng/dL Final     Lab Results   Component Value Date    A1C 5.4 08/08/2016     Assessment     1. Hashimoto  thyroiditis    Clinically, the patient does not endorse definite signs or symptoms suggestive of hypothyroidism. We are going to check the thyroid hormone levels today and adjust the dose of levothyroxine accordingly. She may follow with her PCP in the future.   Her daughter has a higher risk of developing Hashimoto thyroiditis and we recommended to be screened for hypothyroidism.    2.  Skin rash  Differential diagnosis: Autoimmune dermatitis, celiac dermatitis herpetiformis (atypical lesions, prior screening for celiac disease was negative), mucocutaneous candidiasis (atypical lesions and, besides Hashimoto thyroiditis, no other evidence of polyglandular autoimmune syndrome).  She plans on trying on antifungal topical cream, as recommended by a [place] dermatologist.    Orders Placed This Encounter   Procedures     TSH     T4 free     Meeta Lopez, PGY-3    Physician Attestation   I, Jayne Zapata, was present with the resident who participated in the service and in the documentation of the note.  I have verified the history and personally performed the physical exam and medical decision making.  I agree with the assessment and plan of care as documented in the note.        Jayne Zapata MD

## 2019-07-15 NOTE — PROGRESS NOTES
The patient is seen in f/up for Hashimoto thyroiditis, diagnosed in 2013.     Karen Diehl is a 39 year old female who was found to have an elevated TSH while having lab work done in [place], in August 2013, approximately 6 months postdelivery, when she developed fatigue, hair loss, and joint pain. In the fall of 2013, she established medical care here and she was formally diagnosed with Hashimoto thyroiditis. Since 2013, she was treated with a stable dose of 75  g levothyroxine daily, until she got pregnant.  She delivered in March 2017 and, since delivery, she went back on her regular dose of levothyroxine.     Her main complaint today is a rash. She has been dealing with a rash that has been on her scalp, ears, eyelids, axillae, genital fadi and anus for some time. She has seen a dermatologist and an allergist but has not received a clear answer as to what it is. She has been prescribed triamcinolone and tacrolimus, which have not provided much relief. The rash has limited her exercise and she thinks she's gained a few pounds because of this and would like to lose some weight. Other than her rash she is doing well. No headaches, fevers, vision changes, chest pain, palpitations, shortness of breath, nausea, vomiting, abdominal pain, diarrhea, constipation, numbness/tingling, edema, weakness, fatigue more than usual, or changes in bowel/bladder movements. She is wondering if her rash is associated with hypothyroidism. She is also concerned her daughter may have thyroid issues; she is losing hair and often has cold/sweaty palms which were the symptoms she had when she was diagnosed.     Past Medical History:   Diagnosis Date     Eczema      Endometriosis      Thyroid disease    Iron deficiency anemia   Endometriosis   GERD  Internal hemorrhoids  Miscarriage 2016    Past Surgical History:   Procedure Laterality Date     DAVINCI PELVIC PROCEDURE  2/22/2012    Procedure:DAVINCI PELVIC PROCEDURE; DAVINCI DIAGNOSTIC  PELVISCOPY WITH OMNIGUIDE C02 LASER, DIAGNOSTIC HYSTEROSCOPY, EXTENSIVE LYSIS EXCISION OF ENDOMETRIOSIS, BILATERAL URETEROLYSIS, D & C; Surgeon:JOHN KELLY; Location: OR     Current Medications    Current Outpatient Medications:      levothyroxine (SYNTHROID/LEVOTHROID) 75 MCG tablet, Take 1 tablet (75 mcg) by mouth daily, Disp: 90 tablet, Rfl: 3     PRENATAL VITAMINS PO, Take 1 tablet by mouth daily.  , Disp: , Rfl:      hydrocortisone (ANUSOL-HC) 2.5 % cream, Place rectally 2 times daily as needed for hemorrhoids (Patient not taking: Reported on 3/25/2019), Disp: 30 g, Rfl: 1     hydrocortisone valerate (WEST-BELKYS) 0.2 % ointment, Apply topically 2 times daily Behind ears, eyelids, and underarms (Patient not taking: Reported on 3/25/2019), Disp: 15 g, Rfl: 0     pyrithione zinc (SELSUN BLUE/HEAD AND SHOULDERS) 1 % external shampoo, On the days without Ketoconazole shampoo, Disp: 240 mL, Rfl: 3     tacrolimus (PROTOPIC) 0.1 % external ointment, Apply topically 2 times daily, Disp: 100 g, Rfl: 3     triamcinolone (KENALOG) 0.1 % ointment, Apply topically 2 times daily Avoid using on face., Disp: 60 g, Rfl: 0    Family History   Problem Relation Age of Onset     Hypertension Mother      Cancer Maternal grandmother       liver cancer   Maternal grandmother HTN.     Social History   with two children. She denies smoking, drinking alcohol or using illicit drugs. Occupation: stay at home mom.     Review of Systems   Systemic: No acute complaints  Eye:                      Eye glasses   Chanel-Laryngeal:     No dysphagia  Breast:                  No breast symptoms  Cardiovascular:    No cardiovascular symptoms, no CP or palpitations   Pulmonary:           no SOB  Gastrointestinal:   Intermittent constipation relieved by high fiber intake - stable   Genitourinary:       no increased thirst or urinary frequency  Endocrine:            No cold or heat intolerance.   Neurological:        No headaches, no  tremor, no numbness or tingling sensation, no dizziness   Musculoskeletal: No joint pain  Skin:                    no hair loss; widespread rash, no abnormal hyperpigmentation  Psychological:   No psych complaints              Vital Signs     Previous Weights:    Wt Readings from Last 5 Encounters:   07/15/19 64.2 kg (141 lb 9.6 oz)   02/18/19 62.1 kg (137 lb)   10/02/18 (P) 61.6 kg (135 lb 12.8 oz)   07/02/18 62.6 kg (137 lb 14.4 oz)   05/17/18 62.2 kg (137 lb 1.6 oz)        /67   Pulse 80   Wt 64.2 kg (141 lb 9.6 oz)   BMI 23.31 kg/m      Physical Exam  General Appearance: she is well developed, well nourished and in no distress     Eyes:  conjutivae and extra-ocular motions are normal.                                    pupils round and reactive to light, no lid lag, no stare    HEENT:   oropharynx clear and moist, no JVD, no bruits      no thyromegaly, on-tender thyroid  Cardiovascular:  regular rhythm, no murmurs, distal pulse palpable, no edema  Respiratory:        chest clear, no rales, no rhonchi   Gastrointestinal:  abdomen soft, non-tender, non-distended, normal bowel sounds,    no organomegaly  Musculoskeletal:  normal tone and strength  Psychological:          affect and judgment normal  Skin:  Red raised rash bilateral axillae   Neurological:  reflexes normal and symmetric, no resting tremor.      Lab Results  I reviewed prior lab results documented in EPIC.   TSH   Date Value Ref Range Status   02/18/2019 1.33 0.40 - 4.00 mU/L Final     T4 Free   Date Value Ref Range Status   10/25/2018 1.03 0.76 - 1.46 ng/dL Final     Lab Results   Component Value Date    A1C 5.4 08/08/2016     Assessment     1. Hashimoto thyroiditis    Clinically, the patient does not endorse definite signs or symptoms suggestive of hypothyroidism. We are going to check the thyroid hormone levels today and adjust the dose of levothyroxine accordingly. She may follow with her PCP in the future.   Her daughter has a higher  risk of developing Hashimoto thyroiditis and we recommended to be screened for hypothyroidism.    2.  Skin rash  Differential diagnosis: Autoimmune dermatitis, celiac dermatitis herpetiformis (atypical lesions, prior screening for celiac disease was negative), mucocutaneous candidiasis (atypical lesions and, besides Hashimoto thyroiditis, no other evidence of polyglandular autoimmune syndrome).  She plans on trying on antifungal topical cream, as recommended by a [place] dermatologist.    Orders Placed This Encounter   Procedures     TSH     T4 free     [facility], PGY-3    Physician Attestation   I, Jayne Zapata, was present with the resident who participated in the service and in the documentation of the note.  I have verified the history and personally performed the physical exam and medical decision making.  I agree with the assessment and plan of care as documented in the note.        Jayne Zapata MD

## 2019-07-22 ENCOUNTER — TRANSFERRED RECORDS (OUTPATIENT)
Dept: HEALTH INFORMATION MANAGEMENT | Facility: CLINIC | Age: 39
End: 2019-07-22

## 2019-07-29 ENCOUNTER — OFFICE VISIT (OUTPATIENT)
Dept: FAMILY MEDICINE | Facility: CLINIC | Age: 39
End: 2019-07-29
Payer: COMMERCIAL

## 2019-07-29 VITALS
OXYGEN SATURATION: 100 % | SYSTOLIC BLOOD PRESSURE: 101 MMHG | HEART RATE: 89 BPM | TEMPERATURE: 98.7 F | BODY MASS INDEX: 23.21 KG/M2 | WEIGHT: 141 LBS | DIASTOLIC BLOOD PRESSURE: 68 MMHG

## 2019-07-29 DIAGNOSIS — H81.11 BENIGN PAROXYSMAL POSITIONAL VERTIGO OF RIGHT EAR: Primary | ICD-10-CM

## 2019-07-29 PROCEDURE — 99214 OFFICE O/P EST MOD 30 MIN: CPT | Performed by: FAMILY MEDICINE

## 2019-07-29 RX ORDER — FLUTICASONE PROPIONATE 50 MCG
2 SPRAY, SUSPENSION (ML) NASAL DAILY
Qty: 16 G | Refills: 0 | Status: SHIPPED | OUTPATIENT
Start: 2019-07-29 | End: 2019-08-20

## 2019-07-29 ASSESSMENT — PAIN SCALES - GENERAL: PAINLEVEL: NO PAIN (1)

## 2019-07-29 NOTE — PATIENT INSTRUCTIONS
Patient Education     Benign Paroxysmal Positional Vertigo     Your health care provider may move your head in certain ways to treat your BPPV.     Benign paroxysmal positional vertigo (BPPV) is a problem with the inner ear. The inner ear contains the vestibular system. This system is what helps you keep your balance. BPPV causes a feeling of spinning. It is a common problem of the vestibular system.  Understanding the vestibular system  The vestibular system of the ear is made up of very tiny parts. They include the utricle, saccule, and semicircular canals. The utricle is a tiny organ that contains calcium crystals. In some people, the crystals can move into the semicircular canals. When this happens, the system no longer works as it should. This causes BPPV. Benign means it is not life threatening. Paroxysmal means it happens suddenly. Positional means that it happens when you move your head. Vertigo is a feeling of spinning.  What causes BPPV?  Causes include injury to your head or neck. Other problems with the vestibular system may cause BPPV. In many people, the cause of BPPV is not known.  Symptoms of BPPV  You many have repeated feelings of spinning (vertigo). The vertigo usually lasts less than 1 minute. Some movements, such as rolling over in bed, can bring on vertigo.  Diagnosing BPPV  Your primary healthcare provider may diagnose and treat your BPPV. Or you may see an ear, nose, and throat doctor (otolaryngologist). In some cases, you may see a nervous system doctor (neurologist).  The healthcare provider will ask about your symptoms and your medical history. He or she will examine you. You may have hearing and balance tests. As part of the exam, your healthcare provider may have you move your head and body in certain ways. If you have BPPV, the movements can bring on vertigo. Your provider will also look for abnormal movements of your eyes. You may have other tests to check your vestibular or nervous  systems.  Treatment for BPPV  Your healthcare provider may try to move the calcium crystals. This is done by having you move your head and neck in certain ways. This treatment is safe and often works well. You may also be told to do these movements at home. You may still have vertigo for a few weeks. Your healthcare provider will recheck your symptoms, usually in about a month. Special physical therapy may also be part of treatment. In rare cases, surgery may be needed for BPPV that does not go away.     When to call the healthcare provider  Call your healthcare provider right away if you have any of these:    Symptoms that do not go away with treatment    Symptoms that get worse    New symptoms   Date Last Reviewed: 5/1/2017 2000-2018 The [employer]. [address] [place] [address], [place] [id number]. All rights reserved. This information is not intended as a substitute for professional medical care. Always follow your healthcare professional's instructions.           Patient Education     Benign Paroxysmal Positional Vertigo    Benign paroxysmal positional vertigo is a common condition. You feel as if the room is spinning after changing position, moving your head quickly, or even just rolling over in bed.  Vertigo is a false feeling of motion plus disorientation that makes it seem as though the room is spinning. A vertigo attack may cause sudden nausea, vomiting, and heavy sweating. Severe vertigo causes a loss of balance. You may even fall down.  Vertigo is caused by a problem with the inner ear. The inner ear is located behind the middle ear. It is a part of the balance center of the body. It contains small calcium particles within fluid-filled canals (semi-circular canals). These particles can move out of position. This may happen as a result of aging, head injury, or disease of the inner ear. Once that happens, moving your head in certain ways may cause the particles to stimulate the inner ear. This  creates the feeling of vertigo.  An episode of vertigo may last seconds, minutes, or hours. Once you are over the first episode of vertigo, it may never return. Sometimes symptoms return off and on for several weeks or longer.  Home care  Follow these guidelines when caring for yourself at home:    Rest quietly in bed if your symptoms are severe. Change position slowly. There is usually 1 position that will feel best. This might be lying on 1 side or lying on your back with your head slightly raised on pillows. Until you have no symptoms, you are at a higher risk of falling. Let someone help you when you get up. Get rid of home hazards such as loose electrical cords and throw rugs. Don t walk in unfamiliar areas that are not lighted. Use night lights in bathrooms and kitchen areas.    Do not drive or work with dangerous machinery for 1 week after symptoms go away. This is in case symptoms return suddenly.    Take medicine as prescribed to relieve your symptoms. Unless another medicine was prescribed for nausea, vomiting, and vertigo, you may use over-the-counter motion sickness medicine. Examples of this include meclizine and dimenhydrinate.  Follow-up care  Follow up with your healthcare provider, or as directed. Tell your provider about any ringing in your ear or hearing loss.  If you had a CT or MRI scan, a specialist will review it. You will be told of any new findings that may affect your care.  When to seek medical advice  Call your healthcare provider right away if any of these occur:    Vertigo gets worse even after taking prescribed medicine    Repeated vomiting even after taking prescribed medicine    Weakness that gets worse    Fainting    Severe headache or unusual drowsiness or confusion    Weakness of an arm or leg or 1 side of the face    Trouble walking    Trouble with speech or vision    Seizure    Trouble hearing    Fever of 100.4 F (38 C) or higher, or as directed by your healthcare  provider    Fast heart rate    Chest pain   Date Last Reviewed: 11/1/2017 2000-2018 [employer] [place]. [address] [place] [address], [place] [address]. All rights reserved. This information is not intended as a substitute for professional medical care. Always follow your healthcare professional's instructions.

## 2019-07-29 NOTE — PROGRESS NOTES
Subjective     Karen Beverly is a 39 year old female who presents to clinic today for the following health issues:    HPI   Dizziness      Duration: 1 week ago    Description   Feeling faint:  YES- sometimes  Feeling like the surroundings are moving: YES  Loss of consciousness or falls: no     Intensity:  moderate    Accompanying signs and symptoms:   Nausea/vomitting: no   Palpitations: no   Weakness in arms or legs: no   Vision or speech changes: YES- eyes seem to be jittering and moving unintentionally at night   Ringing in ears (Tinnitus): YES- Right ear  Hearing loss related to dizziness: Feels like it is open and closed  Other (fevers/chills/sweating/dyspnea): no     History (similar episodes/head trauma/previous evaluation/recent bleeding): None    Precipitating or alleviating factors (new meds/chemicals): None  Worse with activity/head movement: YES- depends on what she is doing, it can happen at any given moement    Therapies tried and outcome: Ibuprofen, Claritin, no relief.    Maddi Pereira MA    Symptoms started when she bent over.  They came on suddenly.  They have been intermittent since that time.  She has some bilateral fullness in the ears although it is greater on the right side where she also has a sensation that she can hear the ocean.  There is been some cracking and popping in the ears as well.  She has a general fullness sensation to her head but no obvious headache.  No slurred speech, hearing loss, or visual changes.  No weakness in her arms or legs.  No palpitations.  No loss of consciousness.  She has not had anything like this before.  No allergy or cold symptoms at this time.        Patient Active Problem List   Diagnosis     Elevated TSH     Thyroid disease, antepartum     History of multiple miscarriages     Hashimoto's thyroiditis     Indication for care in labor or delivery     Labor and delivery, indication for care     Rhinoconjunctivitis     Chronic sinusitis, unspecified  location     Gastroesophageal reflux disease without esophagitis     Non-functioning gallbladder     Eczema, unspecified type     Pruritic disorder     Past Surgical History:   Procedure Laterality Date     DAVINCI PELVIC PROCEDURE  2/22/2012    Procedure:DAVINCI PELVIC PROCEDURE; DAVINCI DIAGNOSTIC PELVISCOPY WITH OMNIGUIDE C02 LASER, DIAGNOSTIC HYSTEROSCOPY, EXTENSIVE LYSIS EXCISION OF ENDOMETRIOSIS, BILATERAL URETEROLYSIS, D & C; Surgeon:JOHN KELLY; Location: OR       Social History     Tobacco Use     Smoking status: Never Smoker     Smokeless tobacco: Never Used   Substance Use Topics     Alcohol use: No     Family History   Problem Relation Age of Onset     Hypertension Mother      Cancer Other         liver cancer     Liver Cancer Maternal Grandmother 79     Hypertension Maternal Grandfather      Heart Disease Paternal Grandmother 62     Heart Disease Paternal Grandfather 60     Melanoma No family hx of      Skin Cancer No family hx of          Current Outpatient Medications   Medication Sig Dispense Refill     fluticasone (FLONASE) 50 MCG/ACT nasal spray Spray 2 sprays into both nostrils daily 16 g 0     hydrocortisone (ANUSOL-HC) 2.5 % cream Place rectally 2 times daily as needed for hemorrhoids 30 g 1     hydrocortisone valerate (WEST-BELKYS) 0.2 % ointment Apply topically 2 times daily Behind ears, eyelids, and underarms 15 g 0     levothyroxine (SYNTHROID/LEVOTHROID) 75 MCG tablet Take 1 tablet (75 mcg) by mouth daily 90 tablet 3     PRENATAL VITAMINS PO Take 1 tablet by mouth daily.         pyrithione zinc (SELSUN BLUE/HEAD AND SHOULDERS) 1 % external shampoo On the days without Ketoconazole shampoo 240 mL 3     tacrolimus (PROTOPIC) 0.1 % external ointment Apply topically 2 times daily (Patient not taking: Reported on 7/29/2019) 100 g 3     triamcinolone (KENALOG) 0.1 % ointment Apply topically 2 times daily Avoid using on face. (Patient not taking: Reported on 7/29/2019) 60 g 0     No  Known Allergies    Reviewed and updated as needed this visit by Provider  Tobacco  Allergies  Meds  Problems  Med Hx  Surg Hx  Fam Hx  Soc Hx          Review of Systems   ROS COMP: Constitutional, HEENT, cardiovascular, pulmonary, gi and gu systems are negative, except as otherwise noted.      Objective    /68 (BP Location: Left arm, Patient Position: Chair, Cuff Size: Adult Regular)   Pulse 89   Temp 98.7  F (37.1  C) (Oral)   Wt 64 kg (141 lb)   SpO2 100%   BMI 23.21 kg/m    Body mass index is 23.21 kg/m .  Physical Exam   GENERAL: healthy, alert and no distress  EYES: Eyes grossly normal to inspection, PERRL and conjunctivae and sclerae normal  EYES: Eyes grossly normal to inspection, PERRL and EOMI  HENT: ear canals and TM's normal, nose and mouth without ulcers or lesions  HENT: normal cephalic/atraumatic, ear canals and TM's normal, nose and mouth without ulcers or lesions, oropharynx clear and oral mucous membranes moist  NECK: no adenopathy, no asymmetry, masses, or scars and thyroid normal to palpation  RESP: lungs clear to auscultation - no rales, rhonchi or wheezes  CV: regular rate and rhythm, normal S1 S2, no S3 or S4, no murmur, click or rub, no peripheral edema and peripheral pulses strong  MS: no gross musculoskeletal defects noted, no edema  NEURO: Normal strength and tone, mentation intact and speech normal  NEURO: Normal strength and tone, sensory exam grossly normal, mentation intact, speech normal, cranial nerves 2-12 intact, DTR's normal and symmetric 2+ and Romberg normal, finger-nose-finger normal  PSYCH: mentation appears normal, affect normal/bright    Diagnostic Test Results:  Labs reviewed in Epic        Assessment & Plan     1. Benign paroxysmal positional vertigo of right ear  Symptoms seem consistent with BPPV.  There could be a component of eustachian tube dysfunction given the cracking and popping in the right ear.  I gave her some information, discussed the Epley  maneuver, and issued a prescription for Flonase to see if that might help with the eustachian tube issues.  Reassured regarding the benign and generally self-limited nature of this condition as well as the normal exam findings without suggestion that this would be due to any other more concerning finding or need imaging at this time.  - fluticasone (FLONASE) 50 MCG/ACT nasal spray; Spray 2 sprays into both nostrils daily  Dispense: 16 g; Refill: 0           Return in about 1 week (around 8/5/2019) for if needed for vertigo recheck.    Jean Krishnamurthy MD  [facility]

## 2019-08-20 DIAGNOSIS — H81.11 BENIGN PAROXYSMAL POSITIONAL VERTIGO OF RIGHT EAR: ICD-10-CM

## 2019-08-20 RX ORDER — FLUTICASONE PROPIONATE 50 MCG
2 SPRAY, SUSPENSION (ML) NASAL DAILY
Qty: 16 G | Refills: 0 | Status: SHIPPED | OUTPATIENT
Start: 2019-08-20 | End: 2019-09-20

## 2019-08-20 NOTE — TELEPHONE ENCOUNTER
"Requested Prescriptions   Pending Prescriptions Disp Refills     fluticasone (FLONASE) 50 MCG/ACT nasal spray  Last Written Prescription Date:  7/29/19  Last Fill Quantity: 16g,  # refills: 0   Last office visit: 7/29/2019 with prescribing provider:  Raghu   Future Office Visit:     16 g 0     Sig: Spray 2 sprays into both nostrils daily       Inhaled Steroids Protocol Passed - 8/20/2019  2:32 PM        Passed - Patient is age 12 or older        Passed - Recent (12 mo) or future (30 days) visit within the authorizing provider's specialty     Patient had office visit in the last 12 months or has a visit in the next 30 days with authorizing provider or within the authorizing provider's specialty.  See \"Patient Info\" tab in inbasket, or \"Choose Columns\" in Meds & Orders section of the refill encounter.              Passed - Medication is active on med list          "

## 2019-08-20 NOTE — TELEPHONE ENCOUNTER
Routing refill request to provider for review/approval because:  RX is for vertigo.  Kitty Villanueva, [facility] Triage.

## 2019-08-22 NOTE — TELEPHONE ENCOUNTER
RECORDS RECEIVED FROM: Internal    DATE RECEIVED: 10/23/19   NOTES STATUS DETAILS   OFFICE NOTE from referring provider  Internal OV note 5/17/18   OFFICE NOTE from other specialist   N/A    DISCHARGE SUMMARY from hospital  N/A    DISCHARGE REPORT from the ER N/A    OPERATIVE REPORT  N/A    MEDICATION LIST N/A    LABS     PFC TESTING N/A    ANAL PAP N/A    BIOPSIES/PATHOLOGY RELATED TO DIAGNOSIS N/A    DIAGNOSTIC PROCEDURES     COLONOSCOPY Received 10/27/14   UPPER ENDOSCOPY (EGD) N/A    FLEX SIGMOIDOSCOPY  N/A    ERCP N/A    IMAGING (DISC & REPORT)      CT  N/A    MRI N/A    XRAY N/A    ULTRASOUND (ENDOANAL/ENDORECTAL) N/A

## 2019-09-20 DIAGNOSIS — H81.11 BENIGN PAROXYSMAL POSITIONAL VERTIGO OF RIGHT EAR: ICD-10-CM

## 2019-09-20 RX ORDER — FLUTICASONE PROPIONATE 50 MCG
SPRAY, SUSPENSION (ML) NASAL
Qty: 16 ML | Refills: 0 | Status: ON HOLD | OUTPATIENT
Start: 2019-09-20 | End: 2021-02-05

## 2019-09-20 NOTE — TELEPHONE ENCOUNTER
"Requested Prescriptions   Pending Prescriptions Disp Refills     fluticasone (FLONASE) 50 MCG/ACT nasal spray [Pharmacy Med Name: FLUTICASONE PROP 50 MCG SPRAY]  Last Written Prescription Date:  8/20/19  Last Fill Quantity: 16g,  # refills: 0   Last office visit: 7/29/2019 with prescribing provider:  Raghu   Future Office Visit:     16 mL 0     Sig: INSTILL 2 SPRAYS INTO BOTH NOSTRILS DAILY       Inhaled Steroids Protocol Passed - 9/20/2019  1:46 AM        Passed - Patient is age 12 or older        Passed - Recent (12 mo) or future (30 days) visit within the authorizing provider's specialty     Patient had office visit in the last 12 months or has a visit in the next 30 days with authorizing provider or within the authorizing provider's specialty.  See \"Patient Info\" tab in inbasket, or \"Choose Columns\" in Meds & Orders section of the refill encounter.              Passed - Medication is active on med list          "

## 2019-10-23 ENCOUNTER — OFFICE VISIT (OUTPATIENT)
Dept: SURGERY | Facility: CLINIC | Age: 39
End: 2019-10-23
Payer: COMMERCIAL

## 2019-10-23 ENCOUNTER — PRE VISIT (OUTPATIENT)
Dept: SURGERY | Facility: CLINIC | Age: 39
End: 2019-10-23

## 2019-10-23 VITALS
BODY MASS INDEX: 24.11 KG/M2 | SYSTOLIC BLOOD PRESSURE: 110 MMHG | HEART RATE: 80 BPM | DIASTOLIC BLOOD PRESSURE: 67 MMHG | HEIGHT: 65 IN | WEIGHT: 144.7 LBS | OXYGEN SATURATION: 99 %

## 2019-10-23 DIAGNOSIS — L29.0 PRURITUS ANI: Primary | ICD-10-CM

## 2019-10-23 ASSESSMENT — PAIN SCALES - GENERAL: PAINLEVEL: NO PAIN (0)

## 2019-10-23 ASSESSMENT — MIFFLIN-ST. JEOR: SCORE: 1321.61

## 2019-10-23 NOTE — LETTER
"10/23/2019       RE: Karen Beverly  [address]  [place] [address]     Dear Colleague,    Thank you for referring your patient, Karen Beverly, to the [facility] COLON AND RECTAL SURGERY at [facility]. Please see a copy of my visit note below.    Colon and Rectal Surgery Follow-Up Clinic Note    RE: Karen Beverly  : 1980  WALTER: 10/23/2019    Karen Beverly is a very pleasant 39 year old female here for follow-up of hemorrhoids.    Interval history: I saw Karen last May with hemorrhoid prolapse.  She states that she did well for period of time but the hemorrhoid started to come out again this summer and is very bothersome.  However, the past few months the hemorrhoid has not been bothering her as much.  She does, however, have fairly significant itching, especially at night.  She feels that her skin is tearing because of the itching.  She tried fiber tablets but did not use them consistently and is not currently on any bowel medications.  She feels that her bowel movements are slower and that she has difficulty emptying.  She often has to go back to the bathroom 3-4 times to fully empty.  She has some difficulty holding flatus but denies any fecal incontinence.   She had a colonoscopy in , which was normal.    Physical Examination: Exam was chaperoned by Connie Felder EMT  /67 (BP Location: Left arm, Patient Position: Sitting, Cuff Size: Adult Regular)   Pulse 80   Ht 5' 4.96\"   Wt 144 lb 11.2 oz   SpO2 99%   BMI 24.11 kg/m     General: Alert, oriented, in no acute distress, sitting comfortably  HEENT: Mucous membranes moist  Perianal external examination:  Perianal skin with mild excoriation    Digital rectal examination: Was deferred.    Anoscopy: Was deferred.    Assessment/Plan: 39 year old female with pruritus ani. Discussed that this is likely due to leakage of moisture. Recommended a fiber supplement such as Metamucil, Citrucel, or " Benefiber to bulk up stools. Try folding a gauze in the buttock crease and determine whether there is any fecal staining that could be contributing and to keep area dry, Calmoseptine cream or zinc cream, Avoid scratching to avoid further trauma, increase fiber and water in diet, diet modification-try avoiding spicy foods, caffeine, tomatoes, carbonated beverages, milk products, cheese, chocolate, nuts, etc., using wet wipes rather than toilet paper, try wearing only cotton underwear, and avoid perfume-containing soaps (can try Dove soap). Try these interventions for 6-8 weeks and return to clinic if symptoms persist.  She did have a prolapsing hemorrhoid this summer but this does not bother her.  Symptoms become recurrent, she can return for possible further banding. Patient's questions were answered to her stated satisfaction and she is in agreement with this plan.    Medical history:  Past Medical History:   Diagnosis Date     Eczema      Endometriosis       (normal spontaneous vaginal delivery)     x 2     Thyroid disease        Surgical history:  Past Surgical History:   Procedure Laterality Date     DAVINCI PELVIC PROCEDURE  2012    Procedure:DAVINCI PELVIC PROCEDURE; DAVINCI DIAGNOSTIC PELVISCOPY WITH OMNIGUIDE C02 LASER, DIAGNOSTIC HYSTEROSCOPY, EXTENSIVE LYSIS EXCISION OF ENDOMETRIOSIS, BILATERAL URETEROLYSIS, D & C; Surgeon:JOHN KELLY; Location: OR       Problem list:  Patient Active Problem List    Diagnosis Date Noted     Eczema, unspecified type 2019     Priority: Medium     Pruritic disorder 2019     Priority: Medium     Non-functioning gallbladder 2018     Priority: Medium     Gastroesophageal reflux disease without esophagitis 2017     Priority: Medium     Rhinoconjunctivitis 10/02/2017     Priority: Medium     Chronic sinusitis, unspecified location 10/02/2017     Priority: Medium     Indication for care in labor or delivery 2017     Priority:  Medium     Labor and delivery, indication for care 03/14/2017     Priority: Medium     Hashimoto's thyroiditis 12/24/2016     Priority: Medium     History of multiple miscarriages 03/01/2016     Priority: Medium     Thyroid disease, antepartum 09/19/2013     Priority: Medium     Elevated TSH 09/08/2013     Priority: Medium       Medications:  Current Outpatient Medications   Medication Sig Dispense Refill     fluticasone (FLONASE) 50 MCG/ACT nasal spray INSTILL 2 SPRAYS INTO BOTH NOSTRILS DAILY 16 mL 0     hydrocortisone (ANUSOL-HC) 2.5 % cream Place rectally 2 times daily as needed for hemorrhoids 30 g 1     hydrocortisone valerate (WEST-BELKYS) 0.2 % ointment Apply topically 2 times daily Behind ears, eyelids, and underarms 15 g 0     levothyroxine (SYNTHROID/LEVOTHROID) 75 MCG tablet Take 1 tablet (75 mcg) by mouth daily 90 tablet 3     PRENATAL VITAMINS PO Take 1 tablet by mouth daily.         pyrithione zinc (SELSUN BLUE/HEAD AND SHOULDERS) 1 % external shampoo On the days without Ketoconazole shampoo 240 mL 3     tacrolimus (PROTOPIC) 0.1 % external ointment Apply topically 2 times daily (Patient not taking: Reported on 7/29/2019) 100 g 3     triamcinolone (KENALOG) 0.1 % ointment Apply topically 2 times daily Avoid using on face. (Patient not taking: Reported on 7/29/2019) 60 g 0       Allergies:  No Known Allergies    Family history:  Family History   Problem Relation Age of Onset     Hypertension Mother      Cancer Other         liver cancer     Liver Cancer Maternal Grandmother 79     Hypertension Maternal Grandfather      Heart Disease Paternal Grandmother 62     Heart Disease Paternal Grandfather 60     Melanoma No family hx of      Skin Cancer No family hx of        Social history:  Social History     Tobacco Use     Smoking status: Never Smoker     Smokeless tobacco: Never Used   Substance Use Topics     Alcohol use: No     Marital status: .    Nursing Notes:   Connie Felder, EMT  10/23/2019  "12:25 PM  Signed  Chief Complaint   Patient presents with     Consult     Hemorrhiods.       Vitals:    10/23/19 1223   BP: 110/67   BP Location: Left arm   Patient Position: Sitting   Cuff Size: Adult Regular   Pulse: 80   SpO2: 99%   Weight: 144 lb 11.2 oz   Height: 5' 4.96\"     Body mass index is 24.11 kg/m .    DEMETRIO Arvizu     Total face to face time was 15 minutes, >50% counseling.    KASSANDRA TadeoC  Colon and Rectal Surgery  [facility]    "

## 2019-10-23 NOTE — NURSING NOTE
"Chief Complaint   Patient presents with     Consult     Hemorrhiods.       Vitals:    10/23/19 1223   BP: 110/67   BP Location: Left arm   Patient Position: Sitting   Cuff Size: Adult Regular   Pulse: 80   SpO2: 99%   Weight: 144 lb 11.2 oz   Height: 5' 4.96\"       Body mass index is 24.11 kg/m .      Connie Felder, EMT                      "

## 2019-10-23 NOTE — PATIENT INSTRUCTIONS
Follow up:    One month if symptoms unresolved.    Please call with any questions or concerns regarding your clinic visit today.    It is a pleasure being involved in your health care.    Contacts post-consultation depending on your need:    Radiology Appointments [phone]    Schedule Clinic Appointments [phone] # 1   M-F 7:30 - 5 pm    [facility] [phone]    Clinic Fax Number [phone]    Surgery Scheduling [phone]    My Chart is available 24 hours a day and is a secure way to access your records and communicate with your care team.  I strongly recommend signing up if you haven't already done so, if you are comfortable with computers.  If you would like to inquire about this or are having problems with My Chart access, you may call [phone] or go online at [email or web address].  Please allow at least 24 hours for a response and extra time on weekends and Holidays.          GUIDE FOR PATIENTS WITH PRURITUS ANI    Pruritus ani, literally, means  itchy anus . This condition can have numerous causes, which can be as simple as hemorrhoids or as complex as uncommon infections and skin diseases. The purpose of the history, physical exam, and lab studies we have done and ordered will be to narrow the diagnostic possibilities to assure that you are getting the right treatment. As a first step, there are a number of things that need to be started and continued for the next 3-4 weeks.     1. Diet. Multiple foods can cause or worsen inflammation and itchiness around the anus. These include: coffee, tea, soda, alcohol, citrus, tomatoes, berries, and spicy or heavily spiced foods. These should be reduced or eliminated, if possible, from your diet.     2. Stop ALL creams, ointments, and topical preparations that you apply to your anus. It is very important that you inform us of all medications your are currently taking, including antibiotics, pain killers, and over-the-counter or natural  medications. Occasionally (specially when the skin is very irritated), we will recommend a barrier cream, such as Calmoseptine or Desitin. Apply this 3-4 times per day.    3. We recommend daily bathing, preferably in a shower. Do not use medicated or perfumed soaps. Dove  is ideal as it is not soap and does not irritate your skin and do not use between buttocks. Washing-off the anus completely at the end of your shower is helpful to avoid any soap or shampoo residue on your skin. Also, using a hair dryer (on the cool air setting) is recommended to completely dry the perianal skin. If you have persistent leakage or a wet anus, we recommend placing a cotton ball or dry gauze and changing it as needed during the day. Loose, cotton undergarments are also recommended. Avoid recurrent and over-vigorous wiping after bowel movements. Unscented and non-medicated  wet-wipes  or  baby wipes  are preferred.    4. Start on a fiber supplement such as Citrucel 3 times a day with 8-10 glasses of water a day. This will allow for a soft and easy bowel movement and will help  soak up  the extra moisture in your colon and rectum.    After 3-4 weeks, we will re-evaluate your response and inform you of the results of your tests. The majority of patients with an  itchy anus  will have a good response to these initial steps. Persistent itchiness will require an additional cream or ointment that may have to be continued for 1-2 months. It is not rare that patients with an  itchy anus  have recurrent symptoms over their lifetime and may require intermittent treatment with creams or ointments. Do not think of this as a  failure . Recurrent symptoms tend to be mild and respond well to brief treatments. As with other chronic conditions, many patients learn how to avoid these symptoms by modifying their diet or being proactive about early treatment; and in a sense  learn how to live with occasional itchiness or irritation .    We hope this is  helpful for you. Mainly, so you understand how important these simple but key elements are for the success of your treatment.       Colorectal Surgery Team

## 2019-10-23 NOTE — PROGRESS NOTES
"Colon and Rectal Surgery Follow-Up Clinic Note    RE: Karen Beverly  : 1980  WALTER: 10/23/2019    Karen Beverly is a very pleasant 39 year old female here for follow-up of hemorrhoids.    Interval history: I saw Karen last May with hemorrhoid prolapse.  She states that she did well for period of time but the hemorrhoid started to come out again this summer and is very bothersome.  However, the past few months the hemorrhoid has not been bothering her as much.  She does, however, have fairly significant itching, especially at night.  She feels that her skin is tearing because of the itching.  She tried fiber tablets but did not use them consistently and is not currently on any bowel medications.  She feels that her bowel movements are slower and that she has difficulty emptying.  She often has to go back to the bathroom 3-4 times to fully empty.  She has some difficulty holding flatus but denies any fecal incontinence.   She had a colonoscopy in , which was normal.    Physical Examination: Exam was chaperoned by Connie Felder, EMT  /67 (BP Location: Left arm, Patient Position: Sitting, Cuff Size: Adult Regular)   Pulse 80   Ht 5' 4.96\"   Wt 144 lb 11.2 oz   SpO2 99%   BMI 24.11 kg/m    General: Alert, oriented, in no acute distress, sitting comfortably  HEENT: Mucous membranes moist  Perianal external examination:  Perianal skin with mild excoriation    Digital rectal examination: Was deferred.    Anoscopy: Was deferred.    Assessment/Plan: 39 year old female with pruritus ani. Discussed that this is likely due to leakage of moisture. Recommended a fiber supplement such as Metamucil, Citrucel, or Benefiber to bulk up stools. Try folding a gauze in the buttock crease and determine whether there is any fecal staining that could be contributing and to keep area dry, Calmoseptine cream or zinc cream, Avoid scratching to avoid further trauma, increase fiber and water in diet, diet " modification-try avoiding spicy foods, caffeine, tomatoes, carbonated beverages, milk products, cheese, chocolate, nuts, etc., using wet wipes rather than toilet paper, try wearing only cotton underwear, and avoid perfume-containing soaps (can try Dove soap). Try these interventions for 6-8 weeks and return to clinic if symptoms persist.  She did have a prolapsing hemorrhoid this summer but this does not bother her.  Symptoms become recurrent, she can return for possible further banding. Patient's questions were answered to her stated satisfaction and she is in agreement with this plan.    Medical history:  Past Medical History:   Diagnosis Date     Eczema      Endometriosis       (normal spontaneous vaginal delivery)     x 2     Thyroid disease        Surgical history:  Past Surgical History:   Procedure Laterality Date     DAVINCI PELVIC PROCEDURE  2012    Procedure:DAVINCI PELVIC PROCEDURE; DAVINCI DIAGNOSTIC PELVISCOPY WITH OMNIGUIDE C02 LASER, DIAGNOSTIC HYSTEROSCOPY, EXTENSIVE LYSIS EXCISION OF ENDOMETRIOSIS, BILATERAL URETEROLYSIS, D & C; Surgeon:JOHN KELLY; Location: OR       Problem list:  Patient Active Problem List    Diagnosis Date Noted     Eczema, unspecified type 2019     Priority: Medium     Pruritic disorder 2019     Priority: Medium     Non-functioning gallbladder 2018     Priority: Medium     Gastroesophageal reflux disease without esophagitis 2017     Priority: Medium     Rhinoconjunctivitis 10/02/2017     Priority: Medium     Chronic sinusitis, unspecified location 10/02/2017     Priority: Medium     Indication for care in labor or delivery 2017     Priority: Medium     Labor and delivery, indication for care 2017     Priority: Medium     Hashimoto's thyroiditis 2016     Priority: Medium     History of multiple miscarriages 2016     Priority: Medium     Thyroid disease, antepartum 2013     Priority: Medium     Elevated  "TSH 09/08/2013     Priority: Medium       Medications:  Current Outpatient Medications   Medication Sig Dispense Refill     fluticasone (FLONASE) 50 MCG/ACT nasal spray INSTILL 2 SPRAYS INTO BOTH NOSTRILS DAILY 16 mL 0     hydrocortisone (ANUSOL-HC) 2.5 % cream Place rectally 2 times daily as needed for hemorrhoids 30 g 1     hydrocortisone valerate (WEST-BELKYS) 0.2 % ointment Apply topically 2 times daily Behind ears, eyelids, and underarms 15 g 0     levothyroxine (SYNTHROID/LEVOTHROID) 75 MCG tablet Take 1 tablet (75 mcg) by mouth daily 90 tablet 3     PRENATAL VITAMINS PO Take 1 tablet by mouth daily.         pyrithione zinc (SELSUN BLUE/HEAD AND SHOULDERS) 1 % external shampoo On the days without Ketoconazole shampoo 240 mL 3     tacrolimus (PROTOPIC) 0.1 % external ointment Apply topically 2 times daily (Patient not taking: Reported on 7/29/2019) 100 g 3     triamcinolone (KENALOG) 0.1 % ointment Apply topically 2 times daily Avoid using on face. (Patient not taking: Reported on 7/29/2019) 60 g 0       Allergies:  No Known Allergies    Family history:  Family History   Problem Relation Age of Onset     Hypertension Mother      Cancer Other         liver cancer     Liver Cancer Maternal Grandmother 79     Hypertension Maternal Grandfather      Heart Disease Paternal Grandmother 62     Heart Disease Paternal Grandfather 60     Melanoma No family hx of      Skin Cancer No family hx of        Social history:  Social History     Tobacco Use     Smoking status: Never Smoker     Smokeless tobacco: Never Used   Substance Use Topics     Alcohol use: No     Marital status: .    Nursing Notes:   Connie Felder, EMT  10/23/2019 12:25 PM  Signed  Chief Complaint   Patient presents with     Consult     Hemorrhiods.       Vitals:    10/23/19 1223   BP: 110/67   BP Location: Left arm   Patient Position: Sitting   Cuff Size: Adult Regular   Pulse: 80   SpO2: 99%   Weight: 144 lb 11.2 oz   Height: 5' 4.96\"       Body " mass index is 24.11 kg/m .      DEMETRIO Arvizu                        Total face to face time was 15 minutes, >50% counseling.    ERNESTINA Tadeo  Colon and Rectal Surgery  [facility]

## 2019-11-07 ENCOUNTER — OFFICE VISIT (OUTPATIENT)
Dept: FAMILY MEDICINE | Facility: CLINIC | Age: 39
End: 2019-11-07
Payer: COMMERCIAL

## 2019-11-07 VITALS
WEIGHT: 143 LBS | OXYGEN SATURATION: 100 % | TEMPERATURE: 97.6 F | SYSTOLIC BLOOD PRESSURE: 100 MMHG | DIASTOLIC BLOOD PRESSURE: 61 MMHG | BODY MASS INDEX: 23.83 KG/M2 | HEART RATE: 79 BPM

## 2019-11-07 DIAGNOSIS — H92.01 OTALGIA, RIGHT: ICD-10-CM

## 2019-11-07 DIAGNOSIS — R42 VERTIGO: Primary | ICD-10-CM

## 2019-11-07 PROCEDURE — 99214 OFFICE O/P EST MOD 30 MIN: CPT | Performed by: FAMILY MEDICINE

## 2019-11-07 RX ORDER — AMOXICILLIN 500 MG/1
1000 CAPSULE ORAL 2 TIMES DAILY
Qty: 40 CAPSULE | Refills: 0 | Status: SHIPPED | OUTPATIENT
Start: 2019-11-07 | End: 2019-11-17

## 2019-11-07 ASSESSMENT — PAIN SCALES - GENERAL: PAINLEVEL: NO PAIN (0)

## 2019-11-07 NOTE — PROGRESS NOTES
Subjective     Karen Beverly is a 39 year old female who presents to clinic today for the following health issues:    HPI   Patient is here to follow up on dizziness. Stated that the dizziness is intermittent and is mostly at night and in the morning. Her balance is off when this happens and also feels nauseous. When she tries to fall asleep she will have uncontrolled body reflexes. Having ear pain in the right ear and has been taking Ibuprofen which seems to help a little bit.    Maddi Pereira MA      Dizziness  Onset: 4 months ago    Description:   Do you feel faint:  no   Does it feel like the surroundings (bed, room) are moving: YES  Unsteady/off balance: YES  Have you passed out or fallen: no     Intensity: moderate    Progression of Symptoms:  intermittent    Accompanying Signs & Symptoms:  Heart palpitations: no   Nausea, vomiting: no   Weakness in arms or legs: no   Fatigue: YES  Vision or speech changes: no   Ringing in ears (Tinnitus): YES  Hearing Loss: no     History:   Head trauma/concussion hx: no   Previous similar symptoms: YES  Recent bleeding history: no     Precipitating factors:   Worse with activity or head movement: YES- looking up  Any new medications (BP?): no   Alcohol/drug abuse/withdrawal: no     Alleviating factors:   Does staying in a fixed position give relief:  YES    Therapies Tried and outcome: none    Patient is also noticed some jerking of her arms and legs when she is close to falling asleep.  That does not occur during the day.  She has good days and bad days probably about 50% of each.  She describes a sensation of movement or veering off to one side while she is walking.  She describes an ocean sound in her ears consistent with tinnitus as well as some discomfort in the right ear which she feels is can a deep inside and points to the area of the mastoid air cells.  Chewing does not affect this.    Patient Active Problem List   Diagnosis     Elevated TSH     Thyroid  disease, antepartum     History of multiple miscarriages     Hashimoto's thyroiditis     Indication for care in labor or delivery     Labor and delivery, indication for care     Rhinoconjunctivitis     Chronic sinusitis, unspecified location     Gastroesophageal reflux disease without esophagitis     Non-functioning gallbladder     Eczema, unspecified type     Pruritic disorder     Past Surgical History:   Procedure Laterality Date     DAVINCI PELVIC PROCEDURE  2/22/2012    Procedure:DAVINCI PELVIC PROCEDURE; DAVINCI DIAGNOSTIC PELVISCOPY WITH OMNIGUIDE C02 LASER, DIAGNOSTIC HYSTEROSCOPY, EXTENSIVE LYSIS EXCISION OF ENDOMETRIOSIS, BILATERAL URETEROLYSIS, D & C; Surgeon:JOHN KELLY; Location: OR       Social History     Tobacco Use     Smoking status: Never Smoker     Smokeless tobacco: Never Used   Substance Use Topics     Alcohol use: No     Family History   Problem Relation Age of Onset     Hypertension Mother      Cancer Other         liver cancer     Liver Cancer Maternal Grandmother 79     Hypertension Maternal Grandfather      Heart Disease Paternal Grandmother 62     Heart Disease Paternal Grandfather 60     Melanoma No family hx of      Skin Cancer No family hx of          Current Outpatient Medications   Medication Sig Dispense Refill     amoxicillin (AMOXIL) 500 MG capsule Take 2 capsules (1,000 mg) by mouth 2 times daily for 10 days 40 capsule 0     fluticasone (FLONASE) 50 MCG/ACT nasal spray INSTILL 2 SPRAYS INTO BOTH NOSTRILS DAILY 16 mL 0     hydrocortisone (ANUSOL-HC) 2.5 % cream Place rectally 2 times daily as needed for hemorrhoids 30 g 1     hydrocortisone valerate (WEST-BELKYS) 0.2 % ointment Apply topically 2 times daily Behind ears, eyelids, and underarms 15 g 0     levothyroxine (SYNTHROID/LEVOTHROID) 75 MCG tablet Take 1 tablet (75 mcg) by mouth daily 90 tablet 3     PRENATAL VITAMINS PO Take 1 tablet by mouth daily.         pyrithione zinc (SELSUN BLUE/HEAD AND SHOULDERS) 1 %  external shampoo On the days without Ketoconazole shampoo 240 mL 3     tacrolimus (PROTOPIC) 0.1 % external ointment Apply topically 2 times daily (Patient not taking: Reported on 7/29/2019) 100 g 3     triamcinolone (KENALOG) 0.1 % ointment Apply topically 2 times daily Avoid using on face. (Patient not taking: Reported on 7/29/2019) 60 g 0     No Known Allergies    Reviewed and updated as needed this visit by Provider  Tobacco  Problems  Med Hx  Surg Hx  Fam Hx  Soc Hx        Review of Systems   ROS COMP: Constitutional, HEENT, cardiovascular, pulmonary, gi and gu systems are negative, except as otherwise noted.      Objective    /61 (BP Location: Right arm, Patient Position: Chair, Cuff Size: Adult Regular)   Pulse 79   Temp 97.6  F (36.4  C) (Oral)   Wt 64.9 kg (143 lb)   LMP 10/15/2019   SpO2 100%   BMI 23.83 kg/m    Body mass index is 23.83 kg/m .  Physical Exam   GENERAL: healthy, alert and no distress  EYES: Eyes grossly normal to inspection, PERRL and conjunctivae and sclerae normal  HENT: ear canals and TM's normal, nose and mouth without ulcers or lesions  NECK: no adenopathy, no asymmetry, masses, or scars and thyroid normal to palpation  RESP: lungs clear to auscultation - no rales, rhonchi or wheezes  CV: regular rate and rhythm, normal S1 S2, no S3 or S4, no murmur, click or rub, no peripheral edema and peripheral pulses strong  MS: no gross musculoskeletal defects noted, no edema  SKIN: no suspicious lesions or rashes  NEURO: Normal strength and tone, sensory exam grossly normal, mentation intact, speech normal, cranial nerves 2-12 intact and DTR's normal and symmetric 2+  PSYCH: mentation appears normal, affect normal/bright    Diagnostic Test Results:  Labs reviewed in Epic        Assessment & Plan     1. Vertigo  Patient did express a concern that she is worried a little bit about her symptoms being related to cancer.  I did discuss with her the possibility of getting an MRI which  could definitively rule out any type of intracranial mass causing her symptoms and have put that order in today.  She may have some component of mastoiditis on the right side given the location of her discomfort in the normal visualized portion of the middle ear today.  For that I suggested treatment with amoxicillin as noted below and if everything resolves then she could skip the MRI.  If symptoms continue that I suggested an MRI and ENT referral as noted below for an opinion regarding her current symptoms.  - MR Brain w/o Contrast; Future    2. Otalgia, right  As above.  - OTOLARYNGOLOGY REFERRAL  - amoxicillin (AMOXIL) 500 MG capsule; Take 2 capsules (1,000 mg) by mouth 2 times daily for 10 days  Dispense: 40 capsule; Refill: 0           Return in about 3 weeks (around 11/28/2019) for Routine Visit.    Jean Krishnamurthy MD  [facility]

## 2019-11-14 ENCOUNTER — ANCILLARY PROCEDURE (OUTPATIENT)
Dept: MRI IMAGING | Facility: CLINIC | Age: 39
End: 2019-11-14
Attending: FAMILY MEDICINE
Payer: COMMERCIAL

## 2019-11-14 DIAGNOSIS — R42 VERTIGO: ICD-10-CM

## 2019-11-14 PROCEDURE — 70553 MRI BRAIN STEM W/O & W/DYE: CPT | Mod: TC

## 2019-11-14 PROCEDURE — A9585 GADOBUTROL INJECTION: HCPCS | Mod: JW

## 2019-11-14 RX ORDER — GADOBUTROL 604.72 MG/ML
7.5 INJECTION INTRAVENOUS ONCE
Status: COMPLETED | OUTPATIENT
Start: 2019-11-14 | End: 2019-11-14

## 2019-11-14 RX ADMIN — GADOBUTROL 6.5 ML: 604.72 INJECTION INTRAVENOUS at 11:29

## 2020-03-02 ENCOUNTER — HEALTH MAINTENANCE LETTER (OUTPATIENT)
Age: 40
End: 2020-03-02

## 2020-06-17 DIAGNOSIS — E06.3 HASHIMOTO'S THYROIDITIS: ICD-10-CM

## 2020-06-21 RX ORDER — LEVOTHYROXINE SODIUM 75 UG/1
75 TABLET ORAL DAILY
Qty: 90 TABLET | Refills: 0 | Status: SHIPPED | OUTPATIENT
Start: 2020-06-21 | End: 2020-09-16

## 2020-09-13 DIAGNOSIS — E06.3 HASHIMOTO'S THYROIDITIS: ICD-10-CM

## 2020-09-16 RX ORDER — LEVOTHYROXINE SODIUM 75 UG/1
75 TABLET ORAL DAILY
Qty: 90 TABLET | Refills: 0 | Status: SHIPPED | OUTPATIENT
Start: 2020-09-16 | End: 2020-10-22

## 2020-09-16 ASSESSMENT — ENCOUNTER SYMPTOMS
JOINT SWELLING: 0
MUSCLE WEAKNESS: 0
RECTAL PAIN: 0
DYSURIA: 0
BLOATING: 1
DIARRHEA: 0
JAUNDICE: 0
STIFFNESS: 0
BREAST PAIN: 1
SINUS PAIN: 0
HEARTBURN: 1
FATIGUE: 1
FLANK PAIN: 0
BACK PAIN: 0
HOARSE VOICE: 1
CHILLS: 0
DIFFICULTY URINATING: 0
TASTE DISTURBANCE: 0
MYALGIAS: 0
WEIGHT GAIN: 1
SINUS CONGESTION: 1
ALTERED TEMPERATURE REGULATION: 0
BLOOD IN STOOL: 0
TROUBLE SWALLOWING: 0
HALLUCINATIONS: 0
FEVER: 0
ABDOMINAL PAIN: 1
ARTHRALGIAS: 1
NECK PAIN: 0
BREAST MASS: 0
MUSCLE CRAMPS: 0
INCREASED ENERGY: 1
NAUSEA: 1
HEMATURIA: 0
VOMITING: 0
POLYPHAGIA: 0
WEIGHT LOSS: 0
POLYDIPSIA: 0
DECREASED APPETITE: 0
CONSTIPATION: 1
BOWEL INCONTINENCE: 0
NECK MASS: 0
NIGHT SWEATS: 0
SORE THROAT: 0
SMELL DISTURBANCE: 0

## 2020-09-16 NOTE — TELEPHONE ENCOUNTER
Last Clinic Visit: with primary care 2/18/19, [place] 9/28/20  Last clinic visit with endocrinology 7/15/19 with clinic note reflecting:  Clinically, the patient does not endorse definite signs or symptoms suggestive of hypothyroidism. We are going to check the thyroid hormone levels today and adjust the dose of levothyroxine accordingly. She may follow with her PCP in the future.   90 day refill provided per primary care provider, routed to clinic for followup

## 2020-09-28 ENCOUNTER — PATIENT OUTREACH (OUTPATIENT)
Dept: SURGERY | Facility: CLINIC | Age: 40
End: 2020-09-28

## 2020-09-28 ENCOUNTER — OFFICE VISIT (OUTPATIENT)
Dept: FAMILY MEDICINE | Facility: CLINIC | Age: 40
End: 2020-09-28
Payer: COMMERCIAL

## 2020-09-28 VITALS
WEIGHT: 151.6 LBS | DIASTOLIC BLOOD PRESSURE: 71 MMHG | BODY MASS INDEX: 25.88 KG/M2 | HEIGHT: 64 IN | SYSTOLIC BLOOD PRESSURE: 110 MMHG | HEART RATE: 92 BPM | OXYGEN SATURATION: 100 %

## 2020-09-28 DIAGNOSIS — R73.9 HYPERGLYCEMIA: ICD-10-CM

## 2020-09-28 DIAGNOSIS — K82.8 NON-FUNCTIONING GALLBLADDER: ICD-10-CM

## 2020-09-28 DIAGNOSIS — B49 FUNGAL INFECTION: ICD-10-CM

## 2020-09-28 DIAGNOSIS — N64.52 BREAST DISCHARGE: ICD-10-CM

## 2020-09-28 DIAGNOSIS — E06.3 HASHIMOTO'S THYROIDITIS: ICD-10-CM

## 2020-09-28 DIAGNOSIS — R63.5 WEIGHT GAIN: ICD-10-CM

## 2020-09-28 DIAGNOSIS — Z00.00 ROUTINE HISTORY AND PHYSICAL EXAMINATION OF ADULT: Primary | ICD-10-CM

## 2020-09-28 LAB
ALBUMIN SERPL-MCNC: 4.1 G/DL (ref 3.4–5)
ALP SERPL-CCNC: 43 U/L (ref 40–150)
ALT SERPL W P-5'-P-CCNC: 20 U/L (ref 0–50)
ANION GAP SERPL CALCULATED.3IONS-SCNC: 6 MMOL/L (ref 3–14)
AST SERPL W P-5'-P-CCNC: 9 U/L (ref 0–45)
BASOPHILS # BLD AUTO: 0 10E9/L (ref 0–0.2)
BASOPHILS NFR BLD AUTO: 0.6 %
BILIRUB SERPL-MCNC: 0.3 MG/DL (ref 0.2–1.3)
BUN SERPL-MCNC: 11 MG/DL (ref 7–30)
CALCIUM SERPL-MCNC: 9.2 MG/DL (ref 8.5–10.1)
CHLORIDE SERPL-SCNC: 105 MMOL/L (ref 94–109)
CO2 SERPL-SCNC: 27 MMOL/L (ref 20–32)
CREAT SERPL-MCNC: 0.57 MG/DL (ref 0.52–1.04)
DIFFERENTIAL METHOD BLD: NORMAL
EOSINOPHIL # BLD AUTO: 0.2 10E9/L (ref 0–0.7)
EOSINOPHIL NFR BLD AUTO: 4.1 %
ERYTHROCYTE [DISTWIDTH] IN BLOOD BY AUTOMATED COUNT: 11.9 % (ref 10–15)
GFR SERPL CREATININE-BSD FRML MDRD: >90 ML/MIN/{1.73_M2}
GLUCOSE SERPL-MCNC: 96 MG/DL (ref 70–99)
HBA1C MFR BLD: 5.4 % (ref 0–5.6)
HCT VFR BLD AUTO: 38.8 % (ref 35–47)
HGB BLD-MCNC: 12.5 G/DL (ref 11.7–15.7)
IMM GRANULOCYTES # BLD: 0 10E9/L (ref 0–0.4)
IMM GRANULOCYTES NFR BLD: 0.2 %
LYMPHOCYTES # BLD AUTO: 1.4 10E9/L (ref 0.8–5.3)
LYMPHOCYTES NFR BLD AUTO: 27.6 %
MCH RBC QN AUTO: 31.1 PG (ref 26.5–33)
MCHC RBC AUTO-ENTMCNC: 32.2 G/DL (ref 31.5–36.5)
MCV RBC AUTO: 97 FL (ref 78–100)
MONOCYTES # BLD AUTO: 0.3 10E9/L (ref 0–1.3)
MONOCYTES NFR BLD AUTO: 6.9 %
NEUTROPHILS # BLD AUTO: 3 10E9/L (ref 1.6–8.3)
NEUTROPHILS NFR BLD AUTO: 60.6 %
NRBC # BLD AUTO: 0 10*3/UL
NRBC BLD AUTO-RTO: 0 /100
PLATELET # BLD AUTO: 267 10E9/L (ref 150–450)
POTASSIUM SERPL-SCNC: 4.1 MMOL/L (ref 3.4–5.3)
PROLACTIN SERPL-MCNC: 4 UG/L (ref 3–27)
PROT SERPL-MCNC: 7.7 G/DL (ref 6.8–8.8)
RBC # BLD AUTO: 4.02 10E12/L (ref 3.8–5.2)
SODIUM SERPL-SCNC: 138 MMOL/L (ref 133–144)
T3 SERPL-MCNC: 100 NG/DL (ref 60–181)
T4 FREE SERPL-MCNC: 1.04 NG/DL (ref 0.76–1.46)
TSH SERPL DL<=0.005 MIU/L-ACNC: 1.41 MU/L (ref 0.4–4)
WBC # BLD AUTO: 4.9 10E9/L (ref 4–11)

## 2020-09-28 RX ORDER — CLOTRIMAZOLE 1 %
CREAM (GRAM) TOPICAL 2 TIMES DAILY
Status: ON HOLD | COMMUNITY
End: 2021-02-05

## 2020-09-28 RX ORDER — CLOTRIMAZOLE 1 %
CREAM (GRAM) TOPICAL 2 TIMES DAILY PRN
Qty: 45 G | Refills: 1 | Status: SHIPPED | OUTPATIENT
Start: 2020-09-28 | End: 2021-02-03

## 2020-09-28 ASSESSMENT — ANXIETY QUESTIONNAIRES
7. FEELING AFRAID AS IF SOMETHING AWFUL MIGHT HAPPEN: MORE THAN HALF THE DAYS
IF YOU CHECKED OFF ANY PROBLEMS ON THIS QUESTIONNAIRE, HOW DIFFICULT HAVE THESE PROBLEMS MADE IT FOR YOU TO DO YOUR WORK, TAKE CARE OF THINGS AT HOME, OR GET ALONG WITH OTHER PEOPLE: SOMEWHAT DIFFICULT
GAD7 TOTAL SCORE: 9
5. BEING SO RESTLESS THAT IT IS HARD TO SIT STILL: NOT AT ALL
3. WORRYING TOO MUCH ABOUT DIFFERENT THINGS: MORE THAN HALF THE DAYS
2. NOT BEING ABLE TO STOP OR CONTROL WORRYING: NOT AT ALL
1. FEELING NERVOUS, ANXIOUS, OR ON EDGE: MORE THAN HALF THE DAYS
6. BECOMING EASILY ANNOYED OR IRRITABLE: MORE THAN HALF THE DAYS

## 2020-09-28 ASSESSMENT — PATIENT HEALTH QUESTIONNAIRE - PHQ9
5. POOR APPETITE OR OVEREATING: SEVERAL DAYS
SUM OF ALL RESPONSES TO PHQ QUESTIONS 1-9: 6

## 2020-09-28 ASSESSMENT — PAIN SCALES - GENERAL: PAINLEVEL: NO PAIN (0)

## 2020-09-28 ASSESSMENT — MIFFLIN-ST. JEOR: SCORE: 1332.65

## 2020-09-28 NOTE — NURSING NOTE
Chief Complaint   Patient presents with     Physical     Pt comes in for physical exam      DEMETRIO Waters at 10:53 AM sign on 9/28/2020

## 2020-09-28 NOTE — PATIENT INSTRUCTIONS
[facility] Medication Refill Request Information:  * Please contact your pharmacy regarding ANY request for medication refills.  ** [facility] Prescription Fax = [phone]  * Please allow 3 business days for routine medication refills.  * Please allow 5 business days for controlled substance medication refills.     [facility] Test Result notification information:  *You will be notified with in 7-10 days of your appointment day regarding the results of your test.  If you are on MyChart you will be notified as soon as the provider has reviewed the results and signed off on them.    Primary Care Center: [phone]     General Surgery [phone] (4th Floor [facility])     Mammogram Diagnostic [facility]: [phone]

## 2020-09-28 NOTE — PROGRESS NOTES
A referral was placed for this patient to consult with the General Surgery Team regarding gallbladder dysfunction.  Attempted to reach patient with no answer. LM on VM to call scheduling line.

## 2020-09-28 NOTE — PROGRESS NOTES
[facility]  Primary [facility] Center   Lars Burt MD  2020     Chief Complaint:   Physical   Follow-up on Gallbladder   Breast drainage and tenderness  Anxiety and depression      History of Present Illness:   Karen Beverly is a 40 year old female with a history of endometriosis, Hashimoto's thyroiditis, and non-functioning gallbladder who presents for physical.  She also has multiple medical concerns    Abdominal pain secondary to Gallbladder  She has intermittent abdominal pain and delayed further management of her nonfunctioning gallbladder due to pandemic.  She is wondering if she should meet with surgeon for next steps wondering if this could be virtual  Karen had a NM Hepatobiliary scan on 18 for further evaluation of ongoing abdominal pain (see below; see my note from 2018 for full summary). Liver hemangioma has been stable throughout imaging.  Her grandmother  after complications from gallbladder surgery and had GI cancer.    Examination:  NM HEPATOBILIARY SCAN WITH GB EF 2019                                                Impression:  1. No evidence of cystic duct obstruction.  2. Decreased gallbladder ejection fraction suggesting functional gall  bladder disorder.   Seasonal allergies  She has seasonal allergy symptoms that are present from May through winter freeze.  She is tried different things Allegra Claritin recently started Zyrtec with some improvement.  She notes that when she is gardening her symptoms are worse.  She utilized Flonase but initially this caused pain on the nasal septum I reviewed she could install the spray with the opposite hand to avoid trauma to the nasal septum.  Possible depression anxiety  She notes symptoms of anxiety and depression times when she does not feel like she wants to interact with her family although she was able to do this all summer but has fluctuating mood.  She indicates when she has these ups and downs she is not  certain if it is related to hormones or other concern such as worry about her chronic conditions.  She has noted abdominal weight gain and urinary frequency previously had a mildly borderline A1c suggesting prediabetes.  She is not certain if this could be contributing to her symptoms and would like to wait until lab results come back before we consider medication for depression anxiety.  She also has a history of thyroid inflammation Hashimoto's and is wondering if her thyroid function labs could be checked as she may need adjust of her dose of levothyroxine.    Breast fullness and drainage  She notes her breast sometimes feel tender and although she stopped breast-feeding approximately 1 year ago occasionally she will note a breast discharge that sometimes is white to yellow.  Eczema  Her eczema is currently stable but she notes a rash under her left axilla which is slightly itchy.  She did get clotrimazole cream that may be outdated and is wondering if she could get a refill as this seems to assist.  She had vertigo in October after long trip from [place] with a normal MRI evaluation      Healthcare Maintenance   Pap (females age 21 - 65): every 3 years, every 5 years after age 35 if cotesting done -   PAP 2/2019 NIL hpv neg one abnormal pap in past  Lab Results   Component Value Date    PAP ASC-US 02/01/2016   Glucose: every 5 years, yearly if risk factors? - recommend  Lipid panel: every 5 years, yearly if risk factors - if A1C abnormal would check not fasting today  HIV (ages 13 - 64): once per lifetime, yearly if MSM or other risk factors - Up to Date 2012  Immunizations (Tetanus every 10 years, Influenza yearly, Pneumonia PPV-23 and PCV-13 age ? 65 or sooner if risk factors) - Up to Date   Immunization History   Administered Date(s) Administered     TDAP Vaccine (Boostrix) 01/05/2017      The following health maintenance issues were also reviewed and addressed as needed:  Blood pressure (>18 years  annually)  Lifestyle habits (including exercise, diet, weight)  Health risk behaviors (sexual history, alcohol, tobacco, drug use, partner violence)  Routine eye, dental, hearing, and skin exams  Advanced directives    Review of Systems:   Pertinent items are noted in HPI and below, remainder of complete ROS is negative.      Answers for HPI/ROS submitted by the patient on 9/16/2020   General Symptoms: Yes  Skin Symptoms: No  HENT Symptoms: Yes  EYE SYMPTOMS: No  HEART SYMPTOMS: No  LUNG SYMPTOMS: No  INTESTINAL SYMPTOMS: Yes  URINARY SYMPTOMS: Yes  GYNECOLOGIC SYMPTOMS: No  BREAST SYMPTOMS: Yes  SKELETAL SYMPTOMS: Yes  BLOOD SYMPTOMS: No  NERVOUS SYSTEM SYMPTOMS: No  MENTAL HEALTH SYMPTOMS: No  Fever: No  Loss of appetite: No  Weight loss: No  Weight gain: Yes  Fatigue: Yes  Night sweats: No  Chills: No  Increased stress: Yes  Excessive hunger: No  Excessive thirst: No  Feeling hot or cold when others believe the temperature is normal: No  Loss of height: No  Post-operative complications: No  Surgical site pain: No  Hallucinations: No  Change in or Loss of Energy: Yes  Hyperactivity: No  Confusion: No  Ear pain: No  Ear discharge: No  Hearing loss: No  Tinnitus: No  Nosebleeds: No  Congestion: Yes  Sinus pain: No  Trouble swallowing: No   Voice hoarseness: Yes  Mouth sores: No  Sore throat: No  Tooth pain: No  Gum tenderness: No  Bleeding gums: No  Change in taste: No  Change in sense of smell: No  Dry mouth: No  Hearing aid used: No  Neck lump: No  Heart burn or indigestion: Yes  Nausea: Yes  Vomiting: No  Abdominal pain: Yes  Bloating: Yes  Constipation: Yes  Diarrhea: No  Blood in stool: No  Black stools: No  Rectal or Anal pain: No  Fecal incontinence: No  Yellowing of skin or eyes: No  Vomit with blood: No  Change in stools: No  Trouble holding urine or incontinence: No  Pain or burning: No  Trouble starting or stopping: Yes  Increased frequency of urination: No  Blood in urine: No  Decreased frequency of  urination: No  Frequent nighttime urination: Yes  Flank pain: No  Difficulty emptying bladder: No  Back pain: No  Muscle aches: No  Neck pain: No  Swollen joints: No  Joint pain: Yes  Bone pain: No  Muscle cramps: No  Muscle weakness: No  Joint stiffness: No  Bone fracture: No  Discharge: Yes  Lumps: No  Pain: Yes  Nipple retraction: No      Active Medications:    Current Outpatient Medications   Medication Sig Dispense Refill            clotrimazole (LOTRIMIN) 1 % external cream Apply topically 2 times daily as needed Fungal rash axilla 45 g 1     fluticasone (FLONASE) 50 MCG/ACT nasal spray INSTILL 2 SPRAYS INTO BOTH NOSTRILS DAILY 16 mL 0     hydrocortisone (ANUSOL-HC) 2.5 % cream Place rectally 2 times daily as needed for hemorrhoids 30 g 1     hydrocortisone valerate (WEST-BELKYS) 0.2 % ointment Apply topically 2 times daily Behind ears, eyelids, and underarms 15 g 0     levothyroxine (SYNTHROID/LEVOTHROID) 75 MCG tablet Take 1 tablet (75 mcg) by mouth daily 90 tablet 0     PRENATAL VITAMINS PO Take 1 tablet by mouth daily.         pyrithione zinc (SELSUN BLUE/HEAD AND SHOULDERS) 1 % external shampoo On the days without Ketoconazole shampoo 240 mL 3     tacrolimus (PROTOPIC) 0.1 % external ointment Apply topically 2 times daily (Patient not taking: Reported on 7/29/2019) 100 g 3     triamcinolone (KENALOG) 0.1 % ointment Apply topically 2 times daily Avoid using on face. (Patient not taking: Reported on 7/29/2019) 60 g 0    OTC zyrtec  Allergies:   Augmentin and Latex      Past Medical History:  Eczema   Endometriosis   Thyroid disease, antepartum   History of multiple miscarriages   Hashimoto's thyroiditis   Indication for care in labor or delivery   Rhinoconjunctivitis   Chronic sinusitis   Gastroesophageal reflux disease   Non-functioning gallbladder   Seasonal allergies  Past Surgical History:  DaVinci pelvic procedure     Family History:   Mother - hypertension   Maternal grandmother - liver cancer  "(), gallbladder disease s/p cholecystectomy   Maternal grandfather - hypertension   Paternal grandmother - heart disease  Paternal grandfather - heart disease       Social History:   The patient was alone.  Smoking Status: Never   Smokeless Tobacco: Never   Alcohol Use: No    Marital Status:     Home: Karen is originally from the [place] of [place], and now lives in [place] with her  and two children daughter  and son.   Physical Exam:   /71 (BP Location: Right arm, Patient Position: Sitting, Cuff Size: Adult Regular)   Pulse 92   Ht 1.626 m (5' 4\")   Wt 68.8 kg (151 lb 9.6 oz)   LMP 2020   SpO2 100%   Breastfeeding No   BMI 26.02 kg/m    General:  Pleasant, alert, no acute distress multiple concerns  EYES: Eyes grossly normal to inspection, PERRL and conjunctivae and sclerae normal  HENT: ear canals and TM's normal, nose and mouth without ulcers pale swollen turbinates allergy related, oropharynx clear and oral mucous membranes moist  NECK: no adenopathy, no asymmetry, masses, or scars and thyroid normal to palpation  Lungs:  Clear to auscultation throughout, no wheezes, rhonchi or rales.  C/V:  Regular rate and rhythm, no murmurs, rubs or gallops.  No JVD, no carotid bruits.  No peripheral edema.    BREAST: normal without masses, tenderness or nipple discharge and no palpable axillary masses or adenopathy    Abdomen:  Not distended.  Bowel sounds active.  No tenderness, no hepatosplenomegaly or masses.  No CVA tenderness or masses. Negative barajas's sign, liver and spleen normal.    (female): No required  Lymph:  No cervical, axillary or inguinal lymph nodes.  Neuro:   Face symmetric. No tremor.  Gait steady.   M/S:  No joint deformities noted.  No joint swelling.  Skin:  No jaundice.  Normal moisture, good skin turgor. She has an erythematous rash in left axilla  Psych:  Broad range affect.  Multiple healh concerns Some anxiety.     PHQ 2020   PHQ-9 Total Score 6   Q9: " Thoughts of better off dead/self-harm past 2 weeks Not at all     CARLIN-7 SCORE 9/28/2020   Total Score 9   Results for GIOVANNI LEE (MRN [id number]) as of 9/28/2020 19:44   Ref. Range 9/28/2020 12:23   Sodium Latest Ref Range: 133 - 144 mmol/L 138   Potassium Latest Ref Range: 3.4 - 5.3 mmol/L 4.1   Chloride Latest Ref Range: 94 - 109 mmol/L 105   Carbon Dioxide Latest Ref Range: 20 - 32 mmol/L 27   Urea Nitrogen Latest Ref Range: 7 - 30 mg/dL 11   Creatinine Latest Ref Range: 0.52 - 1.04 mg/dL 0.57   GFR Estimate Latest Ref Range: >60 mL/min/1.73_m2 >90   GFR Estimate If Black Latest Ref Range: >60 mL/min/1.73_m2 >90   Calcium Latest Ref Range: 8.5 - 10.1 mg/dL 9.2   Anion Gap Latest Ref Range: 3 - 14 mmol/L 6   Albumin Latest Ref Range: 3.4 - 5.0 g/dL 4.1   Protein Total Latest Ref Range: 6.8 - 8.8 g/dL 7.7   Bilirubin Total Latest Ref Range: 0.2 - 1.3 mg/dL 0.3   Alkaline Phosphatase Latest Ref Range: 40 - 150 U/L 43   ALT Latest Ref Range: 0 - 50 U/L 20   AST Latest Ref Range: 0 - 45 U/L 9   Hemoglobin A1C Latest Ref Range: 0 - 5.6 % 5.4   Prolactin Latest Ref Range: 3 - 27 ug/L 4   T4 Free Latest Ref Range: 0.76 - 1.46 ng/dL 1.04   Triiodothyronine (T3) Latest Ref Range: 60 - 181 ng/dL 100   TSH Latest Ref Range: 0.40 - 4.00 mU/L 1.41   Results for GIOVANNI LEE (MRN [id number]) as of 9/28/2020 19:44   Ref. Range 9/28/2020 12:23   Glucose Latest Ref Range: 70 - 99 mg/dL 96   WBC Latest Ref Range: 4.0 - 11.0 10e9/L 4.9   Hemoglobin Latest Ref Range: 11.7 - 15.7 g/dL 12.5   Hematocrit Latest Ref Range: 35.0 - 47.0 % 38.8   Platelet Count Latest Ref Range: 150 - 450 10e9/L 267   RBC Count Latest Ref Range: 3.8 - 5.2 10e12/L 4.02   MCV Latest Ref Range: 78 - 100 fl 97   MCH Latest Ref Range: 26.5 - 33.0 pg 31.1   MCHC Latest Ref Range: 31.5 - 36.5 g/dL 32.2   RDW Latest Ref Range: 10.0 - 15.0 % 11.9   Diff Method Unknown Automated Method   % Neutrophils Latest Units: % 60.6   % Lymphocytes Latest Units: %  27.6   % Monocytes Latest Units: % 6.9   % Eosinophils Latest Units: % 4.1   % Basophils Latest Units: % 0.6   % Immature Granulocytes Latest Units: % 0.2   Nucleated RBCs Latest Ref Range: 0 /100 0   Absolute Neutrophil Latest Ref Range: 1.6 - 8.3 10e9/L 3.0   Absolute Lymphocytes Latest Ref Range: 0.8 - 5.3 10e9/L 1.4   Absolute Monocytes Latest Ref Range: 0.0 - 1.3 10e9/L 0.3   Absolute Eosinophils Latest Ref Range: 0.0 - 0.7 10e9/L 0.2   Absolute Basophils Latest Ref Range: 0.0 - 0.2 10e9/L 0.0   Abs Immature Granulocytes Latest Ref Range: 0 - 0.4 10e9/L 0.0   Absolute Nucleated RBC Unknown 0.0   Prolactin Latest Ref Range: 3 - 27 ug/L 4     Assessment and Plan:  Karen Beverly is a 40 year old female with a history of endometriosis, Hashimoto's thyroiditis, and  non-functioning gallbladder who presents for physical.  She has multiple concerns.     Routine history and physical examination of adult  Healthcare maintenance updated and concerns discussed with plans as below. Full exam completed as above. Encouraged physical activity also to assist with mood.  She would like to get flu vaccine through pharmacy, deferred today   Non-functioning gallbladder  NM hepatobiliary scan  showed stable hemangioma and findings consistent with non-functioning gallbladder. I recommended surgical consult     -     CBC with platelets differential; Future  -     Comprehensive metabolic panel; Future  -     GENERAL SURG ADULT REFERRAL; Future    Hashimoto's thyroiditis  May be contributing to hr fluctuating mood check labs  -     TSH; Future  -     T4 free; Future  -     T3 total; Future    Breast discharge not present with exam today  -     Prolactin; Future  -     Mammogram, diagnostic w [facility] (3D); Future    Hyperglycemia  -     Hemoglobin A1c; Future    Weight gain  -     Hemoglobin A1c; Future    Fungal infection left axilla  -     clotrimazole (LOTRIMIN) 1 % external cream; Apply topically 2 times daily as needed Fungal rash  axilla     Seasonal allergies  She has Zyrtec and flonase at home, continue    Follow-up: Return in about 1 month virtual to review next steps related to mood after results       Lars Burt MD

## 2020-09-29 ASSESSMENT — ANXIETY QUESTIONNAIRES: GAD7 TOTAL SCORE: 9

## 2020-09-30 ENCOUNTER — PATIENT OUTREACH (OUTPATIENT)
Dept: SURGERY | Facility: CLINIC | Age: 40
End: 2020-09-30

## 2020-09-30 NOTE — PROGRESS NOTES
A referral was placed for this patient to consult with the General Surgery Team regarding gallbladder dysfunction.  Spoke with patient and a consultation is arranged with Dr. Olman Goss on 10/13 at [address] [place] [address]..

## 2020-10-01 NOTE — TELEPHONE ENCOUNTER
REFERRAL INFORMATION:    Referring Provider:  Dr. Lars Burt     Referring Clinic:  [facility]     Reason for Visit/Diagnosis: Non-functioning Gallbladder       FUTURE VISIT INFORMATION:    Appointment Date: 10/13/2020    Appointment Time: 10 AM     NOTES RECORD STATUS  DETAILS   OFFICE NOTE from Referring Provider Internal 9/28/2020, 2/18/19 Office visit with Dr. Burt     12/23/18 Telephone Encounter (Referral)    OFFICE NOTE from Other Specialists N/A    HOSPITAL DISCHARGE SUMMARY/ ED VISITS  N/A    OPERATIVE REPORT N/A    ENDOSCOPY (EGD)  N/A    PERTINENT LABS Internal    PATHOLOGY REPORTS (RELATED) N/A    IMAGING (CT, MRI, US, XR)  Internal NM Hepatobiliary Scan: 12/20/18  US Abdomen: 3/19/18, 12/5/17

## 2020-10-02 ENCOUNTER — TELEPHONE (OUTPATIENT)
Dept: FAMILY MEDICINE | Facility: CLINIC | Age: 40
End: 2020-10-02

## 2020-10-13 ENCOUNTER — PRE VISIT (OUTPATIENT)
Dept: SURGERY | Facility: CLINIC | Age: 40
End: 2020-10-13

## 2020-10-19 ENCOUNTER — PRE VISIT (OUTPATIENT)
Dept: SURGERY | Facility: CLINIC | Age: 40
End: 2020-10-19

## 2020-10-19 ENCOUNTER — ANCILLARY PROCEDURE (OUTPATIENT)
Dept: MAMMOGRAPHY | Facility: CLINIC | Age: 40
End: 2020-10-19
Attending: FAMILY MEDICINE
Payer: COMMERCIAL

## 2020-10-19 DIAGNOSIS — N64.52 BREAST DISCHARGE: ICD-10-CM

## 2020-10-19 PROCEDURE — G0279 TOMOSYNTHESIS, MAMMO: HCPCS | Performed by: RADIOLOGY

## 2020-10-19 PROCEDURE — 77066 DX MAMMO INCL CAD BI: CPT | Performed by: RADIOLOGY

## 2020-10-22 ENCOUNTER — VIRTUAL VISIT (OUTPATIENT)
Dept: FAMILY MEDICINE | Facility: CLINIC | Age: 40
End: 2020-10-22
Payer: COMMERCIAL

## 2020-10-22 DIAGNOSIS — E06.3 HASHIMOTO'S THYROIDITIS: ICD-10-CM

## 2020-10-22 DIAGNOSIS — K82.8 NON-FUNCTIONING GALLBLADDER: ICD-10-CM

## 2020-10-22 DIAGNOSIS — F43.22 ADJUSTMENT DISORDER WITH ANXIOUS MOOD: Primary | ICD-10-CM

## 2020-10-22 PROCEDURE — 99213 OFFICE O/P EST LOW 20 MIN: CPT | Mod: TEL | Performed by: FAMILY MEDICINE

## 2020-10-22 RX ORDER — LEVOTHYROXINE SODIUM 75 UG/1
75 TABLET ORAL DAILY
Qty: 90 TABLET | Refills: 3 | Status: SHIPPED | OUTPATIENT
Start: 2020-10-22 | End: 2021-07-07

## 2020-10-22 SDOH — SOCIAL STABILITY: SOCIAL INSECURITY: WITHIN THE LAST YEAR, HAVE YOU BEEN HUMILIATED OR EMOTIONALLY ABUSED IN OTHER WAYS BY YOUR PARTNER OR EX-PARTNER?: NO

## 2020-10-22 SDOH — SOCIAL STABILITY: SOCIAL NETWORK: HOW OFTEN DO YOU GET TOGETHER WITH FRIENDS OR RELATIVES?: NOT ASKED

## 2020-10-22 SDOH — SOCIAL STABILITY: SOCIAL NETWORK: ARE YOU MARRIED, WIDOWED, DIVORCED, SEPARATED, NEVER MARRIED, OR LIVING WITH A PARTNER?: MARRIED

## 2020-10-22 SDOH — ECONOMIC STABILITY: FOOD INSECURITY: WITHIN THE PAST 12 MONTHS, THE FOOD YOU BOUGHT JUST DIDN'T LAST AND YOU DIDN'T HAVE MONEY TO GET MORE.: NEVER TRUE

## 2020-10-22 SDOH — SOCIAL STABILITY: SOCIAL INSECURITY
WITHIN THE LAST YEAR, HAVE YOU BEEN KICKED, HIT, SLAPPED, OR OTHERWISE PHYSICALLY HURT BY YOUR PARTNER OR EX-PARTNER?: NO

## 2020-10-22 SDOH — ECONOMIC STABILITY: TRANSPORTATION INSECURITY
IN THE PAST 12 MONTHS, HAS LACK OF TRANSPORTATION KEPT YOU FROM MEETINGS, WORK, OR FROM GETTING THINGS NEEDED FOR DAILY LIVING?: NO

## 2020-10-22 SDOH — SOCIAL STABILITY: SOCIAL NETWORK: IN A TYPICAL WEEK, HOW MANY TIMES DO YOU TALK ON THE PHONE WITH FAMILY, FRIENDS, OR NEIGHBORS?: NOT ASKED

## 2020-10-22 SDOH — ECONOMIC STABILITY: FOOD INSECURITY: WITHIN THE PAST 12 MONTHS, YOU WORRIED THAT YOUR FOOD WOULD RUN OUT BEFORE YOU GOT MONEY TO BUY MORE.: NEVER TRUE

## 2020-10-22 SDOH — SOCIAL STABILITY: SOCIAL NETWORK
DO YOU BELONG TO ANY CLUBS OR ORGANIZATIONS SUCH AS CHURCH GROUPS UNIONS, FRATERNAL OR ATHLETIC GROUPS, OR SCHOOL GROUPS?: NOT ASKED

## 2020-10-22 SDOH — SOCIAL STABILITY: SOCIAL INSECURITY: WITHIN THE LAST YEAR, HAVE YOU BEEN AFRAID OF YOUR PARTNER OR EX-PARTNER?: NO

## 2020-10-22 SDOH — ECONOMIC STABILITY: TRANSPORTATION INSECURITY
IN THE PAST 12 MONTHS, HAS THE LACK OF TRANSPORTATION KEPT YOU FROM MEDICAL APPOINTMENTS OR FROM GETTING MEDICATIONS?: NO

## 2020-10-22 SDOH — ECONOMIC STABILITY: INCOME INSECURITY: HOW HARD IS IT FOR YOU TO PAY FOR THE VERY BASICS LIKE FOOD, HOUSING, MEDICAL CARE, AND HEATING?: NOT HARD AT ALL

## 2020-10-22 SDOH — SOCIAL STABILITY: SOCIAL NETWORK: HOW OFTEN DO YOU ATTEND CHURCH OR RELIGIOUS SERVICES?: NOT ASKED

## 2020-10-22 SDOH — SOCIAL STABILITY: SOCIAL INSECURITY
WITHIN THE LAST YEAR, HAVE TO BEEN RAPED OR FORCED TO HAVE ANY KIND OF SEXUAL ACTIVITY BY YOUR PARTNER OR EX-PARTNER?: NO

## 2020-10-22 SDOH — SOCIAL STABILITY: SOCIAL NETWORK: HOW OFTEN DO YOU ATTENT MEETINGS OF THE CLUB OR ORGANIZATION YOU BELONG TO?: NOT ASKED

## 2020-10-22 NOTE — PROGRESS NOTES
"Karen Beverly is a 40 year old female who is being evaluated via a billable telephone visit.      The patient has been notified of following:     \"This telephone visit will be conducted via a call between you and your physician/provider. We have found that certain health care needs can be provided without the need for a physical exam.  This service lets us provide the care you need with a short phone conversation.  If a prescription is necessary we can send it directly to your pharmacy.  If lab work is needed we can place an order for that and you can then stop by our lab to have the test done at a later time.    Telephone visits are billed at different rates depending on your insurance coverage. During this emergency period, for some insurers they may be billed the same as an in-person visit.  Please reach out to your insurance provider with any questions.    If during the course of the call the physician/provider feels a telephone visit is not appropriate, you will not be charged for this service.\"    Patient has given verbal consent for Telephone visit?  Yes    What phone number would you like to be contacted at? [phone] (home)     How would you like to obtain your AVS? Velia    Subjective     Karen Beverly is a 40 year old female who presents via phone visit today for the following health issues:    HPI        History of Present Illness:   Karen Beverly is a 40 year old female with a history of endometriosis, Hashimoto's thyroiditis, and non-functioning gallbladder who presents for follow-up after physical where we discussed breast discharge, recent mammogram reviewed she also has multiple medical concerns    Abdominal pain secondary to Gallbladder  She has intermittent abdominal pain and delayed further management of her nonfunctioning gallbladder due to pandemic.  She was scheduled to meet with surgeon but the visit was canceled and she has had difficulty rescheduling due to childcare concerns " and is wondering when she will be able to meet with a surgeon.    Karen had a NM Hepatobiliary scan on 18 for further evaluation of ongoing abdominal pain (see below; see my note from 2018 for full summary). Liver hemangioma has been stable throughout imaging.  Her grandmother  after complications from gallbladder surgery and had GI cancer.    Examination:  NM HEPATOBILIARY SCAN WITH GB EF 2019                                                Impression:  1. No evidence of cystic duct obstruction.  2. Decreased gallbladder ejection fraction suggesting functional gall  bladder disorder.    Mammogram was normal and radiologist indicated that sometimes breast discharge occurs with thyroid conditions therefore most likely physiological.  She updated me of family history however is wondering if it is okay to delay genetic counseling referral.  I discussed we will continue with an annual mammogram okay to delay genetic counseling referral at this time.  She has several dermatological concerns including rash under her arm but improved with topical clotrimazole.  She notes occasionally she will have a rash on her face that seems to be timed around her menses and is wondering if it is related to a fungal infection or other concern.  I discussed it would be difficult to determine this without visualizing the rash and she will try to send a picture.  Otherwise she will contact me if she would like dermatology referral.    Possible depression anxiety  She notes symptoms of anxiety and depression times when she does not feel like she wants to interact with her family although she was able to do this all summer but has fluctuating mood.  She indicates when she has these ups and downs she is not certain if it is related to hormones or other concern such as worry about her chronic conditions.  She feels reassured related to most of her results being normal but still would like to learn more about medications  that might help her mood as she often worries I recommend she consider Lexapro or Escitalopram she would like to look into this and let me know if she is interested.    She also has a history of thyroid inflammation Hashimoto's and I discussed often people with this thyroid condition will have associated anxiety and depression.        Review of Systems   Constitutional, HEENT, cardiovascular, pulmonary, gi and gu systems are negative, except as otherwise noted.       Objective          Vitals:  No vitals were obtained today due to virtual visit.    healthy, alert, no distress and cooperative  PSYCH: Alert and oriented times 3; coherent speech, normal   rate and volume, able to articulate logical thoughts, able   to abstract reason, no tangential thoughts, no hallucinations   or delusions  Her affect is pleasant and anxious  RESP: No cough, no audible wheezing, able to talk in full sentences  Remainder of exam unable to be completed due to telephone visits        Assessment/Plan:  She presents for follow-up of physical and recent test results as noted above.   I will asked coordinators to assist with coordination follow-up of surgical visit which was canceled.  She will try to send me a picture of her facial rash to determine next management and also indicated she would try little bit of the clotrimazole cream and to determine if this would treat her rash otherwise will let me know if she would like a dermatology referral.  For her mood she will look into Escitalopram and determine if she would like to start this particular medication I will also put information after visit summary.  We will plan on doing annual mammograms at this time we will hold off on genetic counseling but she will contact me if she is interested in the future.    Adjustment disorder with anxious mood    Hashimoto's thyroiditis  -     levothyroxine (SYNTHROID/LEVOTHROID) 75 MCG tablet; Take 1 tablet (75 mcg) by mouth daily    Non-functioning  gallbladder              Lars Burt MD  [facility]    Phone call duration: 10:34-10:55  21 minutes

## 2020-10-22 NOTE — PATIENT INSTRUCTIONS
Patient Education     Patient Education    Escitalopram Oral solution    Escitalopram Oral tablet  Escitalopram Oral tablet  What is this medicine?  ESCITALOPRAM (es sye TITA oh pram) is used to treat depression and certain types of anxiety.  This medicine may be used for other purposes; ask your health care provider or pharmacist if you have questions.  What should I tell my health care provider before I take this medicine?  They need to know if you have any of these conditions:    bipolar disorder or a family history of bipolar disorder    diabetes    glaucoma    heart disease    kidney or liver disease    receiving electroconvulsive therapy    seizures (convulsions)    suicidal thoughts, plans, or attempt by you or a family member    an unusual or allergic reaction to escitalopram, the related drug citalopram, other medicines, foods, dyes, or preservatives    pregnant or trying to become pregnant    breast-feeding  How should I use this medicine?  Take this medicine by mouth with a glass of water. Follow the directions on the prescription label. You can take it with or without food. If it upsets your stomach, take it with food. Take your medicine at regular intervals. Do not take it more often than directed. Do not stop taking this medicine suddenly except upon the advice of your doctor. Stopping this medicine too quickly may cause serious side effects or your condition may worsen.  A special MedGuide will be given to you by the pharmacist with each prescription and refill. Be sure to read this information carefully each time.  Talk to your pediatrician regarding the use of this medicine in children. Special care may be needed.  Overdosage: If you think you have taken too much of this medicine contact a poison control center or emergency room at once.  NOTE: This medicine is only for you. Do not share this medicine with others.  What if I miss a dose?  If you miss a dose, take it as soon as you can. If it is  almost time for your next dose, take only that dose. Do not take double or extra doses.  What may interact with this medicine?  Do not take this medicine with any of the following medications:    certain medicines for fungal infections like fluconazole, itraconazole, ketoconazole, posaconazole, voriconazole    cisapride    citalopram    dofetilide    dronedarone    linezolid    MAOIs like Carbex, Eldepryl, Marplan, Nardil, and Parnate    methylene blue (injected into a vein)    pimozide    thioridazine    ziprasidone  This medicine may also interact with the following medications:    alcohol    aspirin and aspirin-like medicines    carbamazepine    certain medicines for depression, anxiety, or psychotic disturbances    certain medicines for migraine headache like almotriptan, eletriptan, frovatriptan, naratriptan, rizatriptan, sumatriptan, zolmitriptan    certain medicines for sleep    certain medicines that treat or prevent blood clots like warfarin, enoxaparin, dalteparin    cimetidine    diuretics    fentanyl    furazolidone    isoniazid    lithium    metoprolol    NSAIDs, medicines for pain and inflammation, like ibuprofen or naproxen    other medicines that prolong the QT interval (cause an abnormal heart rhythm)    procarbazine    rasagiline    supplements like Jazmin's wort, kava kava, valerian    tramadol    tryptophan  This list may not describe all possible interactions. Give your health care provider a list of all the medicines, herbs, non-prescription drugs, or dietary supplements you use. Also tell them if you smoke, drink alcohol, or use illegal drugs. Some items may interact with your medicine.  What should I watch for while using this medicine?  Tell your doctor if your symptoms do not get better or if they get worse. Visit your doctor or health care professional for regular checks on your progress. Because it may take several weeks to see the full effects of this medicine, it is important to  continue your treatment as prescribed by your doctor.  Patients and their families should watch out for new or worsening thoughts of suicide or depression. Also watch out for sudden changes in feelings such as feeling anxious, agitated, panicky, irritable, hostile, aggressive, impulsive, severely restless, overly excited and hyperactive, or not being able to sleep. If this happens, especially at the beginning of treatment or after a change in dose, call your health care professional.  You may get drowsy or dizzy. Do not drive, use machinery, or do anything that needs mental alertness until you know how this medicine affects you. Do not stand or sit up quickly, especially if you are an older patient. This reduces the risk of dizzy or fainting spells. Alcohol may interfere with the effect of this medicine. Avoid alcoholic drinks.  Your mouth may get dry. Chewing sugarless gum or sucking hard candy, and drinking plenty of water may help. Contact your doctor if the problem does not go away or is severe.  What side effects may I notice from receiving this medicine?  Side effects that you should report to your doctor or health care professional as soon as possible:    allergic reactions like skin rash, itching or hives, swelling of the face, lips, or tongue    confusion    feeling faint or lightheaded, falls    fast talking and excited feelings or actions that are out of control    hallucination, loss of contact with reality    seizures    suicidal thoughts or other mood changes    unusual bleeding or bruising  Side effects that usually do not require medical attention (report to your doctor or health care professional if they continue or are bothersome):    blurred vision    changes in appetite    change in sex drive or performance    headache    increased sweating    nausea  This list may not describe all possible side effects. Call your doctor for medical advice about side effects. You may report side effects to FDA at  1-800-FDA-1088.  Where should I keep my medicine?  Keep out of reach of children.  Store at room temperature between 15 and 30 degrees C (59 and 86 degrees F). Throw away any unused medicine after the expiration date.  NOTE:This sheet is a summary. It may not cover all possible information. If you have questions about this medicine, talk to your doctor, pharmacist, or health care provider. Copyright  2016 [employer]           Patient Education     Adjustment Disorder  Life changes--work, family, parents, children--each can cause a great deal of stress in life. An adjustment disorder means you have trouble dealing with this change and stress. This problem can have serious results. You may feel helpless, depressed, make bad decisions, or even feel like you want to hurt yourself.  Adjustment disorder can cause anxiety or depression. It is triggered by daily stresses such as:    Death of a loved one    Divorce    Marriage    General life changes such as changing or leaving a job    Moving    Illness or other health issue for you or a family member    Sex    Money     Symptoms may include:    Sadness or crying    Anxiety    Insomnia    Poor concentration    Trouble doing simple things    New problems at work or with family or friends    Loss of self-esteem    Sense of hopelessness    Feeling trapped or cut off from others  With this condition, it is common to feel sad, guilty, hopeless, and restless. These feelings may continue for weeks or months. It can be helpful to identify what is causing the additional stress and take steps to get extra support. If new stressful events do not happen, it is likely that you will gradually start feeling better.  Home care    If you have been given a prescription for medicine, take it as directed.    It helps to talk about your feelings and thoughts with family or friends who understand and support you.  Follow-up care  Follow up with your healthcare provider, or therapist as  advised. Let them know if this condition does not improve or gets worse.  When to seek medical advice  Call your healthcare provider right away if any of these happen:    Worsening depression or anxiety    Feeling out of control    Thoughts of harming yourself or another    Being unable to care for yourself  Date Last Reviewed: 10/1/2017    0191-2084 [employer]. [address] [place] [address], [place] [address]. All rights reserved. This information is not intended as a substitute for professional medical care. Always follow your healthcare professional's instructions.

## 2020-11-12 ENCOUNTER — APPOINTMENT (OUTPATIENT)
Dept: URBAN - METROPOLITAN AREA CLINIC 252 | Age: 40
Setting detail: DERMATOLOGY
End: 2020-11-16

## 2020-11-12 VITALS — RESPIRATION RATE: 16 BRPM | WEIGHT: 145 LBS | HEIGHT: 64 IN

## 2020-11-12 DIAGNOSIS — L71.0 PERIORAL DERMATITIS: ICD-10-CM

## 2020-11-12 DIAGNOSIS — L70.0 ACNE VULGARIS: ICD-10-CM

## 2020-11-12 PROCEDURE — 99202 OFFICE O/P NEW SF 15 MIN: CPT

## 2020-11-12 PROCEDURE — OTHER PRESCRIPTION: OTHER

## 2020-11-12 PROCEDURE — OTHER COUNSELING: OTHER

## 2020-11-12 RX ORDER — MINOCYCLINE HYDROCHLORIDE 100 MG/1
100MG CAPSULE ORAL BID
Qty: 60 | Refills: 1 | Status: ERX | COMMUNITY
Start: 2020-11-12

## 2020-11-12 RX ORDER — METRONIDAZOLE 7.5 MG/G
0.75% GEL TOPICAL BID
Qty: 1 | Refills: 1 | Status: ERX | COMMUNITY
Start: 2020-11-12

## 2020-11-12 ASSESSMENT — LOCATION SIMPLE DESCRIPTION DERM
LOCATION SIMPLE: RIGHT LIP
LOCATION SIMPLE: LEFT UPPER BACK
LOCATION SIMPLE: LEFT CHEEK
LOCATION SIMPLE: CHEST
LOCATION SIMPLE: CHIN
LOCATION SIMPLE: RIGHT CHEEK

## 2020-11-12 ASSESSMENT — LOCATION ZONE DERM
LOCATION ZONE: TRUNK
LOCATION ZONE: FACE
LOCATION ZONE: LIP

## 2020-11-12 ASSESSMENT — LOCATION DETAILED DESCRIPTION DERM
LOCATION DETAILED: LEFT SUPERIOR UPPER BACK
LOCATION DETAILED: LEFT SUPERIOR CENTRAL BUCCAL CHEEK
LOCATION DETAILED: RIGHT UPPER CUTANEOUS LIP
LOCATION DETAILED: RIGHT CHIN
LOCATION DETAILED: UPPER STERNUM
LOCATION DETAILED: LEFT INFERIOR CENTRAL MALAR CHEEK
LOCATION DETAILED: RIGHT CENTRAL BUCCAL CHEEK

## 2020-11-12 NOTE — PROCEDURE: COUNSELING
Dapsone Pregnancy And Lactation Text: This medication is Pregnancy Category C and is not considered safe during pregnancy or breast feeding.
Erythromycin Counseling:  I discussed with the patient the risks of erythromycin including but not limited to GI upset, allergic reaction, drug rash, diarrhea, increase in liver enzymes, and yeast infections.
Isotretinoin Pregnancy And Lactation Text: This medication is Pregnancy Category X and is considered extremely dangerous during pregnancy. It is unknown if it is excreted in breast milk.
Birth Control Pills Pregnancy And Lactation Text: This medication should be avoided if pregnant and for the first 30 days post-partum.
Benzoyl Peroxide Pregnancy And Lactation Text: This medication is Pregnancy Category C. It is unknown if benzoyl peroxide is excreted in breast milk.
Topical Sulfur Applications Pregnancy And Lactation Text: This medication is Pregnancy Category C and has an unknown safety profile during pregnancy. It is unknown if this topical medication is excreted in breast milk.
High Dose Vitamin A Pregnancy And Lactation Text: High dose vitamin A therapy is contraindicated during pregnancy and breast feeding.
Patient Specific Counseling (Will Not Stick From Patient To Patient): Avoid hydrocortisone as it can worsen the condition
Topical Retinoid Pregnancy And Lactation Text: This medication is Pregnancy Category C. It is unknown if this medication is excreted in breast milk.
Birth Control Pills Counseling: Birth Control Pill Counseling: I discussed with the patient the potential side effects of OCPs including but not limited to increased risk of stroke, heart attack, thrombophlebitis, deep venous thrombosis, hepatic adenomas, breast changes, GI upset, headaches, and depression.  The patient verbalized understanding of the proper use and possible adverse effects of OCPs. All of the patient's questions and concerns were addressed.
Bactrim Pregnancy And Lactation Text: This medication is Pregnancy Category D and is known to cause fetal risk.  It is also excreted in breast milk.
Detail Level: Zone
Erythromycin Pregnancy And Lactation Text: This medication is Pregnancy Category B and is considered safe during pregnancy. It is also excreted in breast milk.
Use Enhanced Medication Counseling?: No
High Dose Vitamin A Counseling: Side effects reviewed, pt to contact office should one occur.
Spironolactone Counseling: Patient advised regarding risks of diarrhea, abdominal pain, hyperkalemia, birth defects (for female patients), liver toxicity and renal toxicity. The patient may need blood work to monitor liver and kidney function and potassium levels while on therapy. The patient verbalized understanding of the proper use and possible adverse effects of spironolactone.  All of the patient's questions and concerns were addressed.
Minocycline Counseling: Patient advised regarding possible photosensitivity and discoloration of the teeth, skin, lips, tongue and gums.  Patient instructed to avoid sunlight, if possible.  When exposed to sunlight, patients should wear protective clothing, sunglasses, and sunscreen.  The patient was instructed to call the office immediately if the following severe adverse effects occur:  hearing changes, easy bruising/bleeding, severe headache, or vision changes.  The patient verbalized understanding of the proper use and possible adverse effects of minocycline.  All of the patient's questions and concerns were addressed.
Detail Level: Simple
Spironolactone Pregnancy And Lactation Text: This medication can cause feminization of the male fetus and should be avoided during pregnancy. The active metabolite is also found in breast milk.
Minocycline Pregnancy And Lactation Text: This medication is Pregnancy Category D and not consider safe during pregnancy. It is also excreted in breast milk.
Bactrim Counseling:  I discussed with the patient the risks of sulfa antibiotics including but not limited to GI upset, allergic reaction, drug rash, diarrhea, dizziness, photosensitivity, and yeast infections.  Rarely, more serious reactions can occur including but not limited to aplastic anemia, agranulocytosis, methemoglobinemia, blood dyscrasias, liver or kidney failure, lung infiltrates or desquamative/blistering drug rashes.
Azithromycin Counseling:  I discussed with the patient the risks of azithromycin including but not limited to GI upset, allergic reaction, drug rash, diarrhea, and yeast infections.
Doxycycline Counseling:  Patient counseled regarding possible photosensitivity and increased risk for sunburn.  Patient instructed to avoid sunlight, if possible.  When exposed to sunlight, patients should wear protective clothing, sunglasses, and sunscreen.  The patient was instructed to call the office immediately if the following severe adverse effects occur:  hearing changes, easy bruising/bleeding, severe headache, or vision changes.  The patient verbalized understanding of the proper use and possible adverse effects of doxycycline.  All of the patient's questions and concerns were addressed.
Patient Specific Counseling (Will Not Stick From Patient To Patient): -Would prefer to wait on prescribing anything for her acne right now until the perioral dermatitis has resolved. \\n-Will consider spironolactone in future.\\n-Discussed dietary changes that can improve acne such a avoiding dairy
Tetracycline Counseling: Patient counseled regarding possible photosensitivity and increased risk for sunburn.  Patient instructed to avoid sunlight, if possible.  When exposed to sunlight, patients should wear protective clothing, sunglasses, and sunscreen.  The patient was instructed to call the office immediately if the following severe adverse effects occur:  hearing changes, easy bruising/bleeding, severe headache, or vision changes.  The patient verbalized understanding of the proper use and possible adverse effects of tetracycline.  All of the patient's questions and concerns were addressed. Patient understands to avoid pregnancy while on therapy due to potential birth defects.
Tazorac Counseling:  Patient advised that medication is irritating and drying.  Patient may need to apply sparingly and wash off after an hour before eventually leaving it on overnight.  The patient verbalized understanding of the proper use and possible adverse effects of tazorac.  All of the patient's questions and concerns were addressed.
Topical Retinoid counseling:  Patient advised to apply a pea-sized amount only at bedtime and wait 30 minutes after washing their face before applying.  If too drying, patient may add a non-comedogenic moisturizer. The patient verbalized understanding of the proper use and possible adverse effects of retinoids.  All of the patient's questions and concerns were addressed.
Dapsone Counseling: I discussed with the patient the risks of dapsone including but not limited to hemolytic anemia, agranulocytosis, rashes, methemoglobinemia, kidney failure, peripheral neuropathy, headaches, GI upset, and liver toxicity.  Patients who start dapsone require monitoring including baseline LFTs and weekly CBCs for the first month, then every month thereafter.  The patient verbalized understanding of the proper use and possible adverse effects of dapsone.  All of the patient's questions and concerns were addressed.
Sarecycline Counseling: Patient advised regarding possible photosensitivity and discoloration of the teeth, skin, lips, tongue and gums.  Patient instructed to avoid sunlight, if possible.  When exposed to sunlight, patients should wear protective clothing, sunglasses, and sunscreen.  The patient was instructed to call the office immediately if the following severe adverse effects occur:  hearing changes, easy bruising/bleeding, severe headache, or vision changes.  The patient verbalized understanding of the proper use and possible adverse effects of sarecycline.  All of the patient's questions and concerns were addressed.
Azithromycin Pregnancy And Lactation Text: This medication is considered safe during pregnancy and is also secreted in breast milk.
Topical Clindamycin Pregnancy And Lactation Text: This medication is Pregnancy Category B and is considered safe during pregnancy. It is unknown if it is excreted in breast milk.
Topical Clindamycin Counseling: Patient counseled that this medication may cause skin irritation or allergic reactions.  In the event of skin irritation, the patient was advised to reduce the amount of the drug applied or use it less frequently.   The patient verbalized understanding of the proper use and possible adverse effects of clindamycin.  All of the patient's questions and concerns were addressed.
Benzoyl Peroxide Counseling: Patient counseled that medicine may cause skin irritation and bleach clothing.  In the event of skin irritation, the patient was advised to reduce the amount of the drug applied or use it less frequently.   The patient verbalized understanding of the proper use and possible adverse effects of benzoyl peroxide.  All of the patient's questions and concerns were addressed.
Tazorac Pregnancy And Lactation Text: This medication is not safe during pregnancy. It is unknown if this medication is excreted in breast milk.
Doxycycline Pregnancy And Lactation Text: This medication is Pregnancy Category D and not consider safe during pregnancy. It is also excreted in breast milk but is considered safe for shorter treatment courses.
Isotretinoin Counseling: Patient should get monthly blood tests, not donate blood, not drive at night if vision affected, not share medication, and not undergo elective surgery for 6 months after tx completed. Side effects reviewed, pt to contact office should one occur.
Topical Sulfur Applications Counseling: Topical Sulfur Counseling: Patient counseled that this medication may cause skin irritation or allergic reactions.  In the event of skin irritation, the patient was advised to reduce the amount of the drug applied or use it less frequently.   The patient verbalized understanding of the proper use and possible adverse effects of topical sulfur application.  All of the patient's questions and concerns were addressed.

## 2021-01-07 ENCOUNTER — APPOINTMENT (OUTPATIENT)
Dept: URBAN - METROPOLITAN AREA CLINIC 252 | Age: 41
Setting detail: DERMATOLOGY
End: 2021-01-10

## 2021-01-07 VITALS — HEIGHT: 65 IN | RESPIRATION RATE: 16 BRPM | WEIGHT: 145 LBS

## 2021-01-07 DIAGNOSIS — L70.0 ACNE VULGARIS: ICD-10-CM

## 2021-01-07 DIAGNOSIS — L71.0 PERIORAL DERMATITIS: ICD-10-CM

## 2021-01-07 PROCEDURE — OTHER PRESCRIPTION: OTHER

## 2021-01-07 PROCEDURE — OTHER COUNSELING: OTHER

## 2021-01-07 PROCEDURE — 99213 OFFICE O/P EST LOW 20 MIN: CPT

## 2021-01-07 RX ORDER — SPIRONOLACTONE 50 MG/1
50MG TABLET, FILM COATED ORAL BID
Qty: 60 | Refills: 0 | Status: ERX | COMMUNITY
Start: 2021-01-07

## 2021-01-07 ASSESSMENT — LOCATION DETAILED DESCRIPTION DERM
LOCATION DETAILED: LEFT INFERIOR MEDIAL MALAR CHEEK
LOCATION DETAILED: LEFT MEDIAL FOREHEAD

## 2021-01-07 ASSESSMENT — LOCATION SIMPLE DESCRIPTION DERM
LOCATION SIMPLE: LEFT FOREHEAD
LOCATION SIMPLE: LEFT CHEEK

## 2021-01-07 ASSESSMENT — LOCATION ZONE DERM: LOCATION ZONE: FACE

## 2021-01-07 NOTE — PROCEDURE: COUNSELING
High Dose Vitamin A Pregnancy And Lactation Text: High dose vitamin A therapy is contraindicated during pregnancy and breast feeding.
Minocycline Pregnancy And Lactation Text: This medication is Pregnancy Category D and not consider safe during pregnancy. It is also excreted in breast milk.
Spironolactone Counseling: Patient advised regarding risks of diarrhea, abdominal pain, hyperkalemia, birth defects (for female patients), liver toxicity and renal toxicity. The patient may need blood work to monitor liver and kidney function and potassium levels while on therapy. The patient verbalized understanding of the proper use and possible adverse effects of spironolactone.  All of the patient's questions and concerns were addressed.
Spironolactone Pregnancy And Lactation Text: This medication can cause feminization of the male fetus and should be avoided during pregnancy. The active metabolite is also found in breast milk.
Topical Sulfur Applications Counseling: Topical Sulfur Counseling: Patient counseled that this medication may cause skin irritation or allergic reactions.  In the event of skin irritation, the patient was advised to reduce the amount of the drug applied or use it less frequently.   The patient verbalized understanding of the proper use and possible adverse effects of topical sulfur application.  All of the patient's questions and concerns were addressed.
Isotretinoin Counseling: Patient should get monthly blood tests, not donate blood, not drive at night if vision affected, not share medication, and not undergo elective surgery for 6 months after tx completed. Side effects reviewed, pt to contact office should one occur.
Sarecycline Counseling: Patient advised regarding possible photosensitivity and discoloration of the teeth, skin, lips, tongue and gums.  Patient instructed to avoid sunlight, if possible.  When exposed to sunlight, patients should wear protective clothing, sunglasses, and sunscreen.  The patient was instructed to call the office immediately if the following severe adverse effects occur:  hearing changes, easy bruising/bleeding, severe headache, or vision changes.  The patient verbalized understanding of the proper use and possible adverse effects of sarecycline.  All of the patient's questions and concerns were addressed.
Bactrim Counseling:  I discussed with the patient the risks of sulfa antibiotics including but not limited to GI upset, allergic reaction, drug rash, diarrhea, dizziness, photosensitivity, and yeast infections.  Rarely, more serious reactions can occur including but not limited to aplastic anemia, agranulocytosis, methemoglobinemia, blood dyscrasias, liver or kidney failure, lung infiltrates or desquamative/blistering drug rashes.
Patient Specific Counseling (Will Not Stick From Patient To Patient): Recommended to start taking spironolactone at different times than the levothyroxine.
Erythromycin Pregnancy And Lactation Text: This medication is Pregnancy Category B and is considered safe during pregnancy. It is also excreted in breast milk.
Detail Level: Zone
Topical Sulfur Applications Pregnancy And Lactation Text: This medication is Pregnancy Category C and has an unknown safety profile during pregnancy. It is unknown if this topical medication is excreted in breast milk.
Doxycycline Counseling:  Patient counseled regarding possible photosensitivity and increased risk for sunburn.  Patient instructed to avoid sunlight, if possible.  When exposed to sunlight, patients should wear protective clothing, sunglasses, and sunscreen.  The patient was instructed to call the office immediately if the following severe adverse effects occur:  hearing changes, easy bruising/bleeding, severe headache, or vision changes.  The patient verbalized understanding of the proper use and possible adverse effects of doxycycline.  All of the patient's questions and concerns were addressed.
Erythromycin Counseling:  I discussed with the patient the risks of erythromycin including but not limited to GI upset, allergic reaction, drug rash, diarrhea, increase in liver enzymes, and yeast infections.
Tazorac Counseling:  Patient advised that medication is irritating and drying.  Patient may need to apply sparingly and wash off after an hour before eventually leaving it on overnight.  The patient verbalized understanding of the proper use and possible adverse effects of tazorac.  All of the patient's questions and concerns were addressed.
Birth Control Pills Counseling: Birth Control Pill Counseling: I discussed with the patient the potential side effects of OCPs including but not limited to increased risk of stroke, heart attack, thrombophlebitis, deep venous thrombosis, hepatic adenomas, breast changes, GI upset, headaches, and depression.  The patient verbalized understanding of the proper use and possible adverse effects of OCPs. All of the patient's questions and concerns were addressed.
Detail Level: Simple
High Dose Vitamin A Counseling: Side effects reviewed, pt to contact office should one occur.
Benzoyl Peroxide Pregnancy And Lactation Text: This medication is Pregnancy Category C. It is unknown if benzoyl peroxide is excreted in breast milk.
Tetracycline Counseling: Patient counseled regarding possible photosensitivity and increased risk for sunburn.  Patient instructed to avoid sunlight, if possible.  When exposed to sunlight, patients should wear protective clothing, sunglasses, and sunscreen.  The patient was instructed to call the office immediately if the following severe adverse effects occur:  hearing changes, easy bruising/bleeding, severe headache, or vision changes.  The patient verbalized understanding of the proper use and possible adverse effects of tetracycline.  All of the patient's questions and concerns were addressed. Patient understands to avoid pregnancy while on therapy due to potential birth defects.
Include Pregnancy/Lactation Warning?: No
Benzoyl Peroxide Counseling: Patient counseled that medicine may cause skin irritation and bleach clothing.  In the event of skin irritation, the patient was advised to reduce the amount of the drug applied or use it less frequently.   The patient verbalized understanding of the proper use and possible adverse effects of benzoyl peroxide.  All of the patient's questions and concerns were addressed.
Birth Control Pills Pregnancy And Lactation Text: This medication should be avoided if pregnant and for the first 30 days post-partum.
Topical Retinoid counseling:  Patient advised to apply a pea-sized amount only at bedtime and wait 30 minutes after washing their face before applying.  If too drying, patient may add a non-comedogenic moisturizer. The patient verbalized understanding of the proper use and possible adverse effects of retinoids.  All of the patient's questions and concerns were addressed.
Dapsone Counseling: I discussed with the patient the risks of dapsone including but not limited to hemolytic anemia, agranulocytosis, rashes, methemoglobinemia, kidney failure, peripheral neuropathy, headaches, GI upset, and liver toxicity.  Patients who start dapsone require monitoring including baseline LFTs and weekly CBCs for the first month, then every month thereafter.  The patient verbalized understanding of the proper use and possible adverse effects of dapsone.  All of the patient's questions and concerns were addressed.
Isotretinoin Pregnancy And Lactation Text: This medication is Pregnancy Category X and is considered extremely dangerous during pregnancy. It is unknown if it is excreted in breast milk.
Minocycline Counseling: Patient advised regarding possible photosensitivity and discoloration of the teeth, skin, lips, tongue and gums.  Patient instructed to avoid sunlight, if possible.  When exposed to sunlight, patients should wear protective clothing, sunglasses, and sunscreen.  The patient was instructed to call the office immediately if the following severe adverse effects occur:  hearing changes, easy bruising/bleeding, severe headache, or vision changes.  The patient verbalized understanding of the proper use and possible adverse effects of minocycline.  All of the patient's questions and concerns were addressed.
Topical Clindamycin Counseling: Patient counseled that this medication may cause skin irritation or allergic reactions.  In the event of skin irritation, the patient was advised to reduce the amount of the drug applied or use it less frequently.   The patient verbalized understanding of the proper use and possible adverse effects of clindamycin.  All of the patient's questions and concerns were addressed.
Tazorac Pregnancy And Lactation Text: This medication is not safe during pregnancy. It is unknown if this medication is excreted in breast milk.
Doxycycline Pregnancy And Lactation Text: This medication is Pregnancy Category D and not consider safe during pregnancy. It is also excreted in breast milk but is considered safe for shorter treatment courses.
Topical Clindamycin Pregnancy And Lactation Text: This medication is Pregnancy Category B and is considered safe during pregnancy. It is unknown if it is excreted in breast milk.
Patient Specific Counseling (Will Not Stick From Patient To Patient): *** recommended to use metronidazole cream everyday and if flare up occurs to use twice a day.
Dapsone Pregnancy And Lactation Text: This medication is Pregnancy Category C and is not considered safe during pregnancy or breast feeding.
Azithromycin Pregnancy And Lactation Text: This medication is considered safe during pregnancy and is also secreted in breast milk.
Azithromycin Counseling:  I discussed with the patient the risks of azithromycin including but not limited to GI upset, allergic reaction, drug rash, diarrhea, and yeast infections.
Bactrim Pregnancy And Lactation Text: This medication is Pregnancy Category D and is known to cause fetal risk.  It is also excreted in breast milk.
Topical Retinoid Pregnancy And Lactation Text: This medication is Pregnancy Category C. It is unknown if this medication is excreted in breast milk.

## 2021-01-08 NOTE — TELEPHONE ENCOUNTER
REFERRAL INFORMATION:    Referring Provider:  Dr. Lars Burt     Referring Clinic:  [facility]     Reason for Visit/Diagnosis: Gallbladder        FUTURE VISIT INFORMATION:    Appointment Date: 1/18/2021    Appointment Time: 9:30 AM      NOTES RECORD STATUS  DETAILS   OFFICE NOTE from Referring Provider Internal 10/22/2020, 9/28/2020, 2/18/19 Office visit with Dr. Burt    OFFICE NOTE from Other Specialists N/A    HOSPITAL DISCHARGE SUMMARY/ ED VISITS  N/A    OPERATIVE REPORT N/A    ENDOSCOPY (EGD)  N/A    PERTINENT LABS Internal    PATHOLOGY REPORTS (RELATED) N/A    IMAGING (CT, MRI, US, XR)  Internal NM Hepatobiliary: 12/20/18  US Abdomen: 3/19/18, 12/5/17

## 2021-01-15 ENCOUNTER — PATIENT OUTREACH (OUTPATIENT)
Dept: SURGERY | Facility: CLINIC | Age: 41
End: 2021-01-15

## 2021-01-15 ASSESSMENT — ENCOUNTER SYMPTOMS
NAUSEA: 1
HEARTBURN: 0
BLOOD IN STOOL: 0
JAUNDICE: 0
ABDOMINAL PAIN: 1
RECTAL PAIN: 0
BOWEL INCONTINENCE: 0
BLOATING: 0
SKIN CHANGES: 0
DIARRHEA: 0
CONSTIPATION: 0

## 2021-01-15 NOTE — PROGRESS NOTES
Pre Visit Call and Assessment    Date of call:  01/15/2021    Phone numbers:  Cell number on file:    Telephone Information:   Mobile [phone]       Reached patient/confirmed appointment:  Yes  Patient care team/Primary provider:  Lars Burt  Preferred outpatient pharmacy:    [facility] [id number] [place] [facility] - [place], [place] - [address]  [address]  [place], [place] [address]  Phone: [phone] Fax: [phone]    Referred to:  Dr. Olman Goss    Reason for visit:  Gallbladder consult

## 2021-01-18 ENCOUNTER — OFFICE VISIT (OUTPATIENT)
Dept: SURGERY | Facility: CLINIC | Age: 41
End: 2021-01-18
Payer: COMMERCIAL

## 2021-01-18 ENCOUNTER — TELEPHONE (OUTPATIENT)
Dept: SURGERY | Facility: CLINIC | Age: 41
End: 2021-01-18

## 2021-01-18 ENCOUNTER — PRE VISIT (OUTPATIENT)
Dept: SURGERY | Facility: CLINIC | Age: 41
End: 2021-01-18

## 2021-01-18 VITALS
HEIGHT: 64 IN | DIASTOLIC BLOOD PRESSURE: 67 MMHG | SYSTOLIC BLOOD PRESSURE: 110 MMHG | HEART RATE: 84 BPM | TEMPERATURE: 98.2 F | RESPIRATION RATE: 18 BRPM | OXYGEN SATURATION: 99 % | BODY MASS INDEX: 25.61 KG/M2 | WEIGHT: 150 LBS

## 2021-01-18 DIAGNOSIS — K80.50 BILIARY COLIC: Primary | ICD-10-CM

## 2021-01-18 PROCEDURE — 99203 OFFICE O/P NEW LOW 30 MIN: CPT | Performed by: SURGERY

## 2021-01-18 RX ORDER — CEFAZOLIN SODIUM 2 G/50ML
2 SOLUTION INTRAVENOUS
Status: CANCELLED | OUTPATIENT
Start: 2021-01-18

## 2021-01-18 RX ORDER — INDOCYANINE GREEN AND WATER 25 MG
2.5 KIT INJECTION ONCE
Status: CANCELLED | OUTPATIENT
Start: 2021-01-18 | End: 2021-01-18

## 2021-01-18 RX ORDER — CEFAZOLIN SODIUM 1 G/50ML
1 INJECTION, SOLUTION INTRAVENOUS SEE ADMIN INSTRUCTIONS
Status: CANCELLED | OUTPATIENT
Start: 2021-01-18

## 2021-01-18 ASSESSMENT — PAIN SCALES - GENERAL: PAINLEVEL: EXTREME PAIN (8)

## 2021-01-18 ASSESSMENT — MIFFLIN-ST. JEOR: SCORE: 1325.4

## 2021-01-18 NOTE — PATIENT INSTRUCTIONS
You met with Dr. Olman Goss.      Today's visit instructions:    Reminder:  Surgery Requirements  1. Your surgery will be at [address] [place] [address]  2. You will need to arrive 2 hours early based on the location of your surgery.  3. You will need someone to drive you home (over 18 years old) and stay with you for 24 hours after the procedure  4. You will need a preop physical with your regular doctor within 30 days of surgery- closer is always better  5. Stop any blood thinners, vitamins, minerals, or herbal supplements 5 days before surgery.  If you are taking a prescribed blood thinner please let us know for specific instructions  6. Fasting- a nurse from [facility] will call you 1-2 days before surgery to confirm your procedure and tell you when to stop eating and drinking  7. Wash with the soap the night before surgery and morning of surgery. See instructions in the Surgery Packet.  8. If you would like a procedure estimate please call [phone].    At this time, any patient that does not have COVID-19 testing within 4 days of surgery and results available to the surgeon and anesthesia team before the procedure may have their procedure postponed or canceled. We do this to keep our patients, providers, and employees safe. If you decline to test, then you will need to contact your surgeon to determine when or if your procedure will still take place.    OR    We highly encourage patients to get tested for COVID-19 at one of our designated [facility] testing sites. We process the tests in our lab, which allows us to get the results quickly. If you choose to get tested at a non-[facility] location, you will need to contact your primary care provider to make those arrangements and ensure the results are available to your surgeon before you arrive for your procedure. If we do not receive the results in time, your procedure may be postponed or canceled. Please make sure your test is collected 3-days  prior to your procedure date. The results will need to get faxed to [phone].       After your Laparoscopic Robotic Cholecystectomy          Incision care     You may take a shower the day after surgery. Carefully wash your incision with soap and water. Do not submerge yourself in water (bath, whirlpool, hot tub, pool, lake) for 14 days after surgery.     Remove the bandage the day after surgery, but leave the medical tape (Steri-Strips) or glue in place. These will loosen and fall off on their own 5 to 7 days after surgery.       Always wash your hands before touching your incisions or removing bandages.     It is not unusual to form a collection of fluid or blood under your incision that may feel firm or squishy- it can take several weeks to months for your body to reabsorb it.  At times, it may even drain.  If that should happen keep the area clean with soap, water,  and cover with a clean gauze dressing. You can change this daily or as needed.       Other medicines     Wait to start aspirin or blood thinners until the day after surgery. You can continue your regular medicines at your normal time the day after surgery.      Your pain medicine may cause constipation (hard, dry stools). To help with this, take the stool softener your doctor gave you or an over-the-counter stool softener or laxative. You can stop taking this when you are no longer taking pain medicine and your bowel movements are back to normal.      For pain or discomfort     Take the narcotic pain medicine your doctor gave you as needed and as instructed on the bottle. If you prefer to use over-the-counter medication, use acetaminophen (Tylenol) or ibuprofen (Advil, Motrin) as instructed on the box. Do not take Tylenol if it is in your narcotic pain medication.     Use an ice pack on your abdomen (belly) for 20 minutes at a time as needed for the first 24 hours. Be sure to protect your skin by putting a cloth between the ice pack and your  skin.     After 24 hours you can switch to heat for 20 minutes as needed. Be sure to protect your skin by putting a cloth between the heat pack and your skin.       Activities     No driving until you feel it s safe to do so. Don t drive while taking narcotic pain medicine.     Don t lift anything heavier than 20 pounds for 3 to 4 weeks after surgery.      Special equipment     None     Diet     You can eat your regular meals after surgery.      When to call the doctor   Call your doctor if you have:     A fever above 101 F (38.3 C) (taken under the tongue), or a fever or chills lasting more than a day.     Redness at the incision site.     Any fluid or blood draining from the incision, especially if it smells bad.      Severe pain that doesn t improve with pain medicine.        We will call you 2 to 4 days after surgery to review this handout, answer questions and help arrange after-surgery care. If you have questions or concerns, please call [phone] during regular office hours. If you need to call after business hours, call [phone] and ask to page the surgeon on-call.         Transversus Abdominis Plane (TAP) Pain Block      What is a TAP block?   A TAP block can help you manage your pain after surgery. TAP stands for transversus abdominis plane, which is a muscle layer in your abdomen (belly). The TAP block uses numbing medicine similar to Novocaine to block pain near the site of your surgery.       Why get a TAP block?     To better manage your pain after surgery. A tap block will help keep the pain from getting severe and out of control.     To block pain signals from the nerve, which helps decrease pain after surgery.     To help you sleep, easily breathe deeply, walk and visit with others.      How is it done?   You will lie still on a table. We will use an ultrasound machine to help us see the correct muscle layer of your abdomen. Then, we ll use a needle to inject the medicine. We may also give  you some sleep medicine to lessen the pain of the injection.       The procedure takes between 5 and 15 minutes. It is usually done right before surgery, but will sometimes be after. It depends on your surgery and care needs.      What can I expect?     You may feel numbness, tingling or a heaviness in your abdomen.      You may have pain control up to 72 hours after surgery.     The TAP block may not lessen all of your surgery pain. But most patients feel 50 to 75 percent less pain than without the block.       Tell your nurse if you have:     Numbness or tingling in areas other than where the injection was     Blurry vision     Ringing in your ears     A metallic taste in your mouth           If you have questions please contact Mykel RN or Joann RN during regular clinic hours, Monday through Friday 7:30 AM - 4:00 PM, or you can contact us via [facility] at anytime.       If you have urgent needs after-hours, weekends, or holidays please call the hospital at [phone] and ask to speak with our on-call General Surgery Team.    Appointment [phone], option #1   Nurse Advice (Mykel David): [phone]   Surgery Scheduler (Veena): [phone]  Fax: [phone]

## 2021-01-18 NOTE — LETTER
1/18/2021       RE: Karen Beverly  [address] [facility] [address]     Dear Colleague,    Thank you for referring your patient, Karen Beverly, to the [facility] [place] [facility]. Please see a copy of my visit note below.    Karen Beverly is a 40 year old female with a several year history of right upper quadrant pain that was intermittent but is becoming more frequent.  Symptoms are associated with fatty food intake.  Associated symptoms: nausea  Other constitutional symptoms: no  Common duct symptoms:  None  Diet tolerance:  good  Patient was not seen in the Emergency Room for these symptoms.  LFT's:  normal    Image studies included:  Nuclear biliary scan  Findings:  Normal gallbladder without inflammation, enlargement or stones  But delayed empty    Past medical and surgical history, medications, allergies, family history, and social history were reviewed with the patient.    ROS: 10 point review of systems negative except noted in HPI   PHYSICAL EXAM  General appearance- healthy, alert, and in no distress.  Skin- Skin color and turgor normal.  No obvious rashes.  Neck- Neck is supple without obvious adenopathy.  Lungs- Respiratory effort unlabored.  Gait- Normal.  Abdomen - soft non distended, non tender without obvious masses.  Impression:  Symptomatic biliary colic  Recommendation:  Laparoscopic Cholecystectomy robot assisted.     Low to nonfat diet until the patient undergoes surgery.    A full discussion regarding the alternatives, risks, goals, and potential complications for this surgery was completed today.  The patient understood that the potential problems included but are not limited to:  Infection, bleeding, bile leak, injury to structures about the gallbladder, possible common duct stone requiring further procedures. Most current review of literature confirm the more common specific risks related to laparoscopic  cholecystectomy include bile duct injury (3/1000), bile leak (10/1000), retained common bile duct stone (10/1000), postcholecystectomy diarrhea (1-2%) and these complications may require additional treatment.    The patient verbally expressed understanding, was given the opportunity for questions, and gives full informed consent for the procedure.      Today the patient was instructed on the need for a preoperative H&P, NPO status prior to surgery, and the need to stop anticoagulants prior to surgery.  Additional educational material, soap, and instructions will be mailed out to the patients home.    The total time spent with this patient was 30 minutes.  Of this time, greater than 50% was spent counseling and coordinating care.      Lovenox:  No      Answers for HPI/ROS submitted by the patient on 1/15/2021   General Symptoms: No  Skin Symptoms: Yes  HENT Symptoms: No  EYE SYMPTOMS: No  HEART SYMPTOMS: No  LUNG SYMPTOMS: No  INTESTINAL SYMPTOMS: Yes  URINARY SYMPTOMS: No  GYNECOLOGIC SYMPTOMS: No  BREAST SYMPTOMS: No  SKELETAL SYMPTOMS: No  BLOOD SYMPTOMS: No  NERVOUS SYSTEM SYMPTOMS: No  MENTAL HEALTH SYMPTOMS: No  Changes in hair: No  Changes in moles/birth marks: No  Itching: Yes  Rashes: Yes  Acne: Yes  Heart burn or indigestion: No  Nausea: Yes  Abdominal pain: Yes  Bloating: No  Constipation: No  Diarrhea: No  Blood in stool: No  Black stools: No  Rectal or Anal pain: No  Fecal incontinence: No  Yellowing of skin or eyes: No  Vomit with blood: No  Change in stools: No        Again, thank you for allowing me to participate in the care of your patient.      Sincerely,    Olman Goss MD

## 2021-01-18 NOTE — PROGRESS NOTES
Karen Beverly is a 40 year old female with a several year history of right upper quadrant pain that was intermittent but is becoming more frequent.  Symptoms are associated with fatty food intake.  Associated symptoms: nausea  Other constitutional symptoms: no  Common duct symptoms:  None  Diet tolerance:  good  Patient was not seen in the Emergency Room for these symptoms.  LFT's:  normal    Image studies included:  Nuclear biliary scan  Findings:  Normal gallbladder without inflammation, enlargement or stones  But delayed empty    Past medical and surgical history, medications, allergies, family history, and social history were reviewed with the patient.    ROS: 10 point review of systems negative except noted in HPI   PHYSICAL EXAM  General appearance- healthy, alert, and in no distress.  Skin- Skin color and turgor normal.  No obvious rashes.  Neck- Neck is supple without obvious adenopathy.  Lungs- Respiratory effort unlabored.  Gait- Normal.  Abdomen - soft non distended, non tender without obvious masses.  Impression:  Symptomatic biliary colic  Recommendation:  Laparoscopic Cholecystectomy robot assisted.     Low to nonfat diet until the patient undergoes surgery.    A full discussion regarding the alternatives, risks, goals, and potential complications for this surgery was completed today.  The patient understood that the potential problems included but are not limited to:  Infection, bleeding, bile leak, injury to structures about the gallbladder, possible common duct stone requiring further procedures. Most current review of literature confirm the more common specific risks related to laparoscopic cholecystectomy include bile duct injury (3/1000), bile leak (10/1000), retained common bile duct stone (10/1000), postcholecystectomy diarrhea (1-2%) and these complications may require additional treatment.    The patient verbally expressed understanding, was given the opportunity for questions, and gives  full informed consent for the procedure.      Today the patient was instructed on the need for a preoperative H&P, NPO status prior to surgery, and the need to stop anticoagulants prior to surgery.  Additional educational material, soap, and instructions will be mailed out to the patients home.    The total time spent with this patient was 30 minutes.  Of this time, greater than 50% was spent counseling and coordinating care.      Lovenox:  No      Answers for HPI/ROS submitted by the patient on 1/15/2021   General Symptoms: No  Skin Symptoms: Yes  HENT Symptoms: No  EYE SYMPTOMS: No  HEART SYMPTOMS: No  LUNG SYMPTOMS: No  INTESTINAL SYMPTOMS: Yes  URINARY SYMPTOMS: No  GYNECOLOGIC SYMPTOMS: No  BREAST SYMPTOMS: No  SKELETAL SYMPTOMS: No  BLOOD SYMPTOMS: No  NERVOUS SYSTEM SYMPTOMS: No  MENTAL HEALTH SYMPTOMS: No  Changes in hair: No  Changes in moles/birth marks: No  Itching: Yes  Rashes: Yes  Acne: Yes  Heart burn or indigestion: No  Nausea: Yes  Abdominal pain: Yes  Bloating: No  Constipation: No  Diarrhea: No  Blood in stool: No  Black stools: No  Rectal or Anal pain: No  Fecal incontinence: No  Yellowing of skin or eyes: No  Vomit with blood: No  Change in stools: No

## 2021-01-18 NOTE — NURSING NOTE
Pre and Post op Patient Education/Teaching Flowsheet  Relevant Diagnosis:  Gallbladder surgery  Teaching Topic:  Pre and post op teaching  Person(s) Involved in teaching:  Patient     Motivation Level:  Asks Questions:  Yes  Eager to Learn:  Yes  Cooperative:  Yes  Receptive (willing/able to accept information):  Yes  Any cultural factors/Anglican beliefs that may influence understanding or compliance?  No    Patient/caregiver/family demonstrates understanding of the following:  Reason for the appointment, diagnosis, and treatment plan:  Yes  Patient demonstrates understanding of the following:  Pre-op bowel prep:  No  Post-op pain management recommendations (medications, ice compress, binder/athletic supporter (if applicable), etc.:  Yes  Inguinal hernia patients:  Post-op urinary retention- discussed signs/symptoms and visit to ER for Mensah catheter placement and to stay in place for at least 48 hours:  NA  Restrictions:  Yes  Medications to take the day of surgery:  Per PCP  Blood thinner medications discussed and when to stop (if applicable):  Yes  Wound care:  Yes  Diabetes medication management (if applicable):  Per PCP  Which situations necessitate calling provider and whom to contact:  Discussed how to contact the hospital, nurse, and clinic scheduling staff if necessary      Date and time of surgery:  Yes  Location of surgery: [address] [place] [address]  History and Physical and any other testing necessary prior to surgery:  Yes, pre op with PCP.  Required time line for completion of History and Physical and any pre-op testing:  Yes  Discuss need for someone to drive patient home and stay with them for 24 hours:  Yes  Pre-op showering/scrub information with Surgical Scrub:  Yes  NPO Guidelines:  NPO per Anesthesia Guidelines  COVID-19 Testing:  Yes    Infection Prevention: Patient demonstrates understanding of the following:  Patient instructed on hand hygiene:  Yes  Surgical procedure site care will be taught  and will be reviewed at the time of discharge  Signs and symptoms of infection taught:  Yes  Wound care reviewed and will be taught at the time of discharge  Central venous catheter care will be taught at the time of discharge (if applicable)    Post-op follow-up:  Instructional materials used/given/mailed:  [facility] Booklet, post op teaching sheet, Map, Soap, and arrival/location information    Surgical instructions mailed to patient

## 2021-01-18 NOTE — NURSING NOTE
"Chief Complaint   Patient presents with     Consult     Pt here for consult for gallbladder       Vitals:    01/18/21 0914   BP: 110/67   BP Location: Left arm   Patient Position: Sitting   Cuff Size: Adult Regular   Pulse: 84   Resp: 18   Temp: 98.2  F (36.8  C)   TempSrc: Oral   SpO2: 99%   Weight: 68 kg (150 lb)   Height: 1.626 m (5' 4\")       Body mass index is 25.75 kg/m .      MAGGY EspanaT                      "

## 2021-01-19 NOTE — TELEPHONE ENCOUNTER
Patient is scheduled for surgery with Dr. Olman Goss    Spoke with or Left message for: Karen Beverly in clinic about surgery dates within the next month, per patient.    Date of Surgery: 2021 at 7:45 AM with Dr. Olman Goss    Location:     [facility], [address])      [place] [address]      [phone]      [email or web address]    Pre-op with surgeon (if applicable): 2021    H&P: Scheduled with NP Clinic Video Visit 2020 at 1:30 PM. Patient is aware that she can call to schedule a pre-op with the Pre-[facility] (P[facility]) if she is unable to schedule with her primary doctor.       Informed patient they will need an adult : Yes  Name of : spouse    Special Equipment: [employer] Robot    COVID-19 testin2021 at 10:00 AM    Surgery packet: will send via Dialogfeed and Mail    Additional comments: She also knows to call general surgery if she experiences any cold or flu symptoms. Surgery teaching and soap were given to in clinic on 2021. She was asked to call [phone] if she needs to reschedule and [phone] if she experiences any symptoms.

## 2021-01-24 DIAGNOSIS — Z11.59 ENCOUNTER FOR SCREENING FOR OTHER VIRAL DISEASES: Primary | ICD-10-CM

## 2021-01-26 ENCOUNTER — VIRTUAL VISIT (OUTPATIENT)
Dept: FAMILY MEDICINE | Facility: CLINIC | Age: 41
End: 2021-01-26
Payer: COMMERCIAL

## 2021-01-26 DIAGNOSIS — E03.8 OTHER SPECIFIED HYPOTHYROIDISM: ICD-10-CM

## 2021-01-26 DIAGNOSIS — Z01.818 PRE-OP EXAM: Primary | ICD-10-CM

## 2021-01-26 PROCEDURE — 99214 OFFICE O/P EST MOD 30 MIN: CPT | Mod: 95 | Performed by: NURSE PRACTITIONER

## 2021-01-26 NOTE — PROGRESS NOTES
[facility] [address]  [address] [facility] [address]  Phone: [phone]  Fax: [phone]  Primary Provider: Lars Burt  Pre-op Performing Provider: ARINA MCGRAW    Karen is a 40 year old who is being evaluated via a billable video visit.      How would you like to obtain your AVS? [employer]hart  If the video visit is dropped, the invitation should be resent by: Jenny  Will anyone else be joining your video visit? No    Video Start Time:     PREOPERATIVE EVALUATION:  Today's date: 1/26/2021    Karen Beverly is a 40 year old female who presents for a preoperative evaluation.    Surgical Information:  Surgery/Procedure: CHOLECYSTECTOMY, ROBOT-ASSISTED, LAPAROSCOPIC  Surgery Location: [facility]  Surgeon: Olman oGss MD  Surgery Date: 02/05/21  Time of Surgery: 11:40am  Where patient plans to recover: At home with family  Fax number for surgical facility: Note does not need to be faxed, will be available electronically in Epic.    Type of Anesthesia Anticipated: Combined General with Block    Subjective     HPI related to upcoming procedure: Karen began experiencing abdominal pain in 2017 when she was pregnant with her son.  The pain was in the RUQ of her abdomen, she had a gallbladder ultrasound which showed gallbladder dysfunction.  The pain had come/gone since then, she recently has had constant pain, everyday , over the past 2 months.    Preop Questions 1/26/2021   1. Have you ever had a heart attack or stroke? No   2. Have you ever had surgery on your heart or blood vessels, such as a stent placement, a coronary artery bypass, or surgery on an artery in your head, neck, heart, or legs? No   3. Do you have chest pain with activity? No   4. Do you have a history of  heart failure? No   5. Do you currently have a cold, bronchitis or symptoms of other infection? No   6. Do you have a cough, shortness of breath, or wheezing? No   7. Do  you or anyone in your family have previous history of blood clots? No   8. Do you or does anyone in your family have a serious bleeding problem such as prolonged bleeding following surgeries or cuts? No   9. Have you ever had problems with anemia or been told to take iron pills? UNKNOWN -    10. Have you had any abnormal blood loss such as black, tarry or bloody stools, or abnormal vaginal bleeding? No   11. Have you ever had a blood transfusion? No   12. Are you willing to have a blood transfusion if it is medically needed before, during, or after your surgery? Yes   13. Have you or any of your relatives ever had problems with anesthesia? No   14. Do you have sleep apnea, excessive snoring or daytime drowsiness? No   15. Do you have any artifical heart valves or other implanted medical devices like a pacemaker, defibrillator, or continuous glucose monitor? No   16. Do you have artificial joints? No   17. Are you allergic to latex? No   18. Is there any chance that you may be pregnant? No        Health Care Directive:  Patient does not have a Health Care Directive or Living Will: Discussed advance care planning with patient; information given to patient to review.- will send a packet to the patient.    Status of Chronic Conditions:  See problem list for active medical problems.  Problems all longstanding and stable, except as noted/documented.  See ROS for pertinent symptoms related to these conditions.       Review of Systems  CONSTITUTIONAL: NEGATIVE for fever, chills, change in weight  INTEGUMENTARY/SKIN: NEGATIVE for worrisome rashes, moles or lesions  EYES: NEGATIVE for vision changes or irritation  ENT/MOUTH: NEGATIVE for ear, mouth and throat problems  RESP: NEGATIVE for significant cough or SOB  BREAST: NEGATIVE for masses, tenderness or discharge  CV: NEGATIVE for chest pain, palpitations or peripheral edema  GI: NEGATIVE for nausea, abdominal pain, heartburn, or change in bowel habits  : NEGATIVE for  frequency, dysuria, or hematuria.  History of endometriosis with DaVinci procedure  MUSCULOSKELETAL: NEGATIVE for significant arthralgias or myalgia  NEURO: NEGATIVE for weakness, dizziness or paresthesias  ENDOCRINE: NEGATIVE for temperature intolerance, skin/hair changes.  History of hypothyroid  HEME: NEGATIVE for bleeding problems  PSYCHIATRIC: NEGATIVE for changes in mood or affect    Patient Active Problem List    Diagnosis Date Noted     Biliary colic 2021     Priority: Medium     Added automatically from request for surgery [id number]       Eczema, unspecified type 2019     Priority: Medium     Pruritic disorder 2019     Priority: Medium     Non-functioning gallbladder 2018     Priority: Medium     Gastroesophageal reflux disease without esophagitis 2017     Priority: Medium     Rhinoconjunctivitis 10/02/2017     Priority: Medium     Chronic sinusitis, unspecified location 10/02/2017     Priority: Medium     Indication for care in labor or delivery 2017     Priority: Medium     Labor and delivery, indication for care 2017     Priority: Medium     Hashimoto's thyroiditis 2016     Priority: Medium     History of multiple miscarriages 2016     Priority: Medium     Thyroid disease, antepartum 2013     Priority: Medium     Elevated TSH 2013     Priority: Medium      Past Medical History:   Diagnosis Date     Eczema      Endometriosis       (normal spontaneous vaginal delivery)     x 2     Thyroid disease      Past Surgical History:   Procedure Laterality Date     DAVINCI PELVIC PROCEDURE  2012    Procedure:DAVINCI PELVIC PROCEDURE; DAVINCI DIAGNOSTIC PELVISCOPY WITH OMNIGUIDE C02 LASER, DIAGNOSTIC HYSTEROSCOPY, EXTENSIVE LYSIS EXCISION OF ENDOMETRIOSIS, BILATERAL URETEROLYSIS, D & C; Surgeon:JOHN KELLY; Location: OR     Current Outpatient Medications   Medication Sig Dispense Refill     clotrimazole (LOTRIMIN) 1 % external cream  Apply topically 2 times daily       clotrimazole (LOTRIMIN) 1 % external cream Apply topically 2 times daily as needed Fungal rash axilla (Patient not taking: Reported on 2021) 45 g 1     fluticasone (FLONASE) 50 MCG/ACT nasal spray INSTILL 2 SPRAYS INTO BOTH NOSTRILS DAILY (Patient not taking: Reported on 2021) 16 mL 0     hydrocortisone (ANUSOL-HC) 2.5 % cream Place rectally 2 times daily as needed for hemorrhoids (Patient not taking: Reported on 2021) 30 g 1     hydrocortisone valerate (WEST-BELKYS) 0.2 % ointment Apply topically 2 times daily Behind ears, eyelids, and underarms (Patient not taking: Reported on 2021) 15 g 0     levothyroxine (SYNTHROID/LEVOTHROID) 75 MCG tablet Take 1 tablet (75 mcg) by mouth daily 90 tablet 3     PRENATAL VITAMINS PO Take 1 tablet by mouth daily.         pyrithione zinc (SELSUN BLUE/HEAD AND SHOULDERS) 1 % external shampoo On the days without Ketoconazole shampoo 240 mL 3     tacrolimus (PROTOPIC) 0.1 % external ointment Apply topically 2 times daily (Patient not taking: Reported on 2021) 100 g 3     triamcinolone (KENALOG) 0.1 % ointment Apply topically 2 times daily Avoid using on face. (Patient not taking: Reported on 2021) 60 g 0       No Known Allergies     Social History     Tobacco Use     Smoking status: Never Smoker     Smokeless tobacco: Never Used   Substance Use Topics     Alcohol use: No     Family History   Problem Relation Age of Onset     Hypertension Mother      Cancer Other         liver cancer     Liver Cancer Maternal Grandmother 79     Hypertension Maternal Grandfather      Heart Disease Paternal Grandmother 62     Ovarian Cancer Paternal Grandmother 92     Heart Disease Paternal Grandfather 60     Breast Cancer Paternal Aunt 59             Melanoma No family hx of      Skin Cancer No family hx of      History   Drug Use No         Objective     There were no vitals taken for this visit.     Virtual video visit    Physical  Exam   GENERAL: Healthy, alert and no distress  EYES: Eyes grossly normal to inspection.  No discharge or erythema, or obvious scleral/conjunctival abnormalities.  RESP: No audible wheeze, cough, or visible cyanosis.  No visible retractions or increased work of breathing.    SKIN: Visible skin clear. No significant rash, abnormal pigmentation or lesions.  NEURO: Cranial nerves grossly intact.  Mentation and speech appropriate for age.  PSYCH: Mentation appears normal, affect normal/bright, judgement and insight intact, normal speech and appearance well-groomed.    Recent Labs   Lab Test 09/28/20  1223 02/18/19  1232   HGB 12.5 12.5    248    139   POTASSIUM 4.1 4.0   CR 0.57 0.55   A1C 5.4  --         Diagnostics:  Labs pending at this time.  Results will be reviewed when available.   No EKG required for low risk surgery (cataract, skin procedure, breast biopsy, etc).    Revised Cardiac Risk Index (RCRI):  The patient has the following serious cardiovascular risks for perioperative complications:   - No serious cardiac risks = 0 points     RCRI Interpretation: 0 points: Class I (very low risk - 0.4% complication rate)  Assessment & Plan   The proposed surgical procedure is considered LOW risk.    1. Pre-op exam    - CBC with platelets differential; Future  - Comprehensive metabolic panel; Future    2. Other specified hypothyroidism    - TSH with free T4 reflex; Future    Medication Instructions: Karen will discuss with surgeon team    Karen also had other questions related to her surgery, she will call the surgeon team for guidance    RECOMMENDATION:  APPROVAL GIVEN to proceed with proposed procedure, without further diagnostic evaluation.    Signed Electronically by: Candie Guo NP    Copy of this evaluation report is provided to requesting physician.    [facility] Guidelines    Revised Cardiac Risk Index

## 2021-02-01 DIAGNOSIS — Z11.59 ENCOUNTER FOR SCREENING FOR OTHER VIRAL DISEASES: ICD-10-CM

## 2021-02-01 DIAGNOSIS — Z01.818 PRE-OP EXAM: ICD-10-CM

## 2021-02-01 DIAGNOSIS — E03.8 OTHER SPECIFIED HYPOTHYROIDISM: ICD-10-CM

## 2021-02-01 LAB
ALBUMIN SERPL-MCNC: 4.3 G/DL (ref 3.4–5)
ALP SERPL-CCNC: 48 U/L (ref 40–150)
ALT SERPL W P-5'-P-CCNC: 22 U/L (ref 0–50)
ANION GAP SERPL CALCULATED.3IONS-SCNC: 5 MMOL/L (ref 3–14)
AST SERPL W P-5'-P-CCNC: 10 U/L (ref 0–45)
BASOPHILS # BLD AUTO: 0 10E9/L (ref 0–0.2)
BASOPHILS NFR BLD AUTO: 0.5 %
BILIRUB SERPL-MCNC: 0.4 MG/DL (ref 0.2–1.3)
BUN SERPL-MCNC: 10 MG/DL (ref 7–30)
CALCIUM SERPL-MCNC: 9.2 MG/DL (ref 8.5–10.1)
CHLORIDE SERPL-SCNC: 108 MMOL/L (ref 94–109)
CO2 SERPL-SCNC: 27 MMOL/L (ref 20–32)
CREAT SERPL-MCNC: 0.68 MG/DL (ref 0.52–1.04)
DIFFERENTIAL METHOD BLD: NORMAL
EOSINOPHIL # BLD AUTO: 0.1 10E9/L (ref 0–0.7)
EOSINOPHIL NFR BLD AUTO: 2.6 %
ERYTHROCYTE [DISTWIDTH] IN BLOOD BY AUTOMATED COUNT: 11.9 % (ref 10–15)
GFR SERPL CREATININE-BSD FRML MDRD: >90 ML/MIN/{1.73_M2}
GLUCOSE SERPL-MCNC: 96 MG/DL (ref 70–99)
HCT VFR BLD AUTO: 37.3 % (ref 35–47)
HGB BLD-MCNC: 12 G/DL (ref 11.7–15.7)
LABORATORY COMMENT REPORT: NORMAL
LYMPHOCYTES # BLD AUTO: 1.4 10E9/L (ref 0.8–5.3)
LYMPHOCYTES NFR BLD AUTO: 33 %
MCH RBC QN AUTO: 30.5 PG (ref 26.5–33)
MCHC RBC AUTO-ENTMCNC: 32.2 G/DL (ref 31.5–36.5)
MCV RBC AUTO: 95 FL (ref 78–100)
MONOCYTES # BLD AUTO: 0.3 10E9/L (ref 0–1.3)
MONOCYTES NFR BLD AUTO: 6.8 %
NEUTROPHILS # BLD AUTO: 2.4 10E9/L (ref 1.6–8.3)
NEUTROPHILS NFR BLD AUTO: 57.1 %
PLATELET # BLD AUTO: 260 10E9/L (ref 150–450)
POTASSIUM SERPL-SCNC: 4.2 MMOL/L (ref 3.4–5.3)
PROT SERPL-MCNC: 7.7 G/DL (ref 6.8–8.8)
RBC # BLD AUTO: 3.94 10E12/L (ref 3.8–5.2)
SARS-COV-2 RNA RESP QL NAA+PROBE: NEGATIVE
SARS-COV-2 RNA RESP QL NAA+PROBE: NORMAL
SODIUM SERPL-SCNC: 140 MMOL/L (ref 133–144)
SPECIMEN SOURCE: NORMAL
SPECIMEN SOURCE: NORMAL
TSH SERPL DL<=0.005 MIU/L-ACNC: 2.11 MU/L (ref 0.4–4)
WBC # BLD AUTO: 4.2 10E9/L (ref 4–11)

## 2021-02-01 PROCEDURE — 80053 COMPREHEN METABOLIC PANEL: CPT | Performed by: NURSE PRACTITIONER

## 2021-02-01 PROCEDURE — 84443 ASSAY THYROID STIM HORMONE: CPT | Performed by: NURSE PRACTITIONER

## 2021-02-01 PROCEDURE — U0005 INFEC AGEN DETEC AMPLI PROBE: HCPCS | Performed by: SURGERY

## 2021-02-01 PROCEDURE — U0003 INFECTIOUS AGENT DETECTION BY NUCLEIC ACID (DNA OR RNA); SEVERE ACUTE RESPIRATORY SYNDROME CORONAVIRUS 2 (SARS-COV-2) (CORONAVIRUS DISEASE [COVID-19]), AMPLIFIED PROBE TECHNIQUE, MAKING USE OF HIGH THROUGHPUT TECHNOLOGIES AS DESCRIBED BY CMS-2020-01-R: HCPCS | Performed by: SURGERY

## 2021-02-01 PROCEDURE — 36415 COLL VENOUS BLD VENIPUNCTURE: CPT | Performed by: NURSE PRACTITIONER

## 2021-02-01 PROCEDURE — 85025 COMPLETE CBC W/AUTO DIFF WBC: CPT | Performed by: NURSE PRACTITIONER

## 2021-02-03 ENCOUNTER — ANESTHESIA EVENT (OUTPATIENT)
Dept: SURGERY | Facility: CLINIC | Age: 41
End: 2021-02-03
Payer: COMMERCIAL

## 2021-02-03 RX ORDER — CETIRIZINE HYDROCHLORIDE 10 MG/1
10 TABLET ORAL DAILY PRN
COMMUNITY

## 2021-02-05 ENCOUNTER — HOSPITAL ENCOUNTER (OUTPATIENT)
Facility: CLINIC | Age: 41
Discharge: HOME | End: 2021-02-05
Attending: SURGERY | Admitting: SURGERY
Payer: COMMERCIAL

## 2021-02-05 ENCOUNTER — ANESTHESIA (OUTPATIENT)
Dept: SURGERY | Facility: CLINIC | Age: 41
End: 2021-02-05
Payer: COMMERCIAL

## 2021-02-05 VITALS
BODY MASS INDEX: 26.08 KG/M2 | TEMPERATURE: 98.4 F | SYSTOLIC BLOOD PRESSURE: 98 MMHG | OXYGEN SATURATION: 100 % | HEART RATE: 73 BPM | HEIGHT: 64 IN | DIASTOLIC BLOOD PRESSURE: 59 MMHG | WEIGHT: 152.78 LBS | RESPIRATION RATE: 16 BRPM

## 2021-02-05 DIAGNOSIS — K80.50 BILIARY COLIC: ICD-10-CM

## 2021-02-05 LAB
GLUCOSE BLDC GLUCOMTR-MCNC: 75 MG/DL (ref 70–99)
HCG UR QL: NEGATIVE

## 2021-02-05 PROCEDURE — 250N000011 HC RX IP 250 OP 636: Performed by: STUDENT IN AN ORGANIZED HEALTH CARE EDUCATION/TRAINING PROGRAM

## 2021-02-05 PROCEDURE — 250N000025 HC SEVOFLURANE, PER MIN: Performed by: SURGERY

## 2021-02-05 PROCEDURE — 81025 URINE PREGNANCY TEST: CPT | Performed by: ANESTHESIOLOGY

## 2021-02-05 PROCEDURE — 88304 TISSUE EXAM BY PATHOLOGIST: CPT | Mod: TC | Performed by: SURGERY

## 2021-02-05 PROCEDURE — 999N000141 HC STATISTIC PRE-PROCEDURE NURSING ASSESSMENT: Performed by: SURGERY

## 2021-02-05 PROCEDURE — 250N000011 HC RX IP 250 OP 636: Performed by: NURSE ANESTHETIST, CERTIFIED REGISTERED

## 2021-02-05 PROCEDURE — 250N000011 HC RX IP 250 OP 636: Performed by: ANESTHESIOLOGY

## 2021-02-05 PROCEDURE — 999N001017 HC STATISTIC GLUCOSE BY METER IP

## 2021-02-05 PROCEDURE — 250N000009 HC RX 250: Performed by: STUDENT IN AN ORGANIZED HEALTH CARE EDUCATION/TRAINING PROGRAM

## 2021-02-05 PROCEDURE — 250N000011 HC RX IP 250 OP 636: Performed by: SURGERY

## 2021-02-05 PROCEDURE — 258N000003 HC RX IP 258 OP 636: Performed by: NURSE ANESTHETIST, CERTIFIED REGISTERED

## 2021-02-05 PROCEDURE — 250N000009 HC RX 250: Performed by: SURGERY

## 2021-02-05 PROCEDURE — 360N000080 HC SURGERY LEVEL 7, PER MIN: Performed by: SURGERY

## 2021-02-05 PROCEDURE — 370N000017 HC ANESTHESIA TECHNICAL FEE, PER MIN: Performed by: SURGERY

## 2021-02-05 PROCEDURE — 710N000012 HC RECOVERY PHASE 2, PER MINUTE: Performed by: SURGERY

## 2021-02-05 PROCEDURE — 258N000003 HC RX IP 258 OP 636: Performed by: ANESTHESIOLOGY

## 2021-02-05 PROCEDURE — 710N000010 HC RECOVERY PHASE 1, LEVEL 2, PER MIN: Performed by: SURGERY

## 2021-02-05 PROCEDURE — 250N000013 HC RX MED GY IP 250 OP 250 PS 637: Performed by: ANESTHESIOLOGY

## 2021-02-05 PROCEDURE — 250N000009 HC RX 250: Performed by: NURSE ANESTHETIST, CERTIFIED REGISTERED

## 2021-02-05 PROCEDURE — 88304 TISSUE EXAM BY PATHOLOGIST: CPT | Mod: 26 | Performed by: PATHOLOGY

## 2021-02-05 PROCEDURE — 258N000003 HC RX IP 258 OP 636: Performed by: STUDENT IN AN ORGANIZED HEALTH CARE EDUCATION/TRAINING PROGRAM

## 2021-02-05 PROCEDURE — 272N000001 HC OR GENERAL SUPPLY STERILE: Performed by: SURGERY

## 2021-02-05 RX ORDER — SODIUM CHLORIDE, SODIUM LACTATE, POTASSIUM CHLORIDE, CALCIUM CHLORIDE 600; 310; 30; 20 MG/100ML; MG/100ML; MG/100ML; MG/100ML
INJECTION, SOLUTION INTRAVENOUS CONTINUOUS
Status: DISCONTINUED | OUTPATIENT
Start: 2021-02-05 | End: 2021-02-05 | Stop reason: HOSPADM

## 2021-02-05 RX ORDER — NALOXONE HYDROCHLORIDE 0.4 MG/ML
0.2 INJECTION, SOLUTION INTRAMUSCULAR; INTRAVENOUS; SUBCUTANEOUS
Status: DISCONTINUED | OUTPATIENT
Start: 2021-02-05 | End: 2021-02-05 | Stop reason: HOSPADM

## 2021-02-05 RX ORDER — FLUMAZENIL 0.1 MG/ML
0.2 INJECTION, SOLUTION INTRAVENOUS
Status: DISCONTINUED | OUTPATIENT
Start: 2021-02-05 | End: 2021-02-05 | Stop reason: HOSPADM

## 2021-02-05 RX ORDER — INDOCYANINE GREEN AND WATER 25 MG
2.5 KIT INJECTION ONCE
Status: COMPLETED | OUTPATIENT
Start: 2021-02-05 | End: 2021-02-05

## 2021-02-05 RX ORDER — ACETAMINOPHEN 325 MG/1
975 TABLET ORAL ONCE
Status: COMPLETED | OUTPATIENT
Start: 2021-02-05 | End: 2021-02-05

## 2021-02-05 RX ORDER — HYDROCODONE BITARTRATE AND ACETAMINOPHEN 5; 325 MG/1; MG/1
1-2 TABLET ORAL EVERY 4 HOURS PRN
Qty: 15 TABLET | Refills: 0 | Status: SHIPPED | OUTPATIENT
Start: 2021-02-05 | End: 2021-07-07

## 2021-02-05 RX ORDER — HYDROMORPHONE HYDROCHLORIDE 1 MG/ML
.3-.5 INJECTION, SOLUTION INTRAMUSCULAR; INTRAVENOUS; SUBCUTANEOUS EVERY 10 MIN PRN
Status: DISCONTINUED | OUTPATIENT
Start: 2021-02-05 | End: 2021-02-05 | Stop reason: HOSPADM

## 2021-02-05 RX ORDER — DIAZEPAM 5 MG
5 TABLET ORAL EVERY 8 HOURS PRN
Qty: 7 TABLET | Refills: 0 | Status: SHIPPED | OUTPATIENT
Start: 2021-02-05 | End: 2021-07-07

## 2021-02-05 RX ORDER — AMOXICILLIN 250 MG
1-2 CAPSULE ORAL 2 TIMES DAILY
Qty: 30 TABLET | Refills: 0 | Status: SHIPPED | OUTPATIENT
Start: 2021-02-05 | End: 2021-07-07

## 2021-02-05 RX ORDER — NALOXONE HYDROCHLORIDE 0.4 MG/ML
0.4 INJECTION, SOLUTION INTRAMUSCULAR; INTRAVENOUS; SUBCUTANEOUS
Status: DISCONTINUED | OUTPATIENT
Start: 2021-02-05 | End: 2021-02-05 | Stop reason: HOSPADM

## 2021-02-05 RX ORDER — BUPIVACAINE HYDROCHLORIDE 2.5 MG/ML
INJECTION, SOLUTION EPIDURAL; INFILTRATION; INTRACAUDAL PRN
Status: DISCONTINUED | OUTPATIENT
Start: 2021-02-05 | End: 2021-02-05

## 2021-02-05 RX ORDER — MEPERIDINE HYDROCHLORIDE 25 MG/ML
12.5 INJECTION INTRAMUSCULAR; INTRAVENOUS; SUBCUTANEOUS
Status: DISCONTINUED | OUTPATIENT
Start: 2021-02-05 | End: 2021-02-05 | Stop reason: HOSPADM

## 2021-02-05 RX ORDER — PROPOFOL 10 MG/ML
INJECTION, EMULSION INTRAVENOUS PRN
Status: DISCONTINUED | OUTPATIENT
Start: 2021-02-05 | End: 2021-02-05

## 2021-02-05 RX ORDER — ONDANSETRON 2 MG/ML
4 INJECTION INTRAMUSCULAR; INTRAVENOUS EVERY 30 MIN PRN
Status: DISCONTINUED | OUTPATIENT
Start: 2021-02-05 | End: 2021-02-05 | Stop reason: HOSPADM

## 2021-02-05 RX ORDER — ONDANSETRON 2 MG/ML
INJECTION INTRAMUSCULAR; INTRAVENOUS PRN
Status: DISCONTINUED | OUTPATIENT
Start: 2021-02-05 | End: 2021-02-05

## 2021-02-05 RX ORDER — FENTANYL CITRATE 50 UG/ML
25-50 INJECTION, SOLUTION INTRAMUSCULAR; INTRAVENOUS
Status: DISCONTINUED | OUTPATIENT
Start: 2021-02-05 | End: 2021-02-05 | Stop reason: HOSPADM

## 2021-02-05 RX ORDER — BUPIVACAINE HYDROCHLORIDE 2.5 MG/ML
INJECTION, SOLUTION INFILTRATION; PERINEURAL PRN
Status: DISCONTINUED | OUTPATIENT
Start: 2021-02-05 | End: 2021-02-05 | Stop reason: HOSPADM

## 2021-02-05 RX ORDER — KETOROLAC TROMETHAMINE 30 MG/ML
15 INJECTION, SOLUTION INTRAMUSCULAR; INTRAVENOUS ONCE
Status: COMPLETED | OUTPATIENT
Start: 2021-02-05 | End: 2021-02-05

## 2021-02-05 RX ORDER — DEXAMETHASONE SODIUM PHOSPHATE 10 MG/ML
INJECTION, SOLUTION INTRAMUSCULAR; INTRAVENOUS PRN
Status: DISCONTINUED | OUTPATIENT
Start: 2021-02-05 | End: 2021-02-05

## 2021-02-05 RX ORDER — DEXAMETHASONE SODIUM PHOSPHATE 4 MG/ML
INJECTION, SOLUTION INTRA-ARTICULAR; INTRALESIONAL; INTRAMUSCULAR; INTRAVENOUS; SOFT TISSUE PRN
Status: DISCONTINUED | OUTPATIENT
Start: 2021-02-05 | End: 2021-02-05

## 2021-02-05 RX ORDER — ONDANSETRON 4 MG/1
4 TABLET, ORALLY DISINTEGRATING ORAL EVERY 30 MIN PRN
Status: DISCONTINUED | OUTPATIENT
Start: 2021-02-05 | End: 2021-02-05 | Stop reason: HOSPADM

## 2021-02-05 RX ORDER — LIDOCAINE 40 MG/G
CREAM TOPICAL
Status: DISCONTINUED | OUTPATIENT
Start: 2021-02-05 | End: 2021-02-05 | Stop reason: HOSPADM

## 2021-02-05 RX ORDER — EPHEDRINE SULFATE 50 MG/ML
INJECTION, SOLUTION INTRAMUSCULAR; INTRAVENOUS; SUBCUTANEOUS PRN
Status: DISCONTINUED | OUTPATIENT
Start: 2021-02-05 | End: 2021-02-05

## 2021-02-05 RX ORDER — CEFAZOLIN SODIUM 2 G/100ML
2 INJECTION, SOLUTION INTRAVENOUS
Status: COMPLETED | OUTPATIENT
Start: 2021-02-05 | End: 2021-02-05

## 2021-02-05 RX ORDER — MULTIVITAMIN,THERAPEUTIC
1 TABLET ORAL DAILY
Status: ON HOLD | COMMUNITY
End: 2023-05-18

## 2021-02-05 RX ORDER — FENTANYL CITRATE 50 UG/ML
INJECTION, SOLUTION INTRAMUSCULAR; INTRAVENOUS PRN
Status: DISCONTINUED | OUTPATIENT
Start: 2021-02-05 | End: 2021-02-05

## 2021-02-05 RX ORDER — CEFAZOLIN SODIUM 1 G/3ML
1 INJECTION, POWDER, FOR SOLUTION INTRAMUSCULAR; INTRAVENOUS SEE ADMIN INSTRUCTIONS
Status: DISCONTINUED | OUTPATIENT
Start: 2021-02-05 | End: 2021-02-05 | Stop reason: HOSPADM

## 2021-02-05 RX ADMIN — CEFAZOLIN 2 G: 10 INJECTION, POWDER, FOR SOLUTION INTRAVENOUS at 12:27

## 2021-02-05 RX ADMIN — ACETAMINOPHEN 975 MG: 325 TABLET, FILM COATED ORAL at 11:26

## 2021-02-05 RX ADMIN — FENTANYL CITRATE 50 MCG: 50 INJECTION, SOLUTION INTRAMUSCULAR; INTRAVENOUS at 11:48

## 2021-02-05 RX ADMIN — PHENYLEPHRINE HYDROCHLORIDE 100 MCG: 10 INJECTION INTRAVENOUS at 12:27

## 2021-02-05 RX ADMIN — FENTANYL CITRATE 125 MCG: 50 INJECTION, SOLUTION INTRAMUSCULAR; INTRAVENOUS at 12:19

## 2021-02-05 RX ADMIN — ONDANSETRON 4 MG: 4 TABLET, ORALLY DISINTEGRATING ORAL at 16:55

## 2021-02-05 RX ADMIN — HYDROMORPHONE HYDROCHLORIDE 0.5 MG: 1 INJECTION, SOLUTION INTRAMUSCULAR; INTRAVENOUS; SUBCUTANEOUS at 14:18

## 2021-02-05 RX ADMIN — KETOROLAC TROMETHAMINE 15 MG: 30 INJECTION, SOLUTION INTRAMUSCULAR at 14:12

## 2021-02-05 RX ADMIN — SODIUM CHLORIDE, POTASSIUM CHLORIDE, SODIUM LACTATE AND CALCIUM CHLORIDE: 600; 310; 30; 20 INJECTION, SOLUTION INTRAVENOUS at 12:07

## 2021-02-05 RX ADMIN — FENTANYL CITRATE 50 MCG: 50 INJECTION, SOLUTION INTRAMUSCULAR; INTRAVENOUS at 13:45

## 2021-02-05 RX ADMIN — FENTANYL CITRATE 50 MCG: 50 INJECTION, SOLUTION INTRAMUSCULAR; INTRAVENOUS at 13:50

## 2021-02-05 RX ADMIN — HYDROMORPHONE HYDROCHLORIDE 0.5 MG: 1 INJECTION, SOLUTION INTRAMUSCULAR; INTRAVENOUS; SUBCUTANEOUS at 14:50

## 2021-02-05 RX ADMIN — DEXAMETHASONE SODIUM PHOSPHATE 2 MG: 10 INJECTION, SOLUTION INTRAMUSCULAR; INTRAVENOUS at 11:30

## 2021-02-05 RX ADMIN — FENTANYL CITRATE 50 MCG: 50 INJECTION, SOLUTION INTRAMUSCULAR; INTRAVENOUS at 13:28

## 2021-02-05 RX ADMIN — BUPIVACAINE HYDROCHLORIDE 60 ML: 2.5 INJECTION, SOLUTION EPIDURAL; INFILTRATION; INTRACAUDAL; PERINEURAL at 11:30

## 2021-02-05 RX ADMIN — MIDAZOLAM 1 MG: 1 INJECTION INTRAMUSCULAR; INTRAVENOUS at 11:48

## 2021-02-05 RX ADMIN — ONDANSETRON 4 MG: 2 INJECTION INTRAMUSCULAR; INTRAVENOUS at 13:06

## 2021-02-05 RX ADMIN — ROCURONIUM BROMIDE 60 MG: 10 INJECTION INTRAVENOUS at 12:19

## 2021-02-05 RX ADMIN — FENTANYL CITRATE 75 MCG: 50 INJECTION, SOLUTION INTRAMUSCULAR; INTRAVENOUS at 13:22

## 2021-02-05 RX ADMIN — HYDROMORPHONE HYDROCHLORIDE 0.5 MG: 1 INJECTION, SOLUTION INTRAMUSCULAR; INTRAVENOUS; SUBCUTANEOUS at 14:05

## 2021-02-05 RX ADMIN — PROPOFOL 150 MG: 10 INJECTION, EMULSION INTRAVENOUS at 12:19

## 2021-02-05 RX ADMIN — SUGAMMADEX 200 MG: 100 INJECTION, SOLUTION INTRAVENOUS at 13:09

## 2021-02-05 RX ADMIN — DEXAMETHASONE SODIUM PHOSPHATE 4 MG: 4 INJECTION, SOLUTION INTRA-ARTICULAR; INTRALESIONAL; INTRAMUSCULAR; INTRAVENOUS; SOFT TISSUE at 12:34

## 2021-02-05 RX ADMIN — Medication 10 MG: at 12:53

## 2021-02-05 RX ADMIN — MIDAZOLAM 2 MG: 1 INJECTION INTRAMUSCULAR; INTRAVENOUS at 12:01

## 2021-02-05 RX ADMIN — HYDROMORPHONE HYDROCHLORIDE 0.5 MG: 1 INJECTION, SOLUTION INTRAMUSCULAR; INTRAVENOUS; SUBCUTANEOUS at 14:32

## 2021-02-05 RX ADMIN — DEXMEDETOMIDINE HYDROCHLORIDE 40 MCG: 100 INJECTION, SOLUTION INTRAVENOUS at 11:30

## 2021-02-05 RX ADMIN — INDOCYANINE GREEN AND WATER 2.5 MG: KIT at 11:24

## 2021-02-05 ASSESSMENT — MIFFLIN-ST. JEOR: SCORE: 1338

## 2021-02-05 NOTE — OP NOTE
Procedure Date: 02/05/2021      POSTOPERATIVE DIAGNOSIS:  Biliary colic.      OPERATIVE PROCEDURE:  Laparoscopic cholecystectomy, robot-assisted.      SURGEON:  Olman Goss MD      ANESTHESIA:  General endotracheal.      INDICATIONS FOR PROCEDURE:  The patient presents with biliary colic and delayed emptying on a nuclear medicine biliary scan.  Informed consent was obtained.      OPERATIVE FINDINGS:  Normal cystic duct and cystic artery anatomy, please refer to below.      DESCRIPTION OF PROCEDURE:  The patient was brought to the operating room, put under general anesthesia, abdomen widely prepped and draped in the usual sterile fashion.  Infraumbilical skin incision was made, open technique used, Anthony port placed, abdomen insufflated.  Eight ports were placed left and right lateral per routine technique per routine technique.  A 5 accessory port was then placed in left subcostal under direct vision.  This was used to grasp the gallbladder and note good visualization with good retraction of the falciform ligament.  At this point, the robot was docked.  Attention turned to the console where the gallbladder was pulled out laterally.  The Firefly technique was used to identify the cystic duct and the common duct.  Cystic duct was dissected proximally and distally, clipped with Hem-o-marivel large clips and then transected using cutting cautery.  The gallbladder was then taken off the liver bed using electrocautery.  Excellent hemostasis was achieved.  At this point, the robot was undocked.  Attention turned to laparoscopic technique where the gallbladder was grasped and then delivered through the infraumbilical port site without spillage of bile or stones.  At this point, all ports were removed.  Fascial defect was closed with 0 Vicryl, skin with subcuticular, Steri-Strips were applied.  Estimated blood loss was minimal.  The patient tolerated the procedure well and was taken to the recovery room, where she was  without difficulty or apparent complication.         SARY HEART MD             D: 2021   T: 2021   MT:       Name:     GIOVANNI LEE   MRN:      [id number]        Account:        [id number]   :      1980           Procedure Date: 2021      Document: [id number]

## 2021-02-05 NOTE — ANESTHESIA CARE TRANSFER NOTE
Patient: Karen Beverly    Procedure(s):  CHOLECYSTECTOMY, ROBOT-ASSISTED, LAPAROSCOPIC    Diagnosis: Biliary colic [K80.50]  Diagnosis Additional Information: No value filed.    Anesthesia Type:   No value filed.     Note:    Oropharynx: oropharynx clear of all foreign objects and spontaneously breathing  Level of Consciousness: awake  Oxygen Supplementation: face mask  Level of Supplemental Oxygen (L/min / FiO2): 6  Independent Airway: airway patency satisfactory and stable  Dentition: dentition unchanged  Vital Signs Stable: post-procedure vital signs reviewed and stable  Report to RN Given: handoff report given  Patient transferred to: PACU    Handoff Report: Identifed the Patient, Identified the Reponsible Provider, Reviewed the pertinent medical history, Discussed the surgical course, Reviewed Intra-OP anesthesia mangement and issues during anesthesia, Set expectations for post-procedure period and Allowed opportunity for questions and acknowledgement of understanding      Vitals: (Last set prior to Anesthesia Care Transfer)  CRNA VITALS  2/5/2021 1250 - 2/5/2021 1329      2/5/2021             Resp Rate (observed):  (!) 7            110/64-70-18-98%      Electronically Signed By: ULICES Camp CRNA  February 5, 2021  1:29 PM

## 2021-02-05 NOTE — BRIEF OP NOTE
[facility] Brief Operative Note    Pre-operative diagnosis: Biliary colic [K80.50]   Post-operative diagnosis Same as Pre-op Dx   Procedure: Procedure(s):  CHOLECYSTECTOMY, ROBOT-ASSISTED, LAPAROSCOPIC   Surgeon: Olman Goss MD   Assistants(s):    Estimated blood loss: * No values recorded between 2/5/2021 12:36 PM and 2/5/2021  1:23 PM *    Specimens: GB   Findings: yes

## 2021-02-05 NOTE — OR NURSING
Bilateral TAP block performed without complications. 50mcg Fenatnyl and 1mg Versed administered and well tolerated. Pt vitally stable throughout block.

## 2021-02-05 NOTE — ANESTHESIA POSTPROCEDURE EVALUATION
Patient: Karen Beverly    Procedure(s):  CHOLECYSTECTOMY, ROBOT-ASSISTED, LAPAROSCOPIC    Diagnosis:Biliary colic [K80.50]  Diagnosis Additional Information: No value filed.    Anesthesia Type:  General    Note:  Disposition: Admission   Postop Pain Control: Uneventful            Sign Out: Well controlled pain   PONV: No   Neuro/Psych: Uneventful            Sign Out: Acceptable/Baseline neuro status   Airway/Respiratory: Uneventful            Sign Out: Acceptable/Baseline resp. status   CV/Hemodynamics: Uneventful            Sign Out: Acceptable CV status   Other NRE: NONE   DID A NON-ROUTINE EVENT OCCUR? No         Last vitals:  Vitals:    02/05/21 1400 02/05/21 1415 02/05/21 1430   BP: 104/63 99/62 104/66   Pulse: 68 73 81   Resp: 12 14 18   Temp:      SpO2: 96% 98% 98%       Last vitals prior to Anesthesia Care Transfer:  CRNA VITALS  2/5/2021 1250 - 2/5/2021 1350      2/5/2021             Resp Rate (observed):  (!) 7          Electronically Signed By: Martin Arellano MD  February 5, 2021  2:43 PM

## 2021-02-05 NOTE — ANESTHESIA PROCEDURE NOTES
Airway   Date/Time: 2/5/2021 12:21 PM   Patient location during procedure: OR  Staff -   CRNA: Caroline Sin APRN CRNA  Performed By: CRNA    Consent for Airway   Urgency: elective    Indications and Patient Condition  Indications for airway management: cydney-procedural  Mask difficulty assessment: 1 - vent by mask    Final Airway Details  Final airway type: endotracheal airway  Successful airway:ETT - single  Endotracheal Airway Details   ETT size (mm): 7.0  Cuffed: yes  Successful intubation technique: direct laryngoscopy  Grade View of Cords: 1  Adjucts: stylet  Measured from: lips  Secured at (cm): 21  Secured with: pink tape  Bite block used: None    Post intubation assessment   Placement verified by: capnometry, equal breath sounds and chest rise   Number of attempts at approach: 1  Secured with:pink tape  Ease of procedure: easy  Dentition: Intact and Unchanged

## 2021-02-05 NOTE — ANESTHESIA PREPROCEDURE EVALUATION
Anesthesia Pre-Procedure Evaluation    Patient: Karen Beverly   MRN: [id number] : 1980        Preoperative Diagnosis: Biliary colic [K80.50]   Procedure : Procedure(s):  CHOLECYSTECTOMY, ROBOT-ASSISTED, LAPAROSCOPIC     Past Medical History:   Diagnosis Date     Eczema      Endometriosis       (normal spontaneous vaginal delivery)     x 2     Thyroid disease       Past Surgical History:   Procedure Laterality Date     DAVINCI PELVIC PROCEDURE  2012    Procedure:DAVINCI PELVIC PROCEDURE; DAVINCI DIAGNOSTIC PELVISCOPY WITH OMNIGUIDE C02 LASER, DIAGNOSTIC HYSTEROSCOPY, EXTENSIVE LYSIS EXCISION OF ENDOMETRIOSIS, BILATERAL URETEROLYSIS, D & C; Surgeon:JOHN KELLY; Location:[facility]      Allergies   Allergen Reactions     Augmentin Nausea and Vomiting     Nickel Rash      Social History     Tobacco Use     Smoking status: Never Smoker     Smokeless tobacco: Never Used   Substance Use Topics     Alcohol use: No      Wt Readings from Last 1 Encounters:   21 69.3 kg (152 lb 12.5 oz)        Anesthesia Evaluation   Pt has had prior anesthetic.     No history of anesthetic complications       ROS/MED HX  ENT/Pulmonary:  - neg pulmonary ROS     Neurologic:  - neg neurologic ROS     Cardiovascular:  - neg cardiovascular ROS     METS/Exercise Tolerance: 4 - Raking leaves, gardening    Hematologic:       Musculoskeletal:       GI/Hepatic: Comment: Biliary colic    (+) GERD,     Renal/Genitourinary:  - neg Renal ROS     Endo:     (+) thyroid problem, hypothyroidism,     Psychiatric/Substance Use:       Infectious Disease:       Malignancy:       Other:            Physical Exam    Airway        Mallampati: I   TM distance: > 3 FB   Neck ROM: full   Mouth opening: > 3 cm    Respiratory Devices and Support         Dental           Cardiovascular          Rhythm and rate: regular and normal     Pulmonary           breath sounds clear to auscultation           OUTSIDE LABS:  CBC:   Lab Results   Component  Value Date    WBC 4.2 02/01/2021    WBC 4.9 09/28/2020    HGB 12.0 02/01/2021    HGB 12.5 09/28/2020    HCT 37.3 02/01/2021    HCT 38.8 09/28/2020     02/01/2021     09/28/2020     BMP:   Lab Results   Component Value Date     02/01/2021     09/28/2020    POTASSIUM 4.2 02/01/2021    POTASSIUM 4.1 09/28/2020    CHLORIDE 108 02/01/2021    CHLORIDE 105 09/28/2020    CO2 27 02/01/2021    CO2 27 09/28/2020    BUN 10 02/01/2021    BUN 11 09/28/2020    CR 0.68 02/01/2021    CR 0.57 09/28/2020    GLC 96 02/01/2021    GLC 96 09/28/2020     COAGS:   Lab Results   Component Value Date    INR 1.06 04/02/2018     POC:   Lab Results   Component Value Date    BGM 75 02/05/2021    HCG Negative 02/05/2021    HCGS Positive (A) 02/09/2016     HEPATIC:   Lab Results   Component Value Date    ALBUMIN 4.3 02/01/2021    PROTTOTAL 7.7 02/01/2021    ALT 22 02/01/2021    AST 10 02/01/2021    ALKPHOS 48 02/01/2021    BILITOTAL 0.4 02/01/2021     OTHER:   Lab Results   Component Value Date    A1C 5.4 09/28/2020    TRAN 9.2 02/01/2021    TSH 2.11 02/01/2021    T4 1.04 09/28/2020    T3 100 09/28/2020       Anesthesia Plan    ASA Status:  2   NPO Status:  NPO Appropriate    Anesthesia Type: General     - Airway: ETT      Maintenance: Balanced.        Consents    Anesthesia Plan(s) and associated risks, benefits, and realistic alternatives discussed. Questions answered and patient/representative(s) expressed understanding.     - Discussed with:  Patient         Postoperative Care            Comments:                Rob Michel MD

## 2021-02-05 NOTE — DISCHARGE INSTRUCTIONS
[facility]  Same-Day Surgery   Adult Discharge Orders & Instructions     For 24 hours after surgery    1. Get plenty of rest.  A responsible adult must stay with you for at least 24 hours after you leave the hospital.   2. Do not drive or use heavy equipment.  If you have weakness or tingling, don't drive or use heavy equipment until this feeling goes away.  3. Do not drink alcohol.  4. Avoid strenuous or risky activities.  Ask for help when climbing stairs.   5. You may feel lightheaded.  IF so, sit for a few minutes before standing.  Have someone help you get up.   6. If you have nausea (feel sick to your stomach): Drink only clear liquids such as apple juice, ginger ale, broth or 7-Up.  Rest may also help.  Be sure to drink enough fluids.  Move to a regular diet as you feel able.  7. You may have a slight fever. Call the doctor if your fever is over 100 F (37.7 C) (taken under the tongue) or lasts longer than 24 hours.  8. You may have a dry mouth, a sore throat, muscle aches or trouble sleeping.  These should go away after 24 hours.  9. Do not make important or legal decisions.   Call your doctor for any of the followin.  Signs of infection (fever, growing tenderness at the surgery site, a large amount of drainage or bleeding, severe pain, foul-smelling drainage, redness, swelling).    2. It has been over 8 to 10 hours since surgery and you are still not able to urinate (pass water).    3.  Headache for over 24 hours.    .  To contact a doctor, call Dr. Goss's clinic at #[phone] or:        [phone] and ask for the resident on call for surgery (answered 24 hours a day)      Emergency Department:    [facility]: [phone]       (TTY for hearing impaired: [phone])

## 2021-02-05 NOTE — ANESTHESIA PROCEDURE NOTES
Pre-Procedure   Staff -   Anesthesiologist:  César Andino MD  Resident/Fellow: Eri Jacobs MD  Performed By: resident  Location: pre-op  Pre-Anesthestic Checklist: patient identified, IV checked, site marked, risks and benefits discussed, informed consent, monitors and equipment checked, pre-op evaluation, at physician/surgeon's request and post-op pain management  Timeout:  Correct Patient: Yes   Correct Procedure: Yes   Correct Site: Yes   Correct Position: Yes   Correct Laterality: Yes   Site Marked: Yes    Procedure Documentation  Procedure: TAP  Laterality: bilateral  Patient Position:supine  Patient Prep/Sterile Barriers: sterile gloves, mask, Chloraprep  Needle type: short bevel  Needle Gauge: 21.   Needle Length (millimeters) 110   Ultrasound guided, Ultrasound used to identify targeted nerve, plexus, vascular marker, or fascial plane and place a needle adjacent to it in real-time, Ultrasound was used to visualize the spread of anesthetic in close proximity to the above referenced structure  A permanent image is entered into the patient's record.    Assessment/Narrative      The placement was negative for: blood aspirated, painful injection and site bleeding  Paresthesias: No.  Bolus given via needle. No blood aspirated via catheter.   Secured via.   Insertion/Infusion Method: Single Shot  Complications: none

## 2021-02-05 NOTE — PROGRESS NOTES
Paged Dr. Goss. 9/10 pain. Tylenol given preop. Toradol? please advise thanks     MD BRANHAM ordered a one time dose of Toradol 15mg IV. Franny Conti RN on 2/5/2021 at 2:10 PM

## 2021-02-08 ENCOUNTER — PATIENT OUTREACH (OUTPATIENT)
Dept: SURGERY | Facility: CLINIC | Age: 41
End: 2021-02-08

## 2021-02-08 LAB — COPATH REPORT: NORMAL

## 2021-02-08 NOTE — PROGRESS NOTES
RN Post-Op/Post-Discharge Care Coordination Note    Ms. Karen Lee is a 40 year old female who underwent robotic cholecystectomy on 2/5 with  Dr. Olman Heart.  Spoke with Patient.    Support  Patient able to care for self independently     Health Status  Fevers/chills: Patient denies any fever or chills.  Nausea/Vomiting: Patient reports a lack of appetite with nausea yesterday that has resolved. Recommended small frequent meals.  Eating/drinking: Patient is able to eat and drink without any complaints.  Bowel habits: Patient reports having a normal bowel movement.  Drains (SPENSER): N/A  Incisions: Patient denies any signs and symptoms of infection..  Wound closure:  Steri-strips  Pain: Improving.  She has stopped taking Norco and has transitioned to OTC Tylenol PRN per package instructions  New Medications:  Valium, Senna, Norco    Activity/Restrictions  No lifting in excess of 15-20 pounds for 3-4 weeks    Equipment  None    Pathology reviewed with patient:  No, not yet available    Forms/Letters  No    All of her questions were answered including reviewing restrictions, pathology, and wound care.  She will call this office if she has any further questions and/or concerns.      In lieu of a post-op clinic patient that patient would like to be contacted in approximately 7-10 days via telephone.    A copy of this note was routed to the primary surgeon.      Whom and When to Call  Patient acknowledges understanding of how to manage any medication changes and   when to seek medical care.     Patient advised that if after hour medical concerns arise to please call [phone] and choose option 4 to speak to the physician on call.     Pathology:  Copath Report Patient Name: KAREN LEE   MR#: [id number]   Specimen #: [id number]   Collected: 2/5/2021   Received: 2/5/2021   Reported: 2/8/2021 14:26   Ordering Phy(s): OLMAN HEART     For improved result formatting, select 'View Enhanced Report Format'  under    Linked Documents section.     SPECIMEN(S):   Gallbladder and contents     FINAL DIAGNOSIS:   GALLBLADDER, CHOLECYSTECTOMY:   - Chronic cholecystitis, cholesterolosis

## 2021-02-16 ENCOUNTER — PATIENT OUTREACH (OUTPATIENT)
Dept: SURGERY | Facility: CLINIC | Age: 41
End: 2021-02-16

## 2021-02-16 NOTE — PROGRESS NOTES
Karen Beverly was contacted several days post procedure via telephone for a status update and elected to have a telephone follow -up call approximately 7-10 days after original contact in lieu of a clinic visit with Dr. Olman Goss.  She continues to do well and the steri-strips are starting to peel off. The patients wounds are healed and the area is C/D/I.  She is afebrile and mona po; the patient is slowly resuming her normal activity.   Discussed restrictions including no lifting in excess of 15-20 pounds for 3 weeks.    Patient reports that she experiences intermittent RUQ pain and nausea which requires being medicated with OTC analgesics.  Denies being related to PO intake. Additionally, she is experiencing intermittent constipation which requires the use of a stool softener.    Post op video appointment arranged 2/25 at 3:30 PM.  She declined a sooner appointment in hopes that the symptoms will resolve.

## 2021-02-24 ENCOUNTER — PATIENT OUTREACH (OUTPATIENT)
Dept: SURGERY | Facility: CLINIC | Age: 41
End: 2021-02-24

## 2021-02-24 NOTE — PROGRESS NOTES
Patient Telephone Reminder Call    Date of call:  02/24/21  Phone numbers:  Home number on file [phone] (home)    Reached patient/confirmed appointment:  Yes  Appointment with:   Dr. Olman Goss  Reason for visit:  PO

## 2021-02-25 ENCOUNTER — VIRTUAL VISIT (OUTPATIENT)
Dept: SURGERY | Facility: CLINIC | Age: 41
End: 2021-02-25
Payer: COMMERCIAL

## 2021-02-25 VITALS — WEIGHT: 144 LBS | BODY MASS INDEX: 24.59 KG/M2 | HEIGHT: 64 IN

## 2021-02-25 DIAGNOSIS — Z98.890 POSTOPERATIVE STATE: Primary | ICD-10-CM

## 2021-02-25 PROCEDURE — 99024 POSTOP FOLLOW-UP VISIT: CPT | Performed by: SURGERY

## 2021-02-25 ASSESSMENT — PAIN SCALES - GENERAL: PAINLEVEL: MODERATE PAIN (5)

## 2021-02-25 ASSESSMENT — MIFFLIN-ST. JEOR: SCORE: 1298.18

## 2021-02-25 NOTE — LETTER
2/25/2021       RE: Karen Beverly  [address]  [place] [address]     Dear Colleague,    Thank you for referring your patient, Karen Beverly, to the [facility] GENERAL SURGERY [facility] at [facility]. Please see a copy of my visit note below.    Karen is a 40 year old who is being evaluated via a billable video visit.      How would you like to obtain your AVS? MyChart  If the video visit is dropped, the invitation should be resent by: Text to cell phone: [phone]  Will anyone else be joining your video visit? No      Telephone visit    Karen Beverly is status post:  Lap vamsi 20 days ago.  Surgery without complications.  Post-op course without complications.  Now complaining of nausea and heartburn.  Otherwise tolerating diet.  Wants to hold off on further evaluation until back from spring break.  If continues with GI complaints will order ultrasound and LFTs   If continues to improve then follow up prn    Originating Location (pt. Location): Home    Distant Location (provider location):  [facility]     Platform used for Video Visit: [employer]    Again, thank you for allowing me to participate in the care of your patient.      Sincerely,    Olman Goss MD

## 2021-02-25 NOTE — PROGRESS NOTES
Karen is a 40 year old who is being evaluated via a billable video visit.      How would you like to obtain your AVS? MyChart  If the video visit is dropped, the invitation should be resent by: Text to cell phone: [phone]  Will anyone else be joining your video visit? No      Telephone visit    Karen Beverly is status post:  Lap vamsi 20 days ago.  Surgery without complications.  Post-op course without complications.  Now complaining of nausea and heartburn.  Otherwise tolerating diet.  Wants to hold off on further evaluation until back from spring break.  If continues with GI complaints will order ultrasound and LFTs   If continues to improve then follow up prn    Originating Location (pt. Location): Home    Distant Location (provider location):  [facility] GENERAL SURGERY CLINIC [place]     Platform used for Video Visit: "[employer]   80

## 2021-02-25 NOTE — PATIENT INSTRUCTIONS
You met with Dr. Olman Goss.      Today's visit instructions:    Return to the Surgery Clinic on an as needed basis.    Please call our office if the nausea does not resolve.  Dr. Goss would like to then have you undergo an ultrasound and lab work.     If you have questions please contact Joann CRESPO during regular clinic hours, Monday through Friday 7:30 AM - 4:00 PM, or you can contact us via [employer] at anytime.       If you have urgent needs after-hours, weekends, or holidays please call the hospital at [phone] and ask to speak with our on-call General Surgery Team.    Appointment [phone], option #1   Nurse Advice (Joann): [phone]   Surgery Scheduler (Veena): [phone]  Fax: [phone]

## 2021-02-25 NOTE — NURSING NOTE
"Chief Complaint   Patient presents with     RECHECK     RUQ pain/nausea.       Vitals:    02/25/21 1501   Weight: 65.3 kg (144 lb)   Height: 1.626 m (5' 4\")       Body mass index is 24.72 kg/m .                            FREDERICK CROFT, EMT    "

## 2021-07-05 ASSESSMENT — ENCOUNTER SYMPTOMS
BREAST MASS: 0
HEARTBURN: 1
DIARRHEA: 0
HOT FLASHES: 0
JOINT SWELLING: 1
ABDOMINAL PAIN: 1
CONSTIPATION: 0
ARTHRALGIAS: 1
DECREASED LIBIDO: 0
BREAST PAIN: 1
MUSCLE WEAKNESS: 1
MUSCLE CRAMPS: 1
RECTAL PAIN: 0
BACK PAIN: 0
BLOATING: 0
MYALGIAS: 0
VOMITING: 0
BOWEL INCONTINENCE: 0
NAUSEA: 0
JAUNDICE: 0
NECK PAIN: 0
BLOOD IN STOOL: 0
STIFFNESS: 1

## 2021-07-06 NOTE — PROGRESS NOTES
[facility]  Primary Care Center   Lars Burt MD  7/7/21     Chief Complaint:   Physical   Joint pain  Vaginal dischange    History of Present Illness:   Karen Beverly is a 41 year old female with a history of endometriosis, Hashimoto's thyroiditis, and non-functioning gallbladder s/p laparoscopic cholecystectomy who presents for physical.  She has several other concerns and was advised on split billing.  Joint pain  She has noted pain/ stiffness in her fingers for 2 months. Aching sensation in hands and legs. She noted some symptoms after the Covid vaccine. She has not tried medication other than rarely will take tylenol.  She notes a 8/10 pain on the worst day. She has not noted deformities or redness. She works in her garden wondering about tick exposure but has not seen a rash.   Vaginal discharge/ breast lacation  She is having vaginal discharge for the last month some itching. Menses have spaced slightly 21-28 days and has some pain with menses. Not using a method of contraception. One partner her . Agrees to STI check.   She still has lactation stopped breast feeding over one year ago, last mammogram normal, Prolactin normal, lactation may be part of Thyroid condition.  Cholecystectomy  She had intermittent abdominal pain delayed further management of her nonfunctioning gallbladder due to pandemic but completed laparoscopic cholecystectomy. Liver hemangioma has been stable throughout imaging. Occasional GERD relieved with tums OK to use pepcid if needed  Mood   She feels depressed and worried at times related to symptoms but PHQ9 and GAD7 in low score range  Seasonal allergies  She has seasonal allergy symptoms that are present from May through winter freeze.  She has used Zyrtec with some improvement. She utilized Flonase installs the spray with the opposite hand to avoid trauma to the nasal septum.  Eczema  Her eczema is currently stable.      Healthcare Maintenance   Pap (females age 21 -  65): every 3 years, every 5 years after age 35 if cotesting done -   PAP 2/2019 NIL hpv neg next in 2024 one  Slightly abnormal pap in past  Lab Results   Component Value Date    PAP ASC-US 02/01/2016   Glucose: every 5 years, yearly if risk factors? - recommend  Lipid panel: every 5 years, yearly if risk factors - up to date  HIV (ages 13 - 64): once per lifetime, yearly if MSM or other risk factors - Up to Date 2012  Immunizations (Tetanus every 10 years, Influenza yearly, Pneumonia PPV-23 and PCV-13 age ? 65 or sooner if risk factors) - Up to Date   Immunization History   Administered Date(s) Administered     TDAP Vaccine (Boostrix) 01/05/2017      The following health maintenance issues were also reviewed and addressed as needed:  Blood pressure (>18 years annually)  Lifestyle habits (including exercise, diet, weight)  Health risk behaviors (sexual history, alcohol, tobacco, drug use, partner violence)  Routine eye, dental, hearing, and skin exams  Advanced directives    Review of Systems:   Pertinent items are noted in HPI and below, remainder of complete ROS is negative.    Answers for HPI/ROS submitted by the patient on 7/5/2021   General Symptoms: No  Skin Symptoms: No  HENT Symptoms: No  EYE SYMPTOMS: No  HEART SYMPTOMS: No  LUNG SYMPTOMS: No  INTESTINAL SYMPTOMS: Yes  URINARY SYMPTOMS: No  GYNECOLOGIC SYMPTOMS: Yes  BREAST SYMPTOMS: Yes  SKELETAL SYMPTOMS: Yes  BLOOD SYMPTOMS: No  NERVOUS SYSTEM SYMPTOMS: No  MENTAL HEALTH SYMPTOMS: No  Heart burn or indigestion: Yes  Nausea: No  Vomiting: No  Abdominal pain: Yes  Bloating: No  Constipation: No  Diarrhea: No  Blood in stool: No  Black stools: No  Rectal or Anal pain: No  Fecal incontinence: No  Yellowing of skin or eyes: No  Vomit with blood: No  Change in stools: No  Back pain: No  Muscle aches: No  Neck pain: No  Swollen joints: Yes  Joint pain: Yes  Bone pain: No  Muscle cramps: Yes  Muscle weakness: Yes  Joint stiffness: Yes  Bone fracture:  No  Bleeding or spotting between periods: No  Heavy or painful periods: No  Irregular periods: No  Vaginal discharge: Yes  Hot flashes: No  Vaginal dryness: No  Genital ulcers: No  Reduced libido: No  Painful intercourse: No  Difficulty with sexual arousal: No  Post-menopausal bleeding: No  Discharge: Yes  Lumps: No  Pain: Yes  Nipple retraction: No        Labs reviewed in EPIC  BP Readings from Last 3 Encounters:   07/07/21 102/66   02/05/21 98/59   01/18/21 110/67    Wt Readings from Last 3 Encounters:   07/07/21 70.3 kg (155 lb)   02/25/21 65.3 kg (144 lb)   02/05/21 69.3 kg (152 lb 12.5 oz)                  Patient Active Problem List   Diagnosis     Elevated TSH     Thyroid disease, antepartum     History of multiple miscarriages     Hashimoto's thyroiditis     Indication for care in labor or delivery     Labor and delivery, indication for care     Rhinoconjunctivitis     Chronic sinusitis, unspecified location     Gastroesophageal reflux disease without esophagitis     Non-functioning gallbladder     Eczema, unspecified type     Pruritic disorder     Biliary colic     Past Surgical History:   Procedure Laterality Date     DAVINCI LAPAROSCOPIC CHOLECYSTECTOMY WITHOUT GRAMS N/A 2/5/2021    Procedure: CHOLECYSTECTOMY, ROBOT-ASSISTED, LAPAROSCOPIC;  Surgeon: Olamn Goss MD;  Location: [facility] OR     DAVINCI PELVIC PROCEDURE  2/22/2012    Procedure:DAVINCI PELVIC PROCEDURE; DAVINCI DIAGNOSTIC PELVISCOPY WITH OMNIGUIDE C02 LASER, DIAGNOSTIC HYSTEROSCOPY, EXTENSIVE LYSIS EXCISION OF ENDOMETRIOSIS, BILATERAL URETEROLYSIS, D & C; Surgeon:JOHN KELLY; Location: OR       Social History     Tobacco Use     Smoking status: Never Smoker     Smokeless tobacco: Never Used   Substance Use Topics     Alcohol use: No     Family History   Problem Relation Age of Onset     Hypertension Mother      Cancer Other         liver cancer     Liver Cancer Maternal Grandmother 79     Hypertension Maternal Grandfather       Heart Disease Paternal Grandmother 62     Ovarian Cancer Paternal Grandmother 92     Heart Disease Paternal Grandfather 60     Breast Cancer Paternal Aunt 59             Melanoma No family hx of      Skin Cancer No family hx of          Current Outpatient Medications   Medication Sig Dispense Refill     cetirizine (ZYRTEC) 10 MG tablet Take 10 mg by mouth daily as needed for allergies       levothyroxine (SYNTHROID/LEVOTHROID) 75 MCG tablet Take 1 tablet (75 mcg) by mouth daily 90 tablet 3     multivitamin, therapeutic (THERA-VIT) TABS tablet Take 1 tablet by mouth daily       VITAMIN D PO Take 2,000 Units by mouth daily        Allergies   Allergen Reactions     Augmentin Nausea and Vomiting     Nickel Rash     Recent Labs   Lab Test 21  1015 20  1223 19  1232 19  1232 10/25/18  0920 18  1028 16  0832 16  0832   A1C  --  5.4  --   --  5.5 5.9*   < >  --    LDL  --   --   --   --   --   --   --  67   HDL  --   --   --   --   --   --   --  53   TRIG  --   --   --   --   --   --   --  46   ALT 22 20  --  23  --   --    < > 17   CR 0.68 0.57  --  0.55  --   --    < > 0.57   GFRESTIMATED >90 >90  --  >90  --   --    < > >90  Non  GFR Calc     GFRESTBLACK >90 >90  --  >90  --   --    < > >90   GFR Calc     POTASSIUM 4.2 4.1  --  4.0  --   --    < > 3.5   TSH 2.11 1.41   < > 1.33 1.74 1.87   < > 1.62    < > = values in this interval not displayed.        Allergies:   Augmentin and Latex      Family History:   Mother - hypertension   Maternal grandmother - liver cancer (), gallbladder disease s/p cholecystectomy   Maternal grandfather - hypertension   Paternal grandmother - heart disease  Paternal grandfather - heart disease       Social History:   Social History     Socioeconomic History     Marital status:      Spouse name: Not on file     Number of children: 2     Years of education: Not on file     Highest education level:  "Not on file   Occupational History     Not on file   Social Needs     Financial resource strain: Not hard at all     Food insecurity     Worry: Never true     Inability: Never true     Transportation needs     Medical: No     Non-medical: No   Tobacco Use     Smoking status: Never Smoker     Smokeless tobacco: Never Used   Substance and Sexual Activity     Alcohol use: No     Drug use: No     Sexual activity: Yes     Partners: Male     Comment:    Lifestyle     Physical activity     Days per week: 0 days     Minutes per session: 0 min     Stress: To some extent   Relationships     Social connections     Talks on phone: Not on file     Gets together: Not on file     Attends Taoist service: 1 to 4 times per year     Active member of club or organization: No     Attends meetings of clubs or organizations: Not on file     Relationship status:      Intimate partner violence     Fear of current or ex partner: No     Emotionally abused: No     Physically abused: No     Forced sexual activity: No   Other Topics Concern     Parent/sibling w/ CABG, MI or angioplasty before 65F 55M? Not Asked   Social History Narrative     2009  daughter Maday son Schuyler. She lived in [place]. [place] accident when she was 6.     Home: Karen is originally from [place], and now lives in [place] with her  and two children daughter  and son.   Physical Exam:   /66 (BP Location: Right arm, Patient Position: Sitting, Cuff Size: Adult Regular)   Pulse 87   Ht 1.626 m (5' 4\")   Wt 70.3 kg (155 lb)   SpO2 100%   BMI 26.61 kg/m   LMP 6/28/2021  General:  Pleasant, alert, no acute distress multiple concerns  EYES: Eyes grossly normal to inspection, PERRL and conjunctivae and sclerae normal  HENT: ear canals and TM's normal, mouth without ulcers, oropharynx clear and oral mucous membranes moist  NECK: no adenopathy, no asymmetry, masses, or scars and thyroid normal to palpation  Lungs:  Clear to " auscultation throughout, no wheezes, rhonchi or rales.  C/V:  Regular rate and rhythm, no murmurs, rubs or gallops.  No JVD, no carotid bruits.  No peripheral edema.    BREAST: normal without masses, tenderness or nipple discharge and no palpable axillary masses or adenopathy    Abdomen: well healed laparoscopic surgical scars  Not distended.  Bowel sounds active.  No tenderness, no hepatosplenomegaly or masses.  No CVA tenderness or masses. Negative barajas's sign, liver and spleen normal.    (female):   External genitalia: Normal general appearance, hair distribution, no lesions   Urethral meatus: Normal location no lesions, or prolapse.  Urethra:No tenderness or scarring   Bladder: no fullness, masses, or tenderness   Vagina :Normal general appearance for age, appropriate estrogen effect, white physiologic discharge, no lesions, normal pelvic support,  No cystocele, or rectocele  Cervix: general appearance parous, no lesions, no concerning discharge, appears physiologic  Uterus :Normal size, contour, position, normal mobility, no tenderness, appropriate support Adnexa/parametria No masses, tenderness, organomegaly, or nodularity  Anus and perineum: normal to inspection, no rash  Lymph:  No cervical, axillary or inguinal lymph nodes.  Neuro:   Face symmetric. No tremor.  Gait steady.   M/S:  No joint deformities noted.  No joint swelling.  Skin:  No jaundice.  Normal moisture, good skin turgor.   Psych:  Broad range affect.  Multiple healh concerns Some anxiety.     PHQ 9/28/2020 7/7/2021   PHQ-9 Total Score 6 1   Q9: Thoughts of better off dead/self-harm past 2 weeks Not at all Not at all     CARLIN-7 SCORE 9/28/2020 7/7/2021   Total Score 9 0   EXAMINATION: MA DIAGNOSTIC BILATERAL W/ LEVI, 10/19/2020 11:15 AM and  CAD     History/Family History: Bilateral, multi duct, white/creamy/yellow  nipple discharge for 6 months. Family history for breast cancer in  paternal aunt diagnosed at age 59. Family history of  ovarian cancer in  paternal grandmother. The patient stopped breast-feeding in December 2019. Intermittent bilateral axillary fullness/swelling. Prolactin  level on 9/28/2020 was normal. History of Hashimoto's thyroiditis.     Comparisons: None, baseline     Findings:  Mammogram:  Technique: Standard mammographic views were performed with  tomosynthesis and 2D reconstruction.  Breast Density: Heterogeneously dense  No suspicious mammographic findings in either breast.                                                                      IMPRESSION: BI-RADS CATEGORY: 1 -  Negative.     RECOMMENDED FOLLOW-UP:  1.   Annual Mammography.   2.  Bilateral nipple discharge can be seen in the setting of treated  hypothyroidism. Clinical follow-up and management is recommended.     The finding and recommendation were discussed with the patient at the  time of evaluation.     Based on the patient history questionnaire completed prior to the  mammogram, the NCI risk calculator and the NCCN indications for  genetic screening, the patient may be at an increased risk for breast  cancer and/or have an indication for genetic screening.  A letter has  been sent to inform the patient of this and a phone number to schedule  an appointment with a Breast  if the patient so  desires.     Code: GET     The patient was given the results of the examination.     MD LOUIS MEDEIROS reviewed the above from 2020.  Results for orders placed or performed in visit on 07/07/21   HCV Screen     Status: None   Result Value Ref Range    Hepatitis C Antibody Nonreactive NR^Nonreactive   Lyme Disease Ermelinda with reflex to WB     Status: None   Result Value Ref Range    Lyme Disease Antibodies Serum 0.07 0.00 - 0.89   Comprehensive metabolic panel     Status: None   Result Value Ref Range    Sodium 136 133 - 144 mmol/L    Potassium 3.6 3.4 - 5.3 mmol/L    Chloride 106 94 - 109 mmol/L    Carbon Dioxide 27 20 - 32 mmol/L    Anion Gap 3 3 - 14  mmol/L    Glucose 97 70 - 99 mg/dL    Urea Nitrogen 8 7 - 30 mg/dL    Creatinine 0.61 0.52 - 1.04 mg/dL    GFR Estimate >90 >60 mL/min/[1.73_m2]    GFR Estimate If Black >90 >60 mL/min/[1.73_m2]    Calcium 8.5 8.5 - 10.1 mg/dL    Bilirubin Total 0.4 0.2 - 1.3 mg/dL    Albumin 3.8 3.4 - 5.0 g/dL    Protein Total 7.3 6.8 - 8.8 g/dL    Alkaline Phosphatase 45 40 - 150 U/L    ALT 18 0 - 50 U/L    AST 10 0 - 45 U/L   Lipid panel reflex to direct LDL Fasting     Status: None   Result Value Ref Range    Cholesterol 136 <200 mg/dL    Triglycerides 57 <150 mg/dL    HDL Cholesterol 54 >49 mg/dL    LDL Cholesterol Calculated 70 <100 mg/dL    Non HDL Cholesterol 82 <130 mg/dL   TSH with free T4 reflex     Status: None   Result Value Ref Range    TSH 1.76 0.40 - 4.00 mU/L   Rheumatoid factor     Status: None   Result Value Ref Range    Rheumatoid Factor <7 <12 IU/mL   CRP inflammation     Status: None   Result Value Ref Range    CRP Inflammation <2.9 0.0 - 8.0 mg/L   Results for orders placed or performed in visit on 07/07/21   Wet prep     Status: None    Specimen: Vagina   Result Value Ref Range    Specimen Description Vagina     Wet Prep No Trichomonas seen     Wet Prep No clue cells seen     Wet Prep No yeast seen     Wet Prep No PMNs seen    above results came back after visit  Assessment and Plan:  Karen was seen today for physical. She has several other concerns addressed today.  Karen Beverly is a 41 year old female with a history of endometriosis, Hashimoto's thyroiditis, and  non-functioning gallbladder s/p Laparoscopic cholecystectomy who presents for physical.   Diagnoses and all orders for this visit:    Routine history and physical examination of adult    Hashimoto's thyroiditis   She is due for monitoring of her thyroid function.    -     TSH with free T4 reflex; Future  -     Mammogram, diagnostic w [facility] (3D); Future  -     Comprehensive metabolic panel; Future  -     levothyroxine (SYNTHROID/LEVOTHROID) 75  MCG tablet; Take 1 tablet (75 mcg) by mouth daily    Lactation disorder  She continues to have a small amount of breast discharge but last year had a normal mammogram and with her condition of Hashimoto's may be related to her thyroid condition.  I ordered a mammogram to be done in October as breast exam is normal clinically.    -     Mammogram, diagnostic w [facility] (3D); Future    Multiple joint pain   She has multiple concerns today related to new joint pain shortly after having Covid vaccine.  On exam there is no signs of joint swelling.  She does work in the garden and plays outside with her children uncertain if she has been exposed to deer tick.   -     CRP inflammation; Future  -     Rheumatoid factor; Future  -     Comprehensive metabolic panel; Future  -     Lyme Disease Ermelinda with reflex to WB; Future    Vaginal discharge  She noted vaginal discharge with slight itching has been swimming recently has 1 partner and would like check for STI but it is low likelihood for sexually transmitted infection.  Clinically  discharge appears physiologic.    -     Wet prep  -     C. trachomatis PCR - Endocervical Swab, Clinic Collect  -     N. gonorrhea PCR - Endocervical Swab, Clinic Collect    Hyperlipidemia LDL goal <100   Due for screening  -     Lipid panel reflex to direct LDL Fasting; Future    Encounter for hepatitis C screening test for low risk patient  One time screening  -     HCV Screen; Future      Follow-up: Return in about 1 year earlier if concerns       Lars Burt MD

## 2021-07-07 ENCOUNTER — OFFICE VISIT (OUTPATIENT)
Dept: FAMILY MEDICINE | Facility: CLINIC | Age: 41
End: 2021-07-07
Payer: COMMERCIAL

## 2021-07-07 VITALS
DIASTOLIC BLOOD PRESSURE: 66 MMHG | BODY MASS INDEX: 26.46 KG/M2 | HEART RATE: 87 BPM | SYSTOLIC BLOOD PRESSURE: 102 MMHG | WEIGHT: 155 LBS | OXYGEN SATURATION: 100 % | HEIGHT: 64 IN

## 2021-07-07 DIAGNOSIS — Z11.59 ENCOUNTER FOR HEPATITIS C SCREENING TEST FOR LOW RISK PATIENT: ICD-10-CM

## 2021-07-07 DIAGNOSIS — E78.5 HYPERLIPIDEMIA LDL GOAL <100: ICD-10-CM

## 2021-07-07 DIAGNOSIS — M25.50 MULTIPLE JOINT PAIN: ICD-10-CM

## 2021-07-07 DIAGNOSIS — E06.3 HASHIMOTO'S THYROIDITIS: ICD-10-CM

## 2021-07-07 DIAGNOSIS — N89.8 VAGINAL DISCHARGE: ICD-10-CM

## 2021-07-07 DIAGNOSIS — O92.70 LACTATION DISORDER: ICD-10-CM

## 2021-07-07 DIAGNOSIS — Z00.00 ROUTINE HISTORY AND PHYSICAL EXAMINATION OF ADULT: Primary | ICD-10-CM

## 2021-07-07 LAB
ALBUMIN SERPL-MCNC: 3.8 G/DL (ref 3.4–5)
ALP SERPL-CCNC: 45 U/L (ref 40–150)
ALT SERPL W P-5'-P-CCNC: 18 U/L (ref 0–50)
ANION GAP SERPL CALCULATED.3IONS-SCNC: 3 MMOL/L (ref 3–14)
AST SERPL W P-5'-P-CCNC: 10 U/L (ref 0–45)
B BURGDOR IGG+IGM SER QL: 0.07 (ref 0–0.89)
BILIRUB SERPL-MCNC: 0.4 MG/DL (ref 0.2–1.3)
BUN SERPL-MCNC: 8 MG/DL (ref 7–30)
CALCIUM SERPL-MCNC: 8.5 MG/DL (ref 8.5–10.1)
CHLORIDE SERPL-SCNC: 106 MMOL/L (ref 94–109)
CHOLEST SERPL-MCNC: 136 MG/DL
CO2 SERPL-SCNC: 27 MMOL/L (ref 20–32)
CREAT SERPL-MCNC: 0.61 MG/DL (ref 0.52–1.04)
CRP SERPL-MCNC: <2.9 MG/L (ref 0–8)
GFR SERPL CREATININE-BSD FRML MDRD: >90 ML/MIN/{1.73_M2}
GLUCOSE SERPL-MCNC: 97 MG/DL (ref 70–99)
HCV AB SERPL QL IA: NONREACTIVE
HDLC SERPL-MCNC: 54 MG/DL
LDLC SERPL CALC-MCNC: 70 MG/DL
NONHDLC SERPL-MCNC: 82 MG/DL
POTASSIUM SERPL-SCNC: 3.6 MMOL/L (ref 3.4–5.3)
PROT SERPL-MCNC: 7.3 G/DL (ref 6.8–8.8)
RHEUMATOID FACT SER NEPH-ACNC: <7 IU/ML (ref 0–20)
SODIUM SERPL-SCNC: 136 MMOL/L (ref 133–144)
SPECIMEN SOURCE: NORMAL
TRIGL SERPL-MCNC: 57 MG/DL
TSH SERPL DL<=0.005 MIU/L-ACNC: 1.76 MU/L (ref 0.4–4)
WET PREP SPEC: NORMAL

## 2021-07-07 PROCEDURE — 87491 CHLMYD TRACH DNA AMP PROBE: CPT | Mod: 90 | Performed by: FAMILY MEDICINE

## 2021-07-07 PROCEDURE — 99213 OFFICE O/P EST LOW 20 MIN: CPT | Mod: 25 | Performed by: FAMILY MEDICINE

## 2021-07-07 PROCEDURE — 87210 SMEAR WET MOUNT SALINE/INK: CPT | Performed by: PATHOLOGY

## 2021-07-07 PROCEDURE — 80061 LIPID PANEL: CPT | Performed by: PATHOLOGY

## 2021-07-07 PROCEDURE — 86803 HEPATITIS C AB TEST: CPT | Mod: 90 | Performed by: PATHOLOGY

## 2021-07-07 PROCEDURE — 86140 C-REACTIVE PROTEIN: CPT | Performed by: PATHOLOGY

## 2021-07-07 PROCEDURE — 99396 PREV VISIT EST AGE 40-64: CPT | Performed by: FAMILY MEDICINE

## 2021-07-07 PROCEDURE — 80053 COMPREHEN METABOLIC PANEL: CPT | Performed by: PATHOLOGY

## 2021-07-07 PROCEDURE — 86618 LYME DISEASE ANTIBODY: CPT | Mod: 90 | Performed by: PATHOLOGY

## 2021-07-07 PROCEDURE — 87591 N.GONORRHOEAE DNA AMP PROB: CPT | Mod: 90 | Performed by: FAMILY MEDICINE

## 2021-07-07 PROCEDURE — 99000 SPECIMEN HANDLING OFFICE-LAB: CPT | Performed by: FAMILY MEDICINE

## 2021-07-07 PROCEDURE — 86431 RHEUMATOID FACTOR QUANT: CPT | Mod: 90 | Performed by: PATHOLOGY

## 2021-07-07 PROCEDURE — 84443 ASSAY THYROID STIM HORMONE: CPT | Performed by: PATHOLOGY

## 2021-07-07 PROCEDURE — 36415 COLL VENOUS BLD VENIPUNCTURE: CPT | Performed by: PATHOLOGY

## 2021-07-07 RX ORDER — LEVOTHYROXINE SODIUM 75 UG/1
75 TABLET ORAL DAILY
Qty: 90 TABLET | Refills: 3 | Status: SHIPPED | OUTPATIENT
Start: 2021-07-07 | End: 2022-07-15

## 2021-07-07 SDOH — SOCIAL STABILITY: SOCIAL NETWORK
DO YOU BELONG TO ANY CLUBS OR ORGANIZATIONS SUCH AS CHURCH GROUPS UNIONS, FRATERNAL OR ATHLETIC GROUPS, OR SCHOOL GROUPS?: NO

## 2021-07-07 SDOH — HEALTH STABILITY: PHYSICAL HEALTH: ON AVERAGE, HOW MANY DAYS PER WEEK DO YOU ENGAGE IN MODERATE TO STRENUOUS EXERCISE (LIKE A BRISK WALK)?: 0 DAYS

## 2021-07-07 SDOH — HEALTH STABILITY: PHYSICAL HEALTH: ON AVERAGE, HOW MANY MINUTES DO YOU ENGAGE IN EXERCISE AT THIS LEVEL?: 0 MIN

## 2021-07-07 SDOH — SOCIAL STABILITY: SOCIAL NETWORK: HOW OFTEN DO YOU ATTENT MEETINGS OF THE CLUB OR ORGANIZATION YOU BELONG TO?: NOT ASKED

## 2021-07-07 SDOH — HEALTH STABILITY: MENTAL HEALTH
STRESS IS WHEN SOMEONE FEELS TENSE, NERVOUS, ANXIOUS, OR CAN'T SLEEP AT NIGHT BECAUSE THEIR MIND IS TROUBLED. HOW STRESSED ARE YOU?: TO SOME EXTENT

## 2021-07-07 SDOH — SOCIAL STABILITY: SOCIAL NETWORK: HOW OFTEN DO YOU ATTEND CHURCH OR RELIGIOUS SERVICES?: 1 TO 4 TIMES PER YEAR

## 2021-07-07 ASSESSMENT — ANXIETY QUESTIONNAIRES
1. FEELING NERVOUS, ANXIOUS, OR ON EDGE: NOT AT ALL
2. NOT BEING ABLE TO STOP OR CONTROL WORRYING: NOT AT ALL
GAD7 TOTAL SCORE: 0
5. BEING SO RESTLESS THAT IT IS HARD TO SIT STILL: NOT AT ALL
3. WORRYING TOO MUCH ABOUT DIFFERENT THINGS: NOT AT ALL
6. BECOMING EASILY ANNOYED OR IRRITABLE: NOT AT ALL
7. FEELING AFRAID AS IF SOMETHING AWFUL MIGHT HAPPEN: NOT AT ALL
IF YOU CHECKED OFF ANY PROBLEMS ON THIS QUESTIONNAIRE, HOW DIFFICULT HAVE THESE PROBLEMS MADE IT FOR YOU TO DO YOUR WORK, TAKE CARE OF THINGS AT HOME, OR GET ALONG WITH OTHER PEOPLE: NOT DIFFICULT AT ALL

## 2021-07-07 ASSESSMENT — PAIN SCALES - GENERAL: PAINLEVEL: NO PAIN (0)

## 2021-07-07 ASSESSMENT — PATIENT HEALTH QUESTIONNAIRE - PHQ9
SUM OF ALL RESPONSES TO PHQ QUESTIONS 1-9: 1
5. POOR APPETITE OR OVEREATING: NOT AT ALL

## 2021-07-07 ASSESSMENT — MIFFLIN-ST. JEOR: SCORE: 1343.08

## 2021-07-07 NOTE — NURSING NOTE
Chief Complaint   Patient presents with     Physical     pt here for annual check up       Aundrea Segura CMA, EMT at 8:25 AM on 7/7/2021.    Patient presents with chest pain for the past 3 days, reports radiates down right arm

## 2021-07-08 ASSESSMENT — ANXIETY QUESTIONNAIRES: GAD7 TOTAL SCORE: 0

## 2021-10-02 ENCOUNTER — HEALTH MAINTENANCE LETTER (OUTPATIENT)
Age: 41
End: 2021-10-02

## 2021-10-13 ENCOUNTER — ANCILLARY PROCEDURE (OUTPATIENT)
Dept: MAMMOGRAPHY | Facility: CLINIC | Age: 41
End: 2021-10-13
Attending: FAMILY MEDICINE
Payer: COMMERCIAL

## 2021-10-13 DIAGNOSIS — O92.70 LACTATION DISORDER: ICD-10-CM

## 2021-10-13 DIAGNOSIS — E06.3 HASHIMOTO'S THYROIDITIS: ICD-10-CM

## 2021-10-13 PROCEDURE — 77066 DX MAMMO INCL CAD BI: CPT

## 2021-10-13 PROCEDURE — G0279 TOMOSYNTHESIS, MAMMO: HCPCS

## 2021-10-13 NOTE — RESULT ENCOUNTER NOTE
Dear Karen Pinedak   Congratulations. The result of your recent mammogram was normal.  Best wishes,  Lars Burt MD

## 2021-10-13 NOTE — LETTER
Karen Kaplansimranprince  [address] [place] [address]              October 13, 2021  Date of Exam:     Dear Karen:    Thank you for your recent visit.  Breast Imaging Result: We are pleased to inform you that the results of your recent breast imaging show no evidence of malignancy (cancer).    If you are experiencing any breast problems such as a lump or localized pain we request that you discuss this with your health care team if you haven t already done so, as additional testing may be necessary.    As you know, early detection of cancer is very important. Although not all cancers are found through breast imaging, it is the most accurate method for early detection. A thorough examination includes a combination of breast imaging, physical examination and breast self-examination. Currently the American College of Radiology and the Society of Breast Imaging recommend breast imaging beginning at the age of 40.    A report of your breast imaging results was sent to: Lars Burt    Your breast imaging will become part of your medical file here at [facility] for at least 10 years. You are responsible for informing any new health care team or breast imaging facility of the date and location of this examination.    We appreciate the opportunity to participate in your health care.    Sincerely,  Dr. Hansa BARTLETT [facility]

## 2021-11-30 ENCOUNTER — MYC MEDICAL ADVICE (OUTPATIENT)
Dept: SURGERY | Facility: CLINIC | Age: 41
End: 2021-11-30
Payer: COMMERCIAL

## 2021-12-01 ENCOUNTER — OFFICE VISIT (OUTPATIENT)
Dept: SURGERY | Facility: CLINIC | Age: 41
End: 2021-12-01
Payer: COMMERCIAL

## 2021-12-01 VITALS — WEIGHT: 158.3 LBS | OXYGEN SATURATION: 100 % | HEIGHT: 64 IN | HEART RATE: 83 BPM | BODY MASS INDEX: 27.02 KG/M2

## 2021-12-01 DIAGNOSIS — L29.0 PRURITUS ANI: ICD-10-CM

## 2021-12-01 DIAGNOSIS — K64.8 HEMORRHOID PROLAPSE: Primary | ICD-10-CM

## 2021-12-01 PROCEDURE — 46221 LIGATION OF HEMORRHOID(S): CPT | Performed by: NURSE PRACTITIONER

## 2021-12-01 PROCEDURE — 99213 OFFICE O/P EST LOW 20 MIN: CPT | Mod: 25 | Performed by: NURSE PRACTITIONER

## 2021-12-01 ASSESSMENT — MIFFLIN-ST. JEOR: SCORE: 1358.04

## 2021-12-01 ASSESSMENT — PAIN SCALES - GENERAL: PAINLEVEL: NO PAIN (0)

## 2021-12-01 NOTE — NURSING NOTE
"Chief Complaint   Patient presents with     RECHECK     follow up hemorrhoids and itching       Vitals:    12/01/21 1110   Pulse: 83   SpO2: 100%   Weight: 158 lb 4.8 oz   Height: 5' 4\"       Body mass index is 27.17 kg/m .                          Patricia España, EMT    "

## 2021-12-01 NOTE — LETTER
"2021       RE: Karen Beverly  [address]  [place] [address]     Dear Colleague,    Thank you for referring your patient, Karen Beverly, to the [facility] [place] at [facility]. Please see a copy of my visit note below.    Colon and Rectal Surgery Follow-Up Clinic Note    RE: Karen Beverly  : 1980  WALTER: 2021    Karen Beverly is a very pleasant 41 year old female here for follow up of hemorrhoids and pruritus.    Interval history: I saw Karen in  with hemorrhoid banding and pruritus ani.  She had been doing well but again given the summer she developed return of perianal itching and prolapsing tissue again.  No bleeding.  Some stinging with harder bowel movements.  She has been using calmoseptine and eating more fiber but is no longer on a fiber supplement.  She had a normal colonoscopy in .    Physical Examination: Exam was chaperoned by Patricia Dye, EMT  Pulse 83   Ht 5' 4\"   Wt 158 lb 4.8 oz   SpO2 100%   BMI 27.17 kg/m    General: Alert, oriented, in no acute distress, sitting comfortably  HEENT: Mucous membranes moist  Perianal external examination:  Perianal skin: Some mild excoriation in the anterior position  Lesions: No evidence of an external lesion, nodularity, or induration in the perianal region.  Eversion of buttocks: There was not evidence of an anal fissure. Details: N/A.  Skin tags or external hemorrhoids: Yes: skin tag in the anterior midline with some prolapsing mucosa present.    Digital rectal examination: Was performed.   Sphincter tone: Good.  Palpable lesions: No.  Other: None.  Bimanual examination: was not performed.    Anoscopy: Was performed.   Hemorrhoids: Yes. Small prolapsing internal hemorrhoids  Lesions: No.    Procedures:  After discussing the risks and benefits, the patient agreed to proceed with internal hemorrhoidal banding.    Prior to the start " of the procedure and with procedural staff participation, I verbally confirmed the patient s identity using two indicators, relevant allergies, that the procedure was appropriate and matched the consent or emergent situation, and that the correct equipment/implants were available. Immediately prior to starting the procedure I conducted the Time Out with the procedural staff and re-confirmed the patient s name, procedure, and site/side. (The Joint Commission universal protocol was followed.)  Yes    Sedation (Moderate or Deep): None    A suction hemorrhoidal  was used to place a total of 1 band(s) in the posterior position(s).    There was no significant bleeding. The patient tolerated the procedure well.    This procedure was performed under a collaborative privileging agreement with Dr. Irizarry, Chief of Colon and Rectal Surgery.      Assessment/Plan: 41 year old female with hemorrhoid prolapse.  I again discussed that she likely needs to be on a daily fiber supplement both for the itching and for the hemorrhoids.  Recommended Citrucel.  She can use, septin cream to the perianal skin as needed.  Discussed repeat hemorrhoid banding for prolapsing tissue as she tolerated this well previously.  She tolerated banding well again today.  Asked her to remain off of blood thinners and avoid any heavy lifting for 2 weeks.  We will have her return to clinic anytime after 1 month for any continued symptoms. Patient's questions were answered to her stated satisfaction and she is in agreement with this plan.      Medical history:  Past Medical History:   Diagnosis Date     Eczema      Endometriosis       (normal spontaneous vaginal delivery)     x 2     Thyroid disease        Surgical history:  Past Surgical History:   Procedure Laterality Date     DAVINCI LAPAROSCOPIC CHOLECYSTECTOMY WITHOUT GRAMS N/A 2021    Procedure: CHOLECYSTECTOMY, ROBOT-ASSISTED, LAPAROSCOPIC;  Surgeon: Olman Goss MD;  Location:  [facility] OR     DAVINCI PELVIC PROCEDURE  2/22/2012    Procedure:DAVINCI PELVIC PROCEDURE; DAVINCI DIAGNOSTIC PELVISCOPY WITH OMNIGUIDE C02 LASER, DIAGNOSTIC HYSTEROSCOPY, EXTENSIVE LYSIS EXCISION OF ENDOMETRIOSIS, BILATERAL URETEROLYSIS, D & C; Surgeon:JOHN KELLY; Location: OR       Problem list:    Patient Active Problem List    Diagnosis Date Noted     Biliary colic 01/18/2021     Priority: Medium     Added automatically from request for surgery [id number]       Eczema, unspecified type 02/18/2019     Priority: Medium     Pruritic disorder 02/18/2019     Priority: Medium     Non-functioning gallbladder 12/23/2018     Priority: Medium     Gastroesophageal reflux disease without esophagitis 12/13/2017     Priority: Medium     Rhinoconjunctivitis 10/02/2017     Priority: Medium     Chronic sinusitis, unspecified location 10/02/2017     Priority: Medium     Indication for care in labor or delivery 03/14/2017     Priority: Medium     Labor and delivery, indication for care 03/14/2017     Priority: Medium     Hashimoto's thyroiditis 12/24/2016     Priority: Medium     History of multiple miscarriages 03/01/2016     Priority: Medium     Thyroid disease, antepartum 09/19/2013     Priority: Medium     Elevated TSH 09/08/2013     Priority: Medium       Medications:  Current Outpatient Medications   Medication Sig Dispense Refill     cetirizine (ZYRTEC) 10 MG tablet Take 10 mg by mouth daily as needed for allergies       levothyroxine (SYNTHROID/LEVOTHROID) 75 MCG tablet Take 1 tablet (75 mcg) by mouth daily 90 tablet 3     multivitamin, therapeutic (THERA-VIT) TABS tablet Take 1 tablet by mouth daily       VITAMIN D PO Take 2,000 Units by mouth daily          Allergies:  Allergies   Allergen Reactions     Augmentin Nausea and Vomiting     Nickel Rash       Family history:  Family History   Problem Relation Age of Onset     Hypertension Mother      Cancer Other         liver cancer     Liver Cancer Maternal Grandmother  "79     Hypertension Maternal Grandfather      Heart Disease Paternal Grandmother 62     Ovarian Cancer Paternal Grandmother 92     Heart Disease Paternal Grandfather 60     Breast Cancer Paternal Aunt 59             Melanoma No family hx of      Skin Cancer No family hx of        Social history:  Social History     Tobacco Use     Smoking status: Never Smoker     Smokeless tobacco: Never Used   Substance Use Topics     Alcohol use: No     Marital status: .    Nursing Notes:   Patricia España, EMT  2021 11:13 AM  Signed  Chief Complaint   Patient presents with     RECHECK     follow up hemorrhoids and itching       Vitals:    21 1110   Pulse: 83   SpO2: 100%   Weight: 158 lb 4.8 oz   Height: 5' 4\"       Body mass index is 27.17 kg/m .                          Patricia España, EMT        20 minutes spent on the date of the encounter doing chart review, history and exam, documentation and further activities as noted above.     KASSANDRA TadeoC  Colon and Rectal Surgery  [facility]        Again, thank you for allowing me to participate in the care of your patient.      Sincerely,    Corinna Prado, APRN CNP      "

## 2021-12-01 NOTE — PROGRESS NOTES
"Colon and Rectal Surgery Follow-Up Clinic Note    RE: Karen Beverly  : 1980  WALTER: 2021    Karen Beverly is a very pleasant 41 year old female here for follow up of hemorrhoids and pruritus.    Interval history: I saw Karen in 2019 with hemorrhoid banding and pruritus ani.  She had been doing well but again given the summer she developed return of perianal itching and prolapsing tissue again.  No bleeding.  Some stinging with harder bowel movements.  She has been using calmoseptine and eating more fiber but is no longer on a fiber supplement.  She had a normal colonoscopy in .    Physical Examination: Exam was chaperoned by Patricia Dye, EMT  Pulse 83   Ht 5' 4\"   Wt 158 lb 4.8 oz   SpO2 100%   BMI 27.17 kg/m    General: Alert, oriented, in no acute distress, sitting comfortably  HEENT: Mucous membranes moist  Perianal external examination:  Perianal skin: Some mild excoriation in the anterior position  Lesions: No evidence of an external lesion, nodularity, or induration in the perianal region.  Eversion of buttocks: There was not evidence of an anal fissure. Details: N/A.  Skin tags or external hemorrhoids: Yes: skin tag in the anterior midline with some prolapsing mucosa present.    Digital rectal examination: Was performed.   Sphincter tone: Good.  Palpable lesions: No.  Other: None.  Bimanual examination: was not performed.    Anoscopy: Was performed.   Hemorrhoids: Yes. Small prolapsing internal hemorrhoids  Lesions: No.    Procedures:  After discussing the risks and benefits, the patient agreed to proceed with internal hemorrhoidal banding.    Prior to the start of the procedure and with procedural staff participation, I verbally confirmed the patient s identity using two indicators, relevant allergies, that the procedure was appropriate and matched the consent or emergent situation, and that the correct equipment/implants were available. Immediately prior to starting the procedure " I conducted the Time Out with the procedural staff and re-confirmed the patient s name, procedure, and site/side. (The Joint Commission universal protocol was followed.)  Yes    Sedation (Moderate or Deep): None    A suction hemorrhoidal  was used to place a total of 1 band(s) in the posterior position(s).    There was no significant bleeding. The patient tolerated the procedure well.    This procedure was performed under a collaborative privileging agreement with Dr. Irizarry, Chief of Colon and Rectal Surgery.      Assessment/Plan: 41 year old female with hemorrhoid prolapse.  I again discussed that she likely needs to be on a daily fiber supplement both for the itching and for the hemorrhoids.  Recommended Citrucel.  She can use, septin cream to the perianal skin as needed.  Discussed repeat hemorrhoid banding for prolapsing tissue as she tolerated this well previously.  She tolerated banding well again today.  Asked her to remain off of blood thinners and avoid any heavy lifting for 2 weeks.  We will have her return to clinic anytime after 1 month for any continued symptoms. Patient's questions were answered to her stated satisfaction and she is in agreement with this plan.      Medical history:  Past Medical History:   Diagnosis Date     Eczema      Endometriosis       (normal spontaneous vaginal delivery)     x 2     Thyroid disease        Surgical history:  Past Surgical History:   Procedure Laterality Date     DAVINCI LAPAROSCOPIC CHOLECYSTECTOMY WITHOUT GRAMS N/A 2021    Procedure: CHOLECYSTECTOMY, ROBOT-ASSISTED, LAPAROSCOPIC;  Surgeon: Olman Goss MD;  Location:  OR     DAVINCI PELVIC PROCEDURE  2012    Procedure:DAVINCI PELVIC PROCEDURE; DAVINCI DIAGNOSTIC PELVISCOPY WITH OMNIGUIDE C02 LASER, DIAGNOSTIC HYSTEROSCOPY, EXTENSIVE LYSIS EXCISION OF ENDOMETRIOSIS, BILATERAL URETEROLYSIS, D & C; Surgeon:JOHN KELLY; Location: OR       Problem list:    Patient Active  Problem List    Diagnosis Date Noted     Biliary colic 2021     Priority: Medium     Added automatically from request for surgery [id number]       Eczema, unspecified type 2019     Priority: Medium     Pruritic disorder 2019     Priority: Medium     Non-functioning gallbladder 2018     Priority: Medium     Gastroesophageal reflux disease without esophagitis 2017     Priority: Medium     Rhinoconjunctivitis 10/02/2017     Priority: Medium     Chronic sinusitis, unspecified location 10/02/2017     Priority: Medium     Indication for care in labor or delivery 2017     Priority: Medium     Labor and delivery, indication for care 2017     Priority: Medium     Hashimoto's thyroiditis 2016     Priority: Medium     History of multiple miscarriages 2016     Priority: Medium     Thyroid disease, antepartum 2013     Priority: Medium     Elevated TSH 2013     Priority: Medium       Medications:  Current Outpatient Medications   Medication Sig Dispense Refill     cetirizine (ZYRTEC) 10 MG tablet Take 10 mg by mouth daily as needed for allergies       levothyroxine (SYNTHROID/LEVOTHROID) 75 MCG tablet Take 1 tablet (75 mcg) by mouth daily 90 tablet 3     multivitamin, therapeutic (THERA-VIT) TABS tablet Take 1 tablet by mouth daily       VITAMIN D PO Take 2,000 Units by mouth daily          Allergies:  Allergies   Allergen Reactions     Augmentin Nausea and Vomiting     Nickel Rash       Family history:  Family History   Problem Relation Age of Onset     Hypertension Mother      Cancer Other         liver cancer     Liver Cancer Maternal Grandmother 79     Hypertension Maternal Grandfather      Heart Disease Paternal Grandmother 62     Ovarian Cancer Paternal Grandmother 92     Heart Disease Paternal Grandfather 60     Breast Cancer Paternal Aunt 59             Melanoma No family hx of      Skin Cancer No family hx of        Social history:  Social History  "    Tobacco Use     Smoking status: Never Smoker     Smokeless tobacco: Never Used   Substance Use Topics     Alcohol use: No     Marital status: .    Nursing Notes:   Patricia España, EMT  12/1/2021 11:13 AM  Signed  Chief Complaint   Patient presents with     RECHECK     follow up hemorrhoids and itching       Vitals:    12/01/21 1110   Pulse: 83   SpO2: 100%   Weight: 158 lb 4.8 oz   Height: 5' 4\"       Body mass index is 27.17 kg/m .                          Patricia España, DEMETRIO        20 minutes spent on the date of the encounter doing chart review, history and exam, documentation and further activities as noted above.     Corinna Melendez NP-C  Colon and Rectal Surgery  [facility]    "

## 2022-07-13 DIAGNOSIS — E06.3 HASHIMOTO'S THYROIDITIS: ICD-10-CM

## 2022-07-15 ENCOUNTER — MYC REFILL (OUTPATIENT)
Dept: FAMILY MEDICINE | Facility: CLINIC | Age: 42
End: 2022-07-15

## 2022-07-15 DIAGNOSIS — E06.3 HASHIMOTO'S THYROIDITIS: Primary | ICD-10-CM

## 2022-07-15 DIAGNOSIS — E06.3 HASHIMOTO'S THYROIDITIS: ICD-10-CM

## 2022-07-15 RX ORDER — LEVOTHYROXINE SODIUM 75 UG/1
75 TABLET ORAL DAILY
Qty: 90 TABLET | Refills: 0 | Status: CANCELLED | OUTPATIENT
Start: 2022-07-15

## 2022-07-15 RX ORDER — LEVOTHYROXINE SODIUM 75 UG/1
75 TABLET ORAL DAILY
Qty: 90 TABLET | Refills: 0 | Status: SHIPPED | OUTPATIENT
Start: 2022-07-15 | End: 2022-09-19

## 2022-07-15 NOTE — TELEPHONE ENCOUNTER
Last Clinic Visit: 7/7/2021  [facility]       Lab Test 07/07/21  0955   TSH 1.76      Scheduled for follow-up 9/12/22    90 day ethna refill sent to the pharmacy -   FYI sent to the clinic to order TSH at next visit

## 2022-09-03 ENCOUNTER — HEALTH MAINTENANCE LETTER (OUTPATIENT)
Age: 42
End: 2022-09-03

## 2022-09-12 ASSESSMENT — ENCOUNTER SYMPTOMS
BACK PAIN: 1
TINGLING: 1
NECK PAIN: 1
BREAST PAIN: 1

## 2022-09-18 NOTE — PROGRESS NOTES
[facility]  Primary Care Center   Lars Burt MD  9/19/22     Chief Complaint:   Physical     History of Present Illness:   Karen Beverly is a 42 year old  with a history of endometriosis, Hashimoto's thyroiditis, and non-functioning gallbladder s/p laparoscopic cholecystectomy who presents for physical.  She has several other concerns and was advised on split billing.     Cholecystectomy  She completed laparoscopic cholecystectomy. Liver hemangioma has been stable throughout imaging. Occasional GERD relieved with tums OK to use pepcid if needed. Stomach has been bloating.  She is uncertain which foods seem to be contributing to abdominal bloating tries to lower fat and sugar in her diet  Neck pain  Neck pain on right.  She has noted stiffness and pain on the right side of the posterior neck near the trapezius.  She has not been lifting heavily denies any numbness down her arms.  Mood   Stable at this time.  Worried about her family when they are ill.  Seasonal allergies  She has seasonal allergy symptoms that are present from May through winter freeze.  She has used Zyrtec with some improvement. She utilized Flonase installs the spray with the opposite hand to avoid trauma to the nasal septum.    Eczema  Her eczema is currently stable.      SH: Family had Covid end of September 2021 she did not get COVID.  Healthcare Maintenance   Pap (females age 21 - 65): every 3 years, every 5 years after age 35 if cotesting done -   PAP 2/2019 NIL hpv neg next in 2 /2024 history of one slightly abnormal pap in past 2/1/2016 ASCUS  Mammogram 10/13/2021 still has breast discharge at times willing to have mammography.  Glucose: every 5 years, yearly if risk factors? - recommend  Lipid panel: every 5 years, yearly if risk factors - up to date  HIV (ages 13 - 64): once per lifetime, yearly if MSM or other risk factors - Up to Date 2012  Immunizations (Tetanus every 10 years, Influenza yearly, Pneumonia PPV-23 and PCV-13 age  ? 65 or sooner if risk factors) - yearly influenza.  Immunization History   Administered Date(s) Administered     TDAP Vaccine (Boostrix) 01/05/2017     Walking every day 5 miles.  Review of Systems:   Pertinent items are noted in HPI and below, remainder of complete ROS is negative.    Problem list, PMH, Surgical HX, FH, SH, allergies, medications,immunizations reviewed and updated in Epic.  ROS negative other than noted in HPI and ROS.    Answers for HPI/ROS submitted by the patient on 9/12/2022  General Symptoms: No  Skin Symptoms: No  HENT Symptoms: No  EYE SYMPTOMS: No  HEART SYMPTOMS: No  LUNG SYMPTOMS: No  INTESTINAL SYMPTOMS: No  URINARY SYMPTOMS: No  GYNECOLOGIC SYMPTOMS: No  BREAST SYMPTOMS: Yes discharge  SKELETAL SYMPTOMS: Yes  BLOOD SYMPTOMS: No  NERVOUS SYSTEM SYMPTOMS: Yes  MENTAL HEALTH SYMPTOMS: No  Discharge: Yes  Pain: Yes  Nipple retraction: No  Back pain: Yes neck on right  Neck pain: Yesneck on right  Tingling: Yes occasional legs        Labs reviewed in EPIC  BP Readings from Last 3 Encounters:   09/19/22 103/66   07/07/21 102/66   02/05/21 98/59    Wt Readings from Last 3 Encounters:   09/19/22 72.6 kg (160 lb)   12/01/21 71.8 kg (158 lb 4.8 oz)   07/07/21 70.3 kg (155 lb)               Immunization History   Administered Date(s) Administered     COVID-19,PF,Moderna 04/27/2021, 05/24/2021, 12/12/2021     TDAP Vaccine (Boostrix) 01/05/2017     Tdap (Adacel,Boostrix) 01/05/2017        Patient Active Problem List   Diagnosis     Elevated TSH     Thyroid disease, antepartum     History of multiple miscarriages     Hashimoto's thyroiditis     Indication for care in labor or delivery     Labor and delivery, indication for care     Rhinoconjunctivitis     Chronic sinusitis, unspecified location     Gastroesophageal reflux disease without esophagitis     Non-functioning gallbladder     Eczema, unspecified type     Pruritic disorder     Biliary colic     Past Surgical History:   Procedure Laterality  Date     DAVINCI LAPAROSCOPIC CHOLECYSTECTOMY WITHOUT GRAMS N/A 2021    Procedure: CHOLECYSTECTOMY, ROBOT-ASSISTED, LAPAROSCOPIC;  Surgeon: Olman Goss MD;  Location:  OR     DAVINCI PELVIC PROCEDURE  2012    Procedure:DAVINCI PELVIC PROCEDURE; DAVINCI DIAGNOSTIC PELVISCOPY WITH OMNIGUIDE C02 LASER, DIAGNOSTIC HYSTEROSCOPY, EXTENSIVE LYSIS EXCISION OF ENDOMETRIOSIS, BILATERAL URETEROLYSIS, D & C; Surgeon:JOHN KELLY; Location: OR       Social History     Tobacco Use     Smoking status: Never Smoker     Smokeless tobacco: Never Used   Substance Use Topics     Alcohol use: No     Family History   Problem Relation Age of Onset     Hypertension Mother      Cancer Other         liver cancer     Liver Cancer Maternal Grandmother 79     Hypertension Maternal Grandfather      Heart Disease Paternal Grandmother 62     Ovarian Cancer Paternal Grandmother 92     Heart Disease Paternal Grandfather 60     Breast Cancer Paternal Aunt 59             Melanoma No family hx of      Skin Cancer No family hx of          Current Outpatient Medications   Medication Sig Dispense Refill     cetirizine (ZYRTEC) 10 MG tablet Take 10 mg by mouth daily as needed for allergies       levothyroxine (SYNTHROID/LEVOTHROID) 75 MCG tablet Take 1 tablet (75 mcg) by mouth daily 90 tablet 3     multivitamin, therapeutic (THERA-VIT) TABS tablet Take 1 tablet by mouth daily       VITAMIN D PO Take 2,000 Units by mouth daily        Allergies   Allergen Reactions     Augmentin Nausea and Vomiting     Nickel Rash     Recent Labs   Lab Test 22  1028 21  0955 21  1015 20  1223 19  1232 10/25/18  0920 18  1028 16  1027 16  0832   A1C  --   --   --  5.4  --  5.5 5.9*   < >  --    LDL  --  70  --   --   --   --   --   --  67   HDL  --  54  --   --   --   --   --   --  53   TRIG  --  57  --   --   --   --   --   --  46   ALT  --     < >  --   --    < > 17   CR  --  0.61  0.68 0.57   < >  --   --    < > 0.57   GFRESTIMATED  --  >90 >90 >90   < >  --   --    < > >90  Non  GFR Calc     GFRESTBLACK  --  >90 >90 >90   < >  --   --    < > >90   GFR Calc     POTASSIUM  --  3.6 4.2 4.1   < >  --   --    < > 3.5   TSH 2.58 1.76 2.11 1.41   < > 1.74 1.87   < > 1.62    < > = values in this interval not displayed.         Social History:   Social History     Socioeconomic History     Marital status:      Spouse name: Not on file     Number of children: 2     Years of education: Not on file     Highest education level: Not on file   Occupational History     Not on file   Tobacco Use     Smoking status: Never Smoker     Smokeless tobacco: Never Used   Vaping Use     Vaping Use: Never used   Substance and Sexual Activity     Alcohol use: No     Drug use: No     Sexual activity: Yes     Partners: Male     Comment:    Other Topics Concern     Parent/sibling w/ CABG, MI or angioplasty before 65F 55M? Not Asked   Social History Narrative     2009  daughter Maday son Schuyler. She lived in [place]. [place] accident when she was 6.     Social Determinants of Health     Financial Resource Strain: Low Risk      Difficulty of Paying Living Expenses: Not hard at all   Food Insecurity: No Food Insecurity     Worried About Running Out of Food in the Last Year: Never true     Ran Out of Food in the Last Year: Never true   Transportation Needs: No Transportation Needs     Lack of Transportation (Medical): No     Lack of Transportation (Non-Medical): No   Physical Activity: Not on file   Stress: Not on file   Social Connections: Not on file   Intimate Partner Violence: Not At Risk     Fear of Current or Ex-Partner: No     Emotionally Abused: No     Physically Abused: No     Sexually Abused: No   Housing Stability: Not on file     Home: Karen is originally from [place], and now lives in [place] with her  and two children daughter  and son.  "  Physical Exam:   /66 (BP Location: Right arm, Patient Position: Sitting, Cuff Size: Adult Regular)   Pulse 72   Temp 98.6  F (37  C) (Oral)   Resp 16   Ht 1.626 m (5' 4\")   Wt 72.6 kg (160 lb)   SpO2 99%   BMI 27.46 kg/m   LMP 6/28/2021  General:  Pleasant, alert, no acute distress multiple concerns  EYES: Eyes grossly normal to inspection, PERRL and conjunctivae and sclerae normal  HENT: ear canals and TM's normal, mask removed briefly mouth without ulcers, oropharynx clear and oral mucous membranes moist  NECK: no adenopathy, no asymmetry, masses, or scars and thyroid normal to palpation  Lungs:  Clear to auscultation throughout, no wheezes, rhonchi or rales.  C/V:  Regular rate and rhythm, no murmurs, rubs or gallops.  No JVD, no carotid bruits.  No peripheral edema.    BREAST: normal without masses, tenderness or nipple discharge and no palpable axillary masses or adenopathy    Abdomen: well healed laparoscopic surgical scars  Not distended.  Bowel sounds active.  No tenderness, no hepatosplenomegaly or masses.  No CVA tenderness or masses. Negative barajas's sign, liver and spleen normal.   Lymph:  No cervical, no axillary lymph nodes.  Neuro:   Face symmetric. No tremor.  Gait steady.  DTRs 2+ upper and lower bilaterally symmetrical see musculoskeletal for strength.  M/S: Neck full extension flexion tightness when looking to the right.  Muscle tightness on palpation on the right upper trapezius.  Full strength of upper body, full range of motion shoulders, elbows and wrists. No joint deformities noted.  No joint swelling.  Skin:  No jaundice.  Normal moisture, good skin turgor.   Psych:  Broad range affect.  Multiple healh concerns Some anxiety but improved  I clarified PHQ 2 score  PHQ-2 Score:     PHQ-2 ( 1999 Pfizer) 9/19/2022 9/19/2022   Q1: Little interest or pleasure in doing things 0 (No Data)   Q2: Feeling down, depressed or hopeless 0 0   PHQ-2 Score 0 -   PHQ-2 Total Score (12-17 " Years)- Positive if 3 or more points; Administer PHQ-A if positive - -   Q1: Little interest or pleasure in doing things - -   Q2: Feeling down, depressed or hopeless - -   PHQ-2 Score - -       PHQ 9/28/2020 7/7/2021   PHQ-9 Total Score 6 1   Q9: Thoughts of better off dead/self-harm past 2 weeks Not at all Not at all     CARLIN-7 SCORE 9/28/2020 7/7/2021   Total Score 9 0      Results for orders placed or performed in visit on 09/19/22   TSH with free T4 reflex     Status: Normal   Result Value Ref Range    TSH 2.58 0.30 - 4.20 uIU/mL        Assessment and Plan:  Karen was seen today for physical. She has several other concerns addressed today.  Related to neck pain, abdominal bloating and requires thyroid function testing for monitoring of her chronic hypothyroid condition.  Karen Beverly is a 41 year old female with a history of endometriosis, Hashimoto's thyroiditis, and  non-functioning gallbladder s/p Laparoscopic cholecystectomy who presents for physical.   Karen was seen today for physical.    Diagnoses and all orders for this visit:    Encounter for preventive care  I encouraged annual flu vaccine and updated COVID bi valent booster.  She wanted to hold off on that at this time and will obtain through local pharmacy when she can bring her children for vaccine as well.  Neck pain  -     Physical Therapy Referral; Future    Visit for screening mammogram  -     Mammogram, screening w [facility] (3D); Future    Abdominal bloating  Today I reviewed monitoring of abdominal bloating decrease in sugary, fatty food or drinks such as carbonated beverages.  I suggested lowering gluten in her diet and paying attention to lactose if precipitating factor.  Strain of neck muscle, subsequent encounter  She had pain on the right side of her neck would benefit from physical therapy to assist with neck range of motion.  Upper body strengthening.  Comments:  right    Orders:  -     Physical Therapy Referral; Future    Hashimoto's  thyroiditis  Thyroid results came back after visit in range continue current dose of levothyroxine annual fill provided.  -     levothyroxine (SYNTHROID/LEVOTHROID) 75 MCG tablet; Take 1 tablet (75 mcg) by mouth daily      Additional 15 minutes spent on the date of the encounter evaluation and management,  doing chart review, history, exam, diagnostics review, documentation, counseling and coordination of cares as noted exclusive of preventive cares.    Follow-up: Return in about 1 year earlier if concerns       Lars Burt MD

## 2022-09-19 ENCOUNTER — LAB (OUTPATIENT)
Dept: LAB | Facility: CLINIC | Age: 42
End: 2022-09-19
Payer: COMMERCIAL

## 2022-09-19 ENCOUNTER — OFFICE VISIT (OUTPATIENT)
Dept: FAMILY MEDICINE | Facility: CLINIC | Age: 42
End: 2022-09-19
Payer: COMMERCIAL

## 2022-09-19 VITALS
OXYGEN SATURATION: 99 % | WEIGHT: 160 LBS | BODY MASS INDEX: 27.31 KG/M2 | HEIGHT: 64 IN | RESPIRATION RATE: 16 BRPM | DIASTOLIC BLOOD PRESSURE: 66 MMHG | TEMPERATURE: 98.6 F | SYSTOLIC BLOOD PRESSURE: 103 MMHG | HEART RATE: 72 BPM

## 2022-09-19 DIAGNOSIS — E06.3 HASHIMOTO'S THYROIDITIS: ICD-10-CM

## 2022-09-19 DIAGNOSIS — R14.0 ABDOMINAL BLOATING: ICD-10-CM

## 2022-09-19 DIAGNOSIS — M54.2 NECK PAIN: ICD-10-CM

## 2022-09-19 DIAGNOSIS — S16.1XXD STRAIN OF NECK MUSCLE, SUBSEQUENT ENCOUNTER: ICD-10-CM

## 2022-09-19 DIAGNOSIS — Z00.00 ENCOUNTER FOR PREVENTIVE CARE: Primary | ICD-10-CM

## 2022-09-19 DIAGNOSIS — Z12.31 VISIT FOR SCREENING MAMMOGRAM: ICD-10-CM

## 2022-09-19 LAB — TSH SERPL DL<=0.005 MIU/L-ACNC: 2.58 UIU/ML (ref 0.3–4.2)

## 2022-09-19 PROCEDURE — 99396 PREV VISIT EST AGE 40-64: CPT | Performed by: FAMILY MEDICINE

## 2022-09-19 PROCEDURE — 99000 SPECIMEN HANDLING OFFICE-LAB: CPT | Performed by: PATHOLOGY

## 2022-09-19 PROCEDURE — 36415 COLL VENOUS BLD VENIPUNCTURE: CPT | Performed by: PATHOLOGY

## 2022-09-19 PROCEDURE — 84443 ASSAY THYROID STIM HORMONE: CPT | Mod: 90 | Performed by: PATHOLOGY

## 2022-09-19 PROCEDURE — 99213 OFFICE O/P EST LOW 20 MIN: CPT | Mod: 25 | Performed by: FAMILY MEDICINE

## 2022-09-19 RX ORDER — LEVOTHYROXINE SODIUM 75 UG/1
75 TABLET ORAL DAILY
Qty: 90 TABLET | Refills: 3 | Status: SHIPPED | OUTPATIENT
Start: 2022-09-19 | End: 2023-08-02

## 2022-09-19 NOTE — NURSING NOTE
Karen Beverly is a 42 year old female that presents in clinic today for the following:     Chief Complaint   Patient presents with     Physical       The patient's allergies and medications were reviewed. The patient's vitals were obtained without incident. The patient does not have any other questions for the provider.     Juliano Trotter, EMT at 10:52 AM on 9/19/2022.  Primary Care Clinic: [phone]

## 2022-09-19 NOTE — PATIENT INSTRUCTIONS
Think about the covid bivalent booster through local pharmacy   Annual influenza vaccine in the fall.    To schedule a mammogram, please call radiology scheduling at [phone].

## 2022-09-20 NOTE — RESULT ENCOUNTER NOTE
Dear Karen Pinedak    Your thyroid is in range on current dose of levothyroxine 75 mcg continue this dose.  Best wishes,  Lars Burt MD

## 2022-09-22 ENCOUNTER — TELEPHONE (OUTPATIENT)
Dept: FAMILY MEDICINE | Facility: CLINIC | Age: 42
End: 2022-09-22

## 2022-09-22 ENCOUNTER — THERAPY VISIT (OUTPATIENT)
Dept: PHYSICAL THERAPY | Facility: CLINIC | Age: 42
End: 2022-09-22
Attending: FAMILY MEDICINE
Payer: COMMERCIAL

## 2022-09-22 DIAGNOSIS — S16.1XXD STRAIN OF NECK MUSCLE, SUBSEQUENT ENCOUNTER: ICD-10-CM

## 2022-09-22 DIAGNOSIS — M54.2 NECK PAIN: ICD-10-CM

## 2022-09-22 PROCEDURE — 97161 PT EVAL LOW COMPLEX 20 MIN: CPT | Mod: GP | Performed by: PHYSICAL THERAPIST

## 2022-09-22 PROCEDURE — 97140 MANUAL THERAPY 1/> REGIONS: CPT | Mod: GP | Performed by: PHYSICAL THERAPIST

## 2022-09-22 PROCEDURE — 97110 THERAPEUTIC EXERCISES: CPT | Mod: GP | Performed by: PHYSICAL THERAPIST

## 2022-09-22 NOTE — TELEPHONE ENCOUNTER
Spoke to the patient. Per patient states spoke to one of the clinic coordinator earlier. Patient just need mammogram. Phone number given to schedule mammogram order placed by PCP at last visit 09/19/22.    Aviva Perez, Clinic , 09/22/22

## 2022-09-22 NOTE — PROGRESS NOTES
Physical Therapy Initial Evaluation  Subjective:  The history is provided by the patient. No  was used.   Patient Health History  Karen Beverly being seen for Neck and shoulder pain..     Problem began: 6/1/2022.   Problem occurred: insideous onset. pt is wondering if this was caused by repetitive lifting at first job 10 years ago or from lifting her child.   Pain is reported as 5/10 on pain scale.  General health as reported by patient is good.  Health conditions: per chart review, thyroid, mental illness.     Medical allergies: none.   Surgeries include:  Other. Other surgery history details: Galblader removed..    Current medications:  Thyroid medication.    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   Primary job tasks include:  Other.   Other job/home tasks details: walk for exercise.                Therapist Generated HPI Evaluation         Type of problem:  Cervical spine.    This is a new condition.  Condition occurred with:  Insidious onset.  Where condition occurred: for unknown reasons.  Patient reports pain:  Upper cervical spine, mid cervical spine, central cervical spine, lower cervical spine and cervical right side.  Pain is described as aching and is constant.  Pain radiates to:  Head, face and shoulder right. Pain is worse during the night.  Progression since onset: gradually improving.  Associated symptoms:  Headache and loss of motion/stiffness. Exacerbated by: turning R, worse with trying to rest or sleep, lifting the shoulder.      Past treatment: did do 1x chiropractor and was told she had compression on her nerves in the right side.   Barriers include:  None as reported by patient.                        Objective:  System              Cervical/Thoracic Evaluation    AROM:  AROM Cervical:    Flexion:            Nil loss  Extension:       Min loss  Rotation:         Left: mod loss     Right: nil loss  Side Bend:      Left: min loss     Right:   Mod loss                    Cervical Stability/Joint Clearing:    Left positive at:1st Rib    Right negative at:  1st Rib  Spinal Segmental Conclusions:    Level:  Hypo at C2, C3 and C4             Shoulder Evaluation:  ROM:  AROM:  normal                                  Strength:      Extension:  Left: 3+/5  Weak/pain free    Pain:    Right: 3+/5    Weak/pain free  Pain:      Internal Rotation:  Left:4+/5   Strong/pain free    Pain:      External Rotation:   Left:4/5   Strong/pain free    Pain:   Right:4/5   Strong/pain free    Pain:                                                     General     ROS    Assessment/Plan:    Patient is a 42 year old female with cervical complaints.    Patient has the following significant findings with corresponding treatment plan.                Diagnosis 1:  Neck Pain  Pain -  manual therapy, self management, education, directional preference exercise and home program  Decreased ROM/flexibility - manual therapy and therapeutic exercise  Decreased joint mobility - manual therapy and therapeutic exercise  Decreased strength - therapeutic exercise and therapeutic activities  Decreased function - therapeutic activities  Impaired posture - neuro re-education    Therapy Evaluation Codes:   1) History comprised of:   Personal factors that impact the plan of care:      Time since onset of symptoms.    Comorbidity factors that impact the plan of care are:      None.     Medications impacting care: None.  2) Examination of Body Systems comprised of:   Body structures and functions that impact the plan of care:      cervical spine, R shoulder.   Activity limitations that impact the plan of care are:      sleeping, resting.  3) Clinical presentation characteristics are:   Stable/Uncomplicated.  4) Decision-Making    Low complexity using standardized patient assessment instrument and/or measureable assessment of functional outcome.  Cumulative Therapy Evaluation is: Low complexity.    Previous  and current functional limitations:  (See Goal Flow Sheet for this information)    Short term and Long term goals: (See Goal Flow Sheet for this information)     Communication ability:  Patient appears to be able to clearly communicate and understand verbal and written communication and follow directions correctly.  Treatment Explanation - The following has been discussed with the patient:   RX ordered/plan of care  Anticipated outcomes  Possible risks and side effects  This patient would benefit from PT intervention to resume normal activities.   Rehab potential is excellent.    Frequency:  1 X week, once daily  Duration:  for 6 weeks  Discharge Plan:  Achieve all LTG.  Independent in home treatment program.  Reach maximal therapeutic benefit.    Please refer to the daily flowsheet for treatment today, total treatment time and time spent performing 1:1 timed codes.

## 2022-09-29 ENCOUNTER — THERAPY VISIT (OUTPATIENT)
Dept: PHYSICAL THERAPY | Facility: CLINIC | Age: 42
End: 2022-09-29
Payer: COMMERCIAL

## 2022-09-29 DIAGNOSIS — M54.2 NECK PAIN: Primary | ICD-10-CM

## 2022-09-29 PROCEDURE — 97110 THERAPEUTIC EXERCISES: CPT | Mod: GP | Performed by: PHYSICAL THERAPIST

## 2022-09-29 PROCEDURE — 97140 MANUAL THERAPY 1/> REGIONS: CPT | Mod: GP | Performed by: PHYSICAL THERAPIST

## 2022-10-06 ENCOUNTER — THERAPY VISIT (OUTPATIENT)
Dept: PHYSICAL THERAPY | Facility: CLINIC | Age: 42
End: 2022-10-06
Payer: COMMERCIAL

## 2022-10-06 DIAGNOSIS — M54.2 NECK PAIN: Primary | ICD-10-CM

## 2022-10-06 PROCEDURE — 97110 THERAPEUTIC EXERCISES: CPT | Mod: GP | Performed by: PHYSICAL THERAPIST

## 2022-10-06 PROCEDURE — 97140 MANUAL THERAPY 1/> REGIONS: CPT | Mod: GP | Performed by: PHYSICAL THERAPIST

## 2022-10-19 ENCOUNTER — THERAPY VISIT (OUTPATIENT)
Dept: PHYSICAL THERAPY | Facility: CLINIC | Age: 42
End: 2022-10-19
Payer: COMMERCIAL

## 2022-10-19 DIAGNOSIS — M54.2 NECK PAIN: Primary | ICD-10-CM

## 2022-10-19 PROCEDURE — 97110 THERAPEUTIC EXERCISES: CPT | Mod: GP | Performed by: PHYSICAL THERAPIST

## 2022-10-19 PROCEDURE — 97140 MANUAL THERAPY 1/> REGIONS: CPT | Mod: GP | Performed by: PHYSICAL THERAPIST

## 2022-10-28 ENCOUNTER — THERAPY VISIT (OUTPATIENT)
Dept: PHYSICAL THERAPY | Facility: CLINIC | Age: 42
End: 2022-10-28
Payer: COMMERCIAL

## 2022-10-28 DIAGNOSIS — M54.2 NECK PAIN: Primary | ICD-10-CM

## 2022-10-28 PROCEDURE — 97110 THERAPEUTIC EXERCISES: CPT | Mod: GP | Performed by: PHYSICAL THERAPIST

## 2022-10-28 PROCEDURE — 97140 MANUAL THERAPY 1/> REGIONS: CPT | Mod: GP | Performed by: PHYSICAL THERAPIST

## 2022-10-31 ENCOUNTER — ANCILLARY PROCEDURE (OUTPATIENT)
Dept: MAMMOGRAPHY | Facility: CLINIC | Age: 42
End: 2022-10-31
Attending: FAMILY MEDICINE
Payer: COMMERCIAL

## 2022-10-31 DIAGNOSIS — Z12.31 VISIT FOR SCREENING MAMMOGRAM: ICD-10-CM

## 2022-10-31 PROCEDURE — 77067 SCR MAMMO BI INCL CAD: CPT | Mod: GC

## 2022-10-31 PROCEDURE — 77063 BREAST TOMOSYNTHESIS BI: CPT | Mod: GC

## 2022-11-15 ENCOUNTER — TRANSCRIBE ORDERS (OUTPATIENT)
Dept: OTHER | Age: 42
End: 2022-11-15

## 2022-11-15 DIAGNOSIS — Z80.3 FAMILY HISTORY OF MALIGNANT NEOPLASM OF BREAST: Primary | ICD-10-CM

## 2022-11-15 DIAGNOSIS — Z80.42 FAMILY HISTORY OF PROSTATE CANCER: ICD-10-CM

## 2022-11-15 DIAGNOSIS — Z80.41 FAMILY HISTORY OF MALIGNANT NEOPLASM OF OVARY: ICD-10-CM

## 2022-11-17 ENCOUNTER — VIRTUAL VISIT (OUTPATIENT)
Dept: ONCOLOGY | Facility: CLINIC | Age: 42
End: 2022-11-17
Attending: GENETIC COUNSELOR, MS
Payer: COMMERCIAL

## 2022-11-17 DIAGNOSIS — Z80.41 FAMILY HISTORY OF MALIGNANT NEOPLASM OF OVARY: ICD-10-CM

## 2022-11-17 DIAGNOSIS — Z80.3 FAMILY HISTORY OF MALIGNANT NEOPLASM OF BREAST: Primary | ICD-10-CM

## 2022-11-17 PROCEDURE — 96040 HC GENETIC COUNSELING, EACH 30 MINUTES: CPT | Mod: GT,95 | Performed by: GENETIC COUNSELOR, MS

## 2022-11-17 NOTE — PROGRESS NOTES
2022    Referring Provider: Lars Burt MD    Presenting Information:   I met with Karen for her video genetic counseling visit, through the Cancer Risk Management Program, to discuss her family history of breast and ovarian cancer. Today we reviewed this history, cancer screening recommendations, and available genetic testing options.    Personal History:  Karen is a 42 year old year old female. She does not have any personal history of cancer. She had her first menstrual period at age 13, her first child at age 33, and is premenopausal. Karen has her ovaries, fallopian tubes and uterus in place, and she has had no ovarian cancer screening to date. She reports that she has not used hormone replacement therapy.      She has annual clinical breast exams and mammograms; her most recent mammogram in 2022 was normal. Karen began having colonoscopies at the age of 34. Her most recent colonoscopy in 2014 was is reported to be normal as the scanned report could not be read. She does not regularly do any other cancer screening at this time.    Family History: (Please see scanned pedigree for detailed family history information)    A paternal aunt was diagnosed with bilateral breast cancer at age 59 and  at age 60.    A paternal uncle was diagnosed with and  from spinal cancer at age 66.    Karen's paternal grandmother was diagnosed with ovarian cancer at age 90 and  at age 91.    A maternal aunt was diagnosed with ovarian cancer at age 59 and is currently 60.    Karen's maternal grandmother was diagnosed with and  from gallbladder cancer at age 82.    Her maternal and paternal ethnicity is Polish. There is no known Ashkenazi Taoism ancestry on either side of her family. There is no reported consanguinity.    Discussion:    Karen's family history of breast and ovarian cancer is suggestive of a hereditary cancer syndrome.    We reviewed the features of sporadic, familial, and  hereditary cancers. We discussed that mutations in either BRCA1 or BRCA2 could be possible hereditary explanations for her family history of cancer. Mutations in the BRCA1 or BRCA2 gene are known to cause Hereditary Breast and Ovarian Cancer Syndrome (HBOC). HBOC typically presents with multiple family members diagnosed with breast cancer before age 50 and/or ovarian cancer. Other cancer risks associated with HBOC include male breast cancer, prostate cancer, pancreatic cancer, and melanoma.     We discussed the natural history and genetics of hereditary cancer. A detailed handout regarding hereditary cancer, along with the other information we discussed, will be mailed to Karen at the end of our appointment today and can be found in the after visit summary. Topics included: inheritance pattern, cancer risks, cancer screening recommendations, and also risks, benefits and limitations of testing.    Based on her personal and family history, Karen meets current National Comprehensive Cancer Network (NCCN) criteria for genetic testing of high-penetrance ovarian cancer susceptibility genes (including BRCA1, BRCA2, BRIP1, MLH1, MSH2, MSH6, PMS2, EPCAM, PALB2, RAD51C, and RAD51D).    We discussed that there are additional genes that could cause increased risk for breast and/or ovarian cancer. As many of these genes present with overlapping features in a family and accurate cancer risk cannot always be established based upon the pedigree analysis alone, it would be reasonable for Karen to consider panel genetic testing to analyze multiple genes at once.    Genetic testing is available for BRCA1/2 as part of the patient's core panel. This will then be automatically reflexed to [facility]'s Common Hereditary Cancers panel.   Genetic testing is available for 47 genes associated with hereditary cancer: Common Hereditary Cancers panel (APC, MILAGRO, AXIN2, BARD1, BMPR1A, BRCA1, BRCA2, BRIP1, CDH1, CDK4, CDKN2A, CHEK2, CTNNA1, DICER1,  EPCAM, GREM1, HOXB13, KIT, MEN1, MLH1, MSH2, MSH3, MSH6, MUTYH, NBN, NF1, NTHL1, PALB2, PDGFRA, PMS2, POLD1, POLE, PTEN, RAD50, RAD51C, RAD51D, SDHA, SDHB, SDHC, SDHD, SMAD4, SMARCA4, STK11, TP53, TSC1, TSC2, and VHL).  We discussed that many of the genes in the Common Hereditary Cancers panel are associated with specific hereditary cancer syndromes and published management guidelines: Hereditary Breast and Ovarian Cancer syndrome (BRCA1, BRCA2), Muniz syndrome (MLH1, MSH2, MSH6, PMS2, EPCAM), Familial Adenomatous Polyposis (APC), Hereditary Diffuse Gastric Cancer (CDH1), Familial Atypical Multiple Mole Melanoma syndrome (CDK4, CDKN2A), Juvenile Polyposis syndrome (BMPR1A, SMAD4), Cowden syndrome (PTEN), Li Fraumeni syndrome (TP53), Peutz-Jeghers syndrome (STK11), MUTYH Associated Polyposis (MUTYH), Tuberous Sclerosis complex (TSC1, TSC2), Neurofibromatosis type 1 (NF1), Multiple Endocrine Neoplasia type 1 (MEN1), Hereditary Paraganglioma and Pheochromocytoma (SDHA, SDHB, SDHC, SDHD), and von Hippel-Lindau (VHL).   The MILAGRO, AXIN2, BRIP1, CHEK2, GREM1, MSH3, NBN, NTHL1, PALB2, POLD1, POLE, RAD51C, and RAD51D genes are associated with increased cancer risk and have published management guidelines for certain cancers.    The remaining genes (BARD1, CTNNA1, DICER1, HOXB13, KIT, PDGFRA, RAD50, and SMARCA4) are associated with increased cancer risk and may allow us to make medical recommendations when mutations are identified.      Karen would like to submit a blood sample for her genetic testing. She will go to her nearest [facility] at her earliest convenience to get her blood drawn for her genetic testing.     Verbal consent was given over video and written on the consent form. Turnaround time is approximately 4 weeks once the lab receives the sample.    Medical Management: For Karen, we reviewed that the information from genetic testing may determine:    additional cancer screening for which Karen may  qualify (i.e. mammogram and breast MRI, more frequent colonoscopies, more frequent dermatologic exams, etc.),    options for risk reducing surgeries Karen could consider (i.e. bilateral mastectomy, surgery to remove her ovaries and/or uterus, etc.),      and targeted chemotherapies if she were to develop certain cancers in the future (i.e. immunotherapy for individuals with Muniz syndrome, PARP inhibitors, etc.).     These recommendations and possible targeted chemotherapies will be discussed in detail once genetic testing is completed.     Plan:  1) Today Karen elected to proceed with BRCA1/2 with automatic reflex to [facility]'s Common Hereditary Cancers panel.  2) A copy of the consent form and the after visit summary will be mailed to Karen.  3) This information should be available in approximately 4 weeks, once the lab receives the sample.  4) I will call Karen with the results once they become available.    Time spent on video: 38 minutes    Tashi Juárez MS, [facility]  Licensed, Certified Genetic Counselor      Karen is a 42 year old who is being evaluated via a billable video visit.      How would you like to obtain your AVS? MyChart  If the video visit is dropped, the invitation should be resent by: Text to cell phone: [phone]  Will anyone else be joining your video visit? No        Video-Visit Details  Type of service:  Video Visit  Originating Location (pt. Location): Home  Distant Location (provider location):  Off-site  Platform used for Video Visit: Joceline

## 2022-11-17 NOTE — LETTER
Cancer Risk Management  Program Locations    [facility]  [facility]  Mailing Address  Cancer Risk Management Program  [facility]  [address]  [place] [address]    New patient appointments  [phone]  2022    Karen Beverly  [address]  [place] [address]      Dear Karen,    It was a pleasure speaking with you over video on 2022. Here is a copy of the progress note from our discussion. If you have any additional questions, please feel free to call.    Referring Provider: Lars Burt MD    Presenting Information:   I met with Karen for her video genetic counseling visit, through the Cancer Risk Management Program, to discuss her family history of breast and ovarian cancer. Today we reviewed this history, cancer screening recommendations, and available genetic testing options.    Personal History:  Karen is a 42 year old year old female. She does not have any personal history of cancer. She had her first menstrual period at age 13, her first child at age 33, and is premenopausal. Karen has her ovaries, fallopian tubes and uterus in place, and she has had no ovarian cancer screening to date. She reports that she has not used hormone replacement therapy.      She has annual clinical breast exams and mammograms; her most recent mammogram in 2022 was normal. Karen began having colonoscopies at the age of 34. Her most recent colonoscopy in 2014 was is reported to be normal as the scanned report could not be read. She does not regularly do any other cancer screening at this time.    Family History: (Please see scanned pedigree for detailed family history information)    A paternal aunt was diagnosed with bilateral breast cancer at age 59 and  at age 60.    A paternal uncle was  diagnosed with and  from spinal cancer at age 66.    Karen's paternal grandmother was diagnosed with ovarian cancer at age 90 and  at age 91.    A maternal aunt was diagnosed with ovarian cancer at age 59 and is currently 60.    Karen's maternal grandmother was diagnosed with and  from gallbladder cancer at age 82.    Her maternal and paternal ethnicity is Polish. There is no known Ashkenazi Holiness ancestry on either side of her family. There is no reported consanguinity.    Discussion:    Karen's family history of breast and ovarian cancer is suggestive of a hereditary cancer syndrome.    We reviewed the features of sporadic, familial, and hereditary cancers. We discussed that mutations in either BRCA1 or BRCA2 could be possible hereditary explanations for her family history of cancer. Mutations in the BRCA1 or BRCA2 gene are known to cause Hereditary Breast and Ovarian Cancer Syndrome (HBOC). HBOC typically presents with multiple family members diagnosed with breast cancer before age 50 and/or ovarian cancer. Other cancer risks associated with HBOC include male breast cancer, prostate cancer, pancreatic cancer, and melanoma.     We discussed the natural history and genetics of hereditary cancer. A detailed handout regarding hereditary cancer, along with the other information we discussed, will be mailed to Karen at the end of our appointment today and can be found in the after visit summary. Topics included: inheritance pattern, cancer risks, cancer screening recommendations, and also risks, benefits and limitations of testing.    Based on her personal and family history, Karen meets current National Comprehensive Cancer Network (NCCN) criteria for genetic testing of high-penetrance ovarian cancer susceptibility genes (including BRCA1, BRCA2, BRIP1, MLH1, MSH2, MSH6, PMS2, EPCAM, PALB2, RAD51C, and RAD51D).    We discussed that there are additional genes that could cause increased risk for breast  and/or ovarian cancer. As many of these genes present with overlapping features in a family and accurate cancer risk cannot always be established based upon the pedigree analysis alone, it would be reasonable for [facility] to consider panel genetic testing to analyze multiple genes at once.    Genetic testing is available for BRCA1/2 as part of the patient's core panel. This will then be automatically reflexed to [facility]'s Common Hereditary Cancers panel.   Genetic testing is available for 47 genes associated with hereditary cancer: Common Hereditary Cancers panel (APC, MILAGRO, AXIN2, BARD1, BMPR1A, BRCA1, BRCA2, BRIP1, CDH1, CDK4, CDKN2A, CHEK2, CTNNA1, DICER1, EPCAM, GREM1, HOXB13, KIT, MEN1, MLH1, MSH2, MSH3, MSH6, MUTYH, NBN, NF1, NTHL1, PALB2, PDGFRA, PMS2, POLD1, POLE, PTEN, RAD50, RAD51C, RAD51D, SDHA, SDHB, SDHC, SDHD, SMAD4, SMARCA4, STK11, TP53, TSC1, TSC2, and VHL).  We discussed that many of the genes in the Common Hereditary Cancers panel are associated with specific hereditary cancer syndromes and published management guidelines: Hereditary Breast and Ovarian Cancer syndrome (BRCA1, BRCA2), Muniz syndrome (MLH1, MSH2, MSH6, PMS2, EPCAM), Familial Adenomatous Polyposis (APC), Hereditary Diffuse Gastric Cancer (CDH1), Familial Atypical Multiple Mole Melanoma syndrome (CDK4, CDKN2A), Juvenile Polyposis syndrome (BMPR1A, SMAD4), Cowden syndrome (PTEN), Li Fraumeni syndrome (TP53), Peutz-Jeghers syndrome (STK11), MUTYH Associated Polyposis (MUTYH), Tuberous Sclerosis complex (TSC1, TSC2), Neurofibromatosis type 1 (NF1), Multiple Endocrine Neoplasia type 1 (MEN1), Hereditary Paraganglioma and Pheochromocytoma (SDHA, SDHB, SDHC, SDHD), and von Hippel-Lindau (VHL).   The MILAGRO, AXIN2, BRIP1, CHEK2, GREM1, MSH3, NBN, NTHL1, PALB2, POLD1, POLE, RAD51C, and RAD51D genes are associated with increased cancer risk and have published management guidelines for certain cancers.    The remaining genes (BARD1, CTNNA1, DICER1,  HOXB13, KIT, PDGFRA, RAD50, and SMARCA4) are associated with increased cancer risk and may allow us to make medical recommendations when mutations are identified.      Karen would like to submit a blood sample for her genetic testing. She will go to her nearest [facility] at her earliest convenience to get her blood drawn for her genetic testing.     Verbal consent was given over video and written on the consent form. Turnaround time is approximately 4 weeks once the lab receives the sample.    Medical Management: For Karen, we reviewed that the information from genetic testing may determine:    additional cancer screening for which Karen may qualify (i.e. mammogram and breast MRI, more frequent colonoscopies, more frequent dermatologic exams, etc.),    options for risk reducing surgeries Karen could consider (i.e. bilateral mastectomy, surgery to remove her ovaries and/or uterus, etc.),      and targeted chemotherapies if she were to develop certain cancers in the future (i.e. immunotherapy for individuals with Muniz syndrome, PARP inhibitors, etc.).     These recommendations and possible targeted chemotherapies will be discussed in detail once genetic testing is completed.     Plan:  1) Today Karen elected to proceed with BRCA1/2 with automatic reflex to [facility]'s Common Hereditary Cancers panel.  2) A copy of the consent form and the after visit summary will be mailed to Karen.  3) This information should be available in approximately 4 weeks, once the lab receives the sample.  4) I will call Karen with the results once they become available.    Time spent on video: 38 minutes    Tashi Juárez MS, [facility] [place] [facility]  Licensed, Certified Genetic Counselor

## 2022-11-23 ENCOUNTER — LAB (OUTPATIENT)
Dept: LAB | Facility: CLINIC | Age: 42
End: 2022-11-23
Payer: COMMERCIAL

## 2022-11-23 DIAGNOSIS — Z80.3 FAMILY HISTORY OF MALIGNANT NEOPLASM OF BREAST: ICD-10-CM

## 2022-11-23 DIAGNOSIS — Z80.41 FAMILY HISTORY OF MALIGNANT NEOPLASM OF OVARY: ICD-10-CM

## 2022-11-23 PROCEDURE — 99000 SPECIMEN HANDLING OFFICE-LAB: CPT

## 2022-11-23 PROCEDURE — 36415 COLL VENOUS BLD VENIPUNCTURE: CPT

## 2022-11-29 ENCOUNTER — THERAPY VISIT (OUTPATIENT)
Dept: PHYSICAL THERAPY | Facility: CLINIC | Age: 42
End: 2022-11-29
Payer: COMMERCIAL

## 2022-11-29 DIAGNOSIS — M54.2 NECK PAIN: Primary | ICD-10-CM

## 2022-11-29 PROCEDURE — 97140 MANUAL THERAPY 1/> REGIONS: CPT | Mod: GP | Performed by: PHYSICAL THERAPIST

## 2022-11-29 PROCEDURE — 97110 THERAPEUTIC EXERCISES: CPT | Mod: GP | Performed by: PHYSICAL THERAPIST

## 2022-11-30 ENCOUNTER — TRANSFERRED RECORDS (OUTPATIENT)
Dept: HEALTH INFORMATION MANAGEMENT | Facility: CLINIC | Age: 42
End: 2022-11-30

## 2022-11-30 LAB
ALT SERPL-CCNC: 21 IU/L (ref 0–32)
AST SERPL-CCNC: 21 IU/L (ref 0–40)
CREATININE (EXTERNAL): 0.53 MG/DL (ref 0.57–1)
GFR ESTIMATED (EXTERNAL): 117 ML/MIN/1.73
GLUCOSE (EXTERNAL): 87 MG/DL (ref 70–99)
POTASSIUM (EXTERNAL): 3.9 MMOL/L (ref 3.5–5.2)

## 2022-12-01 ENCOUNTER — TRANSFERRED RECORDS (OUTPATIENT)
Dept: HEALTH INFORMATION MANAGEMENT | Facility: CLINIC | Age: 42
End: 2022-12-01

## 2022-12-01 LAB — SCANNED LAB RESULT: NORMAL

## 2022-12-01 NOTE — PATIENT INSTRUCTIONS
Assessing Cancer Risk  Only about 5-10% of cancers are thought to be due to an inherited cancer susceptibility gene.    These families often have:  Several people with the same or related types of cancer  Cancers diagnosed at a young age (before age 50)  Individuals with more than one primary cancer  Multiple generations of the family affected with cancer    Some people may be candidates for genetic testing of more than one gene.  For these families, genetic testing using a cancer panel may be offered.  These panels will test different genes known to increase the risk for breast, ovarian, uterine, and/or other cancers. All of the genes discussed below have published clinical management guidelines for individuals who are found to carry a mutation. The purpose of this handout is to serve as a brief summary of the genes analyzed by the panels used to inquire about hereditary breast and gynecologic cancer:  MILAGRO, BRCA1, BRCA2, BRIP1, CDH1, CHEK2, MLH1, MSH2, MSH6, PMS2, EPCAM, PTEN, PALB2, RAD51C, RAD51D, and TP53.  ______________________________________________________________________________  Hereditary Breast and Ovarian Cancer Syndrome   (BRCA1 and BRCA2)  A single mutation in one of the copies of BRCA1 or BRCA2 increases the risk for breast and ovarian cancer, among others.  The risk for pancreatic cancer and melanoma may also be slightly increased in some families.  The chart below shows the chance that someone with a BRCA mutation would develop cancer in his or her lifetime1,2,3,4.        A person s ethnic background is also important to consider, as individuals of Ashkenazi Evangelical ancestry have a higher chance of having a BRCA gene mutation.  There are three BRCA mutations that occur more frequently in this population.    Muniz Syndrome   (MLH1, MSH2, MSH6, PMS2, and EPCAM)  Currently five genes are known to cause Muniz Syndrome: MLH1, MSH2, MSH6, PMS2, and EPCAM.  A single mutation in one of the Muniz  Syndrome genes increases the risk for colon, endometrial, ovarian, and stomach cancers.  Other cancers that occur less commonly in Muniz Syndrome include urinary tract, skin, and brain cancers.  The chart below shows the chance that a person with Muniz syndrome would develop cancer in his or her lifetime5.      *Cancer risk varies depending on Muniz syndrome gene found    Cowden Syndrome   (PTEN)  Cowden syndrome is a hereditary condition that increases the risk for breast, thyroid, endometrial, colon, and kidney cancer.  Cowden syndrome is caused by a mutation in the PTEN gene.  A single mutation in one of the copies of PTEN causes Cowden syndrome and increases cancer risk.  The chart below shows the chance that someone with a PTEN mutation would develop cancer in their lifetime6,7.  Other benign features seen in some individuals with Cowden syndrome include benign skin lesions (facial papules, keratoses, lipomas), learning disability, autism, thyroid nodules, colon polyps, and larger head size.      *One recent study found breast cancer risk to be increased to 85%    Li-Fraumeni Syndrome   (TP53)  Li-Fraumeni Syndrome (LFS) is a cancer predisposition syndrome caused by a mutation in the TP53 gene. A single mutation in one of the copies of TP53 increases the risk for multiple cancers. Individuals with LFS are at an increased risk for developing cancer at a young age. The lifetime risk for development of a LFS-associated cancer is 50% by age 30 and 90% by age 60.   Core Cancers: Sarcomas, Breast, Brain, Lung, Leukemias/Lymphomas, Adrenocortical carcinomas  Other Cancers: Gastrointestinal, Thyroid, Skin, Genitourinary    Hereditary Diffuse Gastric Cancer   (CDH1)  Currently, one gene is known to cause hereditary diffuse gastric cancer (HDGC): CDH1.  Individuals with HDGC are at increased risk for diffuse gastric cancer and lobular breast cancer. Of people diagnosed with HDGC, 30-50% have a mutation in the CDH1 gene.   This suggests there are likely other genes that may cause HDGC that have not been identified yet.      Lifetime Cancer Risks    General Population HDGC    Diffuse Gastric  <1% ~80%   Breast 12% 39-52%         Additional Genes  MILAGRO  MILAGRO is a moderate-risk breast cancer gene. Women who have a mutation in MILAGRO can have between a 2-4 fold increased risk for breast cancer compared to the general population8. MILAGRO mutations have also been associated with increased risk for pancreatic cancer, however an estimate of this cancer risk is not well understood9. Individuals who inherit two MILAGRO mutations have a condition called ataxia-telangiectasia (AT).  This rare autosomal recessive condition affects the nervous system and immune system, and is associated with progressive cerebellar ataxia beginning in childhood.  Individuals with ataxia-telangiectasia often have a weakened immune system and have an increased risk for childhood cancers.    PALB2  Mutations in PALB2 have been shown to increase the risk of breast cancer up to 33-58% in some families; where individuals fall within this risk range is dependent upon family [id number]. PALB2 mutations have also been associated with increased risk for pancreatic cancer, although this risk has not been quantified yet.  Individuals who inherit two PALB2 mutations--one from their mother and one from their father--have a condition called Fanconi Anemia.  This rare autosomal recessive condition is associated with short stature, developmental delay, bone marrow failure, and increased risk for childhood cancers.    CHEK2   CHEK2 is a moderate-risk breast cancer gene.  Women who have a mutation in CHEK2 have around a 2-fold increased risk for breast cancer compared to the general population, and this risk may be higher depending upon family history.11,12,13 Mutations in CHEK2 have also been shown to increase the risk of a number of other cancers, including colon and prostate, however these  cancer risks are currently not well understood.    BRIP1, RAD51C and RAD51D  Mutations in BRIP1, RAD51C, and RAD51D have been shown to increase the risk of ovarian cancer and possibly female breast cancer as well14,15 .       Lifetime Cancer Risk    General Population BRIP1 RAD51C RAD51D   Ovarian 1-2% ~5-8% ~5-9% ~7-15%           Inheritance  All of the cancer syndromes reviewed above are inherited in an autosomal dominant pattern.  This means that if a parent has a mutation, each of his or her children will have a 50% chance of inheriting that same mutation.  Therefore, each child--male or female--would have a 50% chance of being at increased risk for developing cancer.      Image obtained from Genetics Home Reference, 2013     Mutations in some genes can occur de delfin, which means that a person s mutation occurred for the first time in them and was not inherited from a parent.  Now that they have the mutation, however, it can be passed on to future generations.    Genetic Testing  Genetic testing involves a blood test and will look at the genetic information in the MILAGRO, BRCA1, BRCA2, BRIP1, CDH1, CHEK2, MLH1, MSH2, MSH6, PMS2, EPCAM, PTEN, PALB2, RAD51C, RAD51D, and TP53 genes for any harmful mutations that are associated with increased cancer risk.  If possible, it is recommended that the person(s) who has had cancer be tested before other family members.  That person will give us the most useful information about whether or not a specific gene is associated with the cancer in the family.    Results  There are three possible results of genetic testing:  Positive--a harmful mutation was identified in one or more of the genes  Negative--no mutation was identified in any of the genes on this panel  Variant of unknown significance--a variation in one of the genes was identified, but it is unclear how this impacts cancer risk in the family    Advantages and Disadvantages   There are advantages and disadvantages to  genetic testing.    Advantages  May clarify your cancer risk  Can help you make medical decisions  May explain the cancers in your family  May give useful information to your family members (if you share your results)    Disadvantages  Possible negative emotional impact of learning about inherited cancer risk  Uncertainty in interpreting a negative test result in some situations  Possible genetic discrimination concerns (see below)    Genetic Information Nondiscrimination Act (JAYY)  JAYY is a federal law that protects individuals from health insurance or employment discrimination based on a genetic test result alone.  Although rare, there are currently no legal discrimination protections in terms of life insurance, long term care, or disability insurances.  Visit the [employer] website to learn more.    Reducing Cancer Risk  All of the genes described above have nationally recognized cancer screening guidelines that would be recommended for individuals who test positive.  In addition to increased cancer screening, surgeries may be offered or recommended to reduce cancer risk.  Recommendations are based upon an individual s genetic test result as well as their personal and family history of cancer.    Questions to Think About Regarding Genetic Testing:  What effect will the test result have on me and my relationship with my family members if I have an inherited gene mutation?  If I don t have a gene mutation?  Should I share my test results, and how will my family react to this news, which may also affect them?  Are my children ready to learn new information that may one day affect their own health?    Hereditary Cancer Resources    FORCE: Facing Our Risk of Cancer Empowered facingourrisk.org   Bright Pink bebrightpink.org   Li-Fraumeni Syndrome Association lfsassociation.org   PTEN World PTENworld.[email or web address]   No stomach for cancer, Inc. nostomachforcancer.org   Stomach cancer relief network  Scrnet.org   Collaborative Group of the Americas on Inherited Colorectal Cancer (CGA) cgaicc.com    Cancer Care cancercare.org   American Cancer Society (ACS) cancer.org   National Cancer [place] (NCI) cancer.gov     Please call us if you have any questions or concerns.   Cancer Risk Management Program [phone]-CANCER ([phone])  Tashi Juárez, [place] [facility]  [phone]  Ruby Kirk, MS, [facility] [phone]  Davida Vargas, MS, [facility]  [phone]  Lenora Jones, MS, [facility]  [phone]  Brenda Camilo, MS, [facility]  [phone]  Patricia Vickers, MS, [facility] [phone]  Ame Koenig, MS, [facility] [phone]    References  Valerie Washburn PDP, Bernice S, Lidya RIGGS, Gonsalo JE, Amanda JL, Uriel N, Shaheen H, Yves O, Pb A, Juliana B, Montserrat P, Manchioma S, Oracio DM, Soham N, Erick E, Timmy H, Reilly E, Lubinski J, Gronwald J, Nicole B, Elizabeth H, Thorlacius S, Eerola H, Alicia H, Mike K, Francisca OP. Average risks of breast and ovarian cancer associated with BRCA1 or BRCA2 mutations detected in case series unselected for family history: a combined analysis of 222 studies. Am J Hum Kerri. 2003;72:1117-30.  Tom N, Esperanza M, Sin G.  BRCA1 and BRCA2 Hereditary Breast and Ovarian Cancer. Gene Reviews online. 2013.  Minor YC, Anant S, Natalee G, Knox S. Breast cancer risk among male BRCA1 and BRCA2 mutation carriers. J Natl Cancer Inst. 2007;99:1811-4.  Doyle BROOKS, Skyler I, Aldair J, Tamra E, Elias ER, Rosemary F. Risk of breast cancer in male BRCA2 carriers. J Med Kerri. 2010;47:710-1.  National Comprehensive Cancer Network. Clinical practice guidelines in oncology, colorectal cancer screening. Available online (registration required). 2015.  Mychal BUCKLEY, Fercho J, Colby J, Zackery LA, Jenny MS, Eng C. Lifetime cancer risks in individuals with germline PTEN mutations. Clin Cancer Res. 2012;18:400-7.  Debbie SEGURA. Cowden Syndrome: A Critical Review of the Clinical Literature. J Kerri .  2009:18:13-27.  Latonya A, Herman D, Mago S, Kelli P, Eduardo T, Uziel M, Stanton B, Ericka H, Kyle R, Bert K, Shiv L, Doyle DG, Oracio D, Woody DF, Araseli MR, The Breast Cancer Susceptibility Collaboration () & Ev PEGUERO. MILAGRO mutations that cause ataxia-telangiectasia are breast cancer susceptibility alleles. Nature Genetics. 2006;38:873-875  Rene N , Rajan Y, Rashmi J, Latanya L, Geno GM , Kisha ML, Gallinger S, Mitchell AG, Syngal S, Jason ML, Alphonso J , Mildred R, Bri SZ, Richie JR, Leoncio VE, Kelly M, Vosera B, Carlos Enrique N, Ana RH, Gwen KW, and Humphrey AP. MILAGRO mutations in patients with hereditary pancreatic cancer. Cancer Discover. 2012;2:41-46  Mirna RANGEL., et al. Breast-Cancer Risk in Families with Mutations in PALB2. NEJM. 2014; 371(6):497-506.  CHEK2 Breast Cancer Case-Control Consortium. CHEK2*1100delC and susceptibility to breast cancer: A collaborative analysis involving 10,860 breast cancer cases and 9,065 controls from 10 studies. Am J Hum Kerri, 74 (2004), pp. 7687-8125  Timmy T, Chema S, Milka K, et al. Spectrum of Mutations in BRCA1, BRCA2, CHEK2, and TP53 in Families at High Risk of Breast Cancer. MUKESH. 2006;295(12):9278-2911.   Meghan C, Juanita D, Rodger A, et al. Risk of breast cancer in women with a CHEK2 mutation with and without a family history of breast cancer. J Clin Oncol. 2011;29:4202-0108.  Rickey H, Concha E, Bean SJ, et al. Contribution of germline mutations in the RAD51B, RAD51C, and RAD51D genes to ovarian cancer in the population. J Clin Oncol. 2015;33(26):3471-8515. Doi:10.1200/JCO.2015.61.2408.  Ace Marieon DF, Yannick P, et al. Mutations in BRIP1 confer high risk of ovarian cancer. Lynda Kerri. 2011;43(11):2452-7612. doi:10.1038/ng.955.

## 2022-12-05 ENCOUNTER — VIRTUAL VISIT (OUTPATIENT)
Dept: ONCOLOGY | Facility: CLINIC | Age: 42
End: 2022-12-05
Attending: GENETIC COUNSELOR, MS
Payer: COMMERCIAL

## 2022-12-05 DIAGNOSIS — Z80.41 FAMILY HISTORY OF MALIGNANT NEOPLASM OF OVARY: ICD-10-CM

## 2022-12-05 DIAGNOSIS — Z80.3 FAMILY HISTORY OF MALIGNANT NEOPLASM OF BREAST: Primary | ICD-10-CM

## 2022-12-05 PROCEDURE — 999N000069 HC STATISTIC GENETIC COUNSELING, < 16 MIN: Mod: GT,95 | Performed by: GENETIC COUNSELOR, MS

## 2022-12-05 NOTE — Clinical Note
"Hello,    Please enclose a copy of the test report from the laboratory tab titled \"laboratory miscellaneous order\" dated 11/23/22 (Order [id number]) to send to the patient along with the letter.    Referring provider: please see below for summary of genetic test results for your reference.    Thank you,  Tashi"

## 2022-12-05 NOTE — LETTER
"    Cancer Risk Management  Program [facility]  [facility]  Mailing Address  Cancer Risk Management Program  [address]  [place] [address]    New patient appointments  [phone]  December 5, 2022    Karen Beverly  [address]  [place] [address]    Dear Karen,  It was a pleasure speaking with you over video on 12/5/2022. Here is a copy of the progress note from our discussion. If you have any additional questions, please feel free to call.    Referring Provider: Lars Burt MD  Presenting Information:  I spoke to Karen by video today to discuss her genetic testing results. Her blood was drawn on 11/23/22. The Common Hereditary Cancers panel was ordered from [facility]. This testing was done because of Karen's family history of breast and ovarian cancer.    Genetic Testing Result: NEGATIVE  Karen is negative for mutations in APC, MILAGRO, AXIN2, BARD1, BMPR1A, BRCA1, BRCA2, BRIP1, CDH1, CDK4, CDKN2A, CHEK2, CTNNA1, DICER1, EPCAM, GREM1, HOXB13, KIT, MEN1, MLH1, MSH2, MSH3, MSH6, MUTYH, NBN, NF1, NTHL1, PALB2, PDGFRA, PMS2, POLD1, POLE, PTEN, RAD50, RAD51C, RAD51D, SDHA, SDHB, SDHC, SDHD, SMAD4, SMARCA4, STK11, TP53, TSC1, TSC2, and VHL. No mutations were found in any of the 47 genes analyzed. This test involved sequencing and deletion/duplication analysis of all genes with the exceptions of EPCAM and GREM1 (deletions/duplications only) and SDHA (sequencing only).      A copy of the test report can be found in the Laboratory tab, dated 11/23/22, and named \"LABORATORY MISCELLANEOUS ORDER\". The report is scanned in as a linked document.  Interpretation:  We discussed several different interpretations of this negative test result.    1. One explanation may be that there is a different gene or " combination of genes and environment that are associated with the cancers in this family.  2. It is possible that her paternal grandmother and/or aunts with cancer did have a mutation in one of these genes and she did not inherit it.  3. There is also a small possibility that there is a mutation in one of these genes, and the testing laboratory could not find it with their current testing methods.     Screening:  Based on this negative test result, it is important for Karen and her relatives to refer back to the family history for appropriate cancer screening.      Based on her personal and family history, Karen has a 16.2% lifetime risk of developing breast cancer based on the NILA model. Therefore, Karen does not meet current National Comprehensive Cancer Network (NCCN) guidelines for high risk breast screening, which is offered to women with a 20% lifetime risk or higher. However, it is still important for Karen to continue with routine breast screening under the care of her physicians. Breast cancer screening is generally recommended to begin approximately 10 years younger than the earliest age of breast cancer diagnosis in the family, or at age 40, whichever comes first. In this family, screening may begin at age 40. Karen is encouraged to discuss breast screening with her physicians.     Other population cancer screening options, such as those recommended by the American Cancer Society and the National Comprehensive Cancer Network (NCCN), are also appropriate for Karen and her family. These screening recommendations may change if there are changes to Karen's personal and/or family history of cancer. Final screening recommendations should be made by each individual's primary care provider.    Inheritance:  We reviewed autosomal dominant inheritance. We discussed that Karen did not pass on an identifiable mutation in these genes to her children based on this test result. Mutations in these genes do not skip  generations.    Additional Testing Considerations:  Although Karen's genetic testing result was negative, other relatives may still carry a gene mutation associated with breast and/or ovarian cancer. Genetic counseling is recommended for other close relatives on both sides of the family to discuss genetic testing options. If any of these relatives do pursue genetic testing, Karen is encouraged to contact me so that we may review the impact of their test results on her.  Summary:  We do not have an explanation for Karen's family history of cancer. While no genetic changes were identified, Karen may still be at risk for certain cancers due to family history, environmental factors, or other genetic causes not identified by this test. Because of that, it is important that she continue with cancer screening based on her personal and family history as discussed above.    Genetic testing is rapidly advancing, and new cancer susceptibility genes will most likely be identified in the future. Therefore, I encouraged Karen to contact me annually or if there are changes in her personal or family history. This may change how we assess her cancer risk, screening, and the testing we would offer.  Plan:  1.  A copy of the test results will be mailed to Karen.  2. She plans to follow-up with her other providers.  3. She should contact me regularly, or sooner if her family history changes.    If Karen has any further questions, I encouraged her to contact me via [employer].    Time spent on video: 13 minutes.    Tashi Juárez MS, [facility]  Licensed, Certified Genetic Counselor

## 2022-12-05 NOTE — PROGRESS NOTES
"12/5/2022    Referring Provider: Lars Burt MD    Presenting Information:  I spoke to Karen by video today to discuss her genetic testing results. Her blood was drawn on 11/23/22. The Common Hereditary Cancers panel was ordered from [facility]. This testing was done because of Karen's family history of breast and ovarian cancer.    Genetic Testing Result: NEGATIVE  Karen is negative for mutations in APC, MILAGRO, AXIN2, BARD1, BMPR1A, BRCA1, BRCA2, BRIP1, CDH1, CDK4, CDKN2A, CHEK2, CTNNA1, DICER1, EPCAM, GREM1, HOXB13, KIT, MEN1, MLH1, MSH2, MSH3, MSH6, MUTYH, NBN, NF1, NTHL1, PALB2, PDGFRA, PMS2, POLD1, POLE, PTEN, RAD50, RAD51C, RAD51D, SDHA, SDHB, SDHC, SDHD, SMAD4, SMARCA4, STK11, TP53, TSC1, TSC2, and VHL. No mutations were found in any of the 47 genes analyzed. This test involved sequencing and deletion/duplication analysis of all genes with the exceptions of EPCAM and GREM1 (deletions/duplications only) and SDHA (sequencing only).      A copy of the test report can be found in the Laboratory tab, dated 11/23/22, and named \"LABORATORY MISCELLANEOUS ORDER\". The report is scanned in as a linked document.    Interpretation:  We discussed several different interpretations of this negative test result.    1. One explanation may be that there is a different gene or combination of genes and environment that are associated with the cancers in this family.  2. It is possible that her paternal grandmother and/or aunts with cancer did have a mutation in one of these genes and she did not inherit it.  3. There is also a small possibility that there is a mutation in one of these genes, and the testing laboratory could not find it with their current testing methods.       Screening:  Based on this negative test result, it is important for Karen and her relatives to refer back to the family history for appropriate cancer screening.      Based on her personal and family history, Karen has a 16.2% lifetime risk of developing " breast cancer based on the NILA model. Therefore, Karen does not meet current National Comprehensive Cancer Network (NCCN) guidelines for high risk breast screening, which is offered to women with a 20% lifetime risk or higher. However, it is still important for Karen to continue with routine breast screening under the care of her physicians. Breast cancer screening is generally recommended to begin approximately 10 years younger than the earliest age of breast cancer diagnosis in the family, or at age 40, whichever comes first. In this family, screening may begin at age 40. Karen is encouraged to discuss breast screening with her physicians.     Other population cancer screening options, such as those recommended by the American Cancer Society and the National Comprehensive Cancer Network (NCCN), are also appropriate for Karen and her family. These screening recommendations may change if there are changes to Karen's personal and/or family history of cancer. Final screening recommendations should be made by each individual's primary care provider.      Inheritance:  We reviewed autosomal dominant inheritance. We discussed that Karen did not pass on an identifiable mutation in these genes to her children based on this test result. Mutations in these genes do not skip generations.      Additional Testing Considerations:  Although Karen's genetic testing result was negative, other relatives may still carry a gene mutation associated with breast and/or ovarian cancer. Genetic counseling is recommended for other close relatives on both sides of the family to discuss genetic testing options. If any of these relatives do pursue genetic testing, Karen is encouraged to contact me so that we may review the impact of their test results on her.    Summary:  We do not have an explanation for Karen's family history of cancer. While no genetic changes were identified, Karen may still be at risk for certain cancers due to family  history, environmental factors, or other genetic causes not identified by this test. Because of that, it is important that she continue with cancer screening based on her personal and family history as discussed above.    Genetic testing is rapidly advancing, and new cancer susceptibility genes will most likely be identified in the future. Therefore, I encouraged Karen to contact me annually or if there are changes in her personal or family history. This may change how we assess her cancer risk, screening, and the testing we would offer.    Plan:  1.  A copy of the test results will be mailed to Karen.  2. She plans to follow-up with her other providers.  3. She should contact me regularly, or sooner if her family history changes.    If Karen has any further questions, I encouraged her to contact me via [employer].    Time spent on video: 13 minutes.    Tashi Juárez MS, [facility]  Licensed, Certified Genetic Counselor      Karen is a 42 year old who is being evaluated via a billable video visit.      Pt is in [place]    How would you like to obtain your AVS? [email or web address]  If the video visit is dropped, the invitation should be resent by: Text to cell phone: [phone]  Will anyone else be joining your video visit? No      Brooklynn DE LA VEGA    Video-Visit Details  Type of service:  Video Visit  Originating Location (pt. Location): Home  Distant Location (provider location):  Off-site  Platform used for Video Visit: AWAK

## 2022-12-06 ENCOUNTER — THERAPY VISIT (OUTPATIENT)
Dept: PHYSICAL THERAPY | Facility: CLINIC | Age: 42
End: 2022-12-06
Payer: COMMERCIAL

## 2022-12-06 ENCOUNTER — ANCILLARY PROCEDURE (OUTPATIENT)
Dept: ULTRASOUND IMAGING | Facility: CLINIC | Age: 42
End: 2022-12-06
Attending: NURSE PRACTITIONER
Payer: COMMERCIAL

## 2022-12-06 DIAGNOSIS — K59.00 CONSTIPATION, UNSPECIFIED CONSTIPATION TYPE: ICD-10-CM

## 2022-12-06 DIAGNOSIS — M54.2 NECK PAIN: Primary | ICD-10-CM

## 2022-12-06 DIAGNOSIS — R10.11 RUQ ABDOMINAL PAIN: ICD-10-CM

## 2022-12-06 DIAGNOSIS — Z90.49 HISTORY OF CHOLECYSTECTOMY: ICD-10-CM

## 2022-12-06 PROCEDURE — 76705 ECHO EXAM OF ABDOMEN: CPT | Mod: TC | Performed by: RADIOLOGY

## 2022-12-06 PROCEDURE — 97110 THERAPEUTIC EXERCISES: CPT | Mod: GP | Performed by: PHYSICAL THERAPIST

## 2022-12-06 PROCEDURE — 97140 MANUAL THERAPY 1/> REGIONS: CPT | Mod: GP | Performed by: PHYSICAL THERAPIST

## 2022-12-07 NOTE — RESULT ENCOUNTER NOTE
Dear Karen Beverly    I received a copy of your abdominal Ultrasound ordered by [facility] provider, please review result with GI ordering provider.  Extra hepatic biliary prominence measuring 10 mm likely reflects post-cholecystectomy reservoir effect. Correlation with biliary enzymes recommended.   Hepatic steatosis or fatty liver, reduce fat and sugar in your diet and increase physical activity to goal of 30 minutes most days.  Best wishes,  Lars Burt MD

## 2022-12-19 ENCOUNTER — THERAPY VISIT (OUTPATIENT)
Dept: PHYSICAL THERAPY | Facility: CLINIC | Age: 42
End: 2022-12-19
Payer: COMMERCIAL

## 2022-12-19 DIAGNOSIS — M54.2 NECK PAIN: Primary | ICD-10-CM

## 2022-12-19 PROCEDURE — 97110 THERAPEUTIC EXERCISES: CPT | Mod: GP | Performed by: PHYSICAL THERAPIST

## 2022-12-19 PROCEDURE — 97140 MANUAL THERAPY 1/> REGIONS: CPT | Mod: GP | Performed by: PHYSICAL THERAPIST

## 2023-01-18 ENCOUNTER — TRANSFERRED RECORDS (OUTPATIENT)
Dept: HEALTH INFORMATION MANAGEMENT | Facility: CLINIC | Age: 43
End: 2023-01-18
Payer: COMMERCIAL

## 2023-01-24 ENCOUNTER — MYC MEDICAL ADVICE (OUTPATIENT)
Dept: SURGERY | Facility: CLINIC | Age: 43
End: 2023-01-24
Payer: COMMERCIAL

## 2023-02-02 ENCOUNTER — OFFICE VISIT (OUTPATIENT)
Dept: SURGERY | Facility: CLINIC | Age: 43
End: 2023-02-02
Payer: COMMERCIAL

## 2023-02-02 VITALS
HEART RATE: 85 BPM | OXYGEN SATURATION: 100 % | WEIGHT: 155.3 LBS | BODY MASS INDEX: 26.51 KG/M2 | HEIGHT: 64 IN | SYSTOLIC BLOOD PRESSURE: 115 MMHG | DIASTOLIC BLOOD PRESSURE: 76 MMHG

## 2023-02-02 DIAGNOSIS — L29.0 PRURITUS ANI: Primary | ICD-10-CM

## 2023-02-02 PROCEDURE — 99213 OFFICE O/P EST LOW 20 MIN: CPT | Performed by: NURSE PRACTITIONER

## 2023-02-02 ASSESSMENT — PAIN SCALES - GENERAL: PAINLEVEL: NO PAIN (0)

## 2023-02-02 NOTE — LETTER
"2023       RE: Karen Beverly  [address]  [place] [address]     Dear Colleague,    Thank you for referring your patient, Karen Beverly, to the [facility] [place] [facility]. Please see a copy of my visit note below.    Colon and Rectal Surgery Follow-Up Clinic Note    RE: Karen Beverly  : 1980  WALTER: 2023    Karen Beverly is a very pleasant 42 year old female here for follow-up of hemorrhoids.    Interval history: I saw Karen in 2021 with hemorrhoid prolapse with banding at that time and pruritus ani. After going to [place] at the end of last year she got diarrhea. Hemorrhoids got worse after that. Perianal area got very red. Saw her OBGYN and started a silver cream that she didn't find helpful. She is having itching and some sharp pain internally. Itching is worse at night. Using Calmosepting and Desitin and hydrocortisone and a hemorrhoid cream. Still has tissue that drops out but cannot always get it back in as the anus feels too loose. Stools are no longer hard. Taking Citrucel pills.   Colonoscopy in  was normal.    Physical Examination: Exam was chaperoned by Merlin Youngblood, EMT-P   /76 (BP Location: Left arm, Patient Position: Sitting, Cuff Size: Adult Large)   Pulse 85   Ht 5' 4\"   Wt 155 lb 4.8 oz   SpO2 100%   BMI 26.66 kg/m    General: alert, oriented, in no acute distress, sitting comfortably  HEENT:mucous membranes moist  Perianal external examination:  Perianal skin: excoriation and fissuring of perianal skin.  Lesions: No evidence of an external lesion, nodularity, or induration in the perianal region.  Eversion of buttocks: There was not evidence of an anal fissure. Details: N/A.  Skin tags or external hemorrhoids: Small skin tag in the posterior midline    Digital rectal examination: Was performed.   Sphincter tone: Good.  Palpable lesions: No.  Other: " None.  Bimanual examination: was not performed.    Anoscopy: Was performed.   Hemorrhoids: No significant internal hemorrhoids.  Lesions: No.    Assessment/Plan: 42 year old female with pruritus ani. Discussed that this is likely due to leakage of moisture. Recommended a fiber supplement such as Metamucil, Citrucel, or Benefiber powder to bulk up stools. Try folding a gauze in the buttock crease and determine whether there is any fecal staining that could be contributing and to keep area dry, Calmoseptine cream or zinc cream, Avoid scratching to avoid further trauma, increase fiber and water in diet, diet modification-try avoiding spicy foods, caffeine, tomatoes, carbonated beverages, milk products, cheese, chocolate, nuts, etc., using wet wipes rather than toilet paper, try wearing only cotton underwear, and avoid perfume-containing soaps (can try Dove soap). Try these interventions for 6-8 weeks and return to clinic if symptoms persist. Patient's questions were answered to her stated satisfaction and she is in agreement with this plan.       Medical history:  Past Medical History:   Diagnosis Date     Eczema      Endometriosis       (normal spontaneous vaginal delivery)     x 2     Thyroid disease        Surgical history:  Past Surgical History:   Procedure Laterality Date     DAVINCI LAPAROSCOPIC CHOLECYSTECTOMY WITHOUT GRAMS N/A 2021    Procedure: CHOLECYSTECTOMY, ROBOT-ASSISTED, LAPAROSCOPIC;  Surgeon: Olman Goss MD;  Location:  OR     DAVINCI PELVIC PROCEDURE  2012    Procedure:DAVINCI PELVIC PROCEDURE; DAVINCI DIAGNOSTIC PELVISCOPY WITH OMNIGUIDE C02 LASER, DIAGNOSTIC HYSTEROSCOPY, EXTENSIVE LYSIS EXCISION OF ENDOMETRIOSIS, BILATERAL URETEROLYSIS, D & C; Surgeon:JOHN KELLY; Location: OR       Problem list:  Patient Active Problem List    Diagnosis Date Noted     Neck pain 2022     Priority: Medium     Biliary colic 2021     Priority: Medium     Added  automatically from request for surgery [id number]       Eczema, unspecified type 2019     Priority: Medium     Pruritic disorder 2019     Priority: Medium     Non-functioning gallbladder 2018     Priority: Medium     Gastroesophageal reflux disease without esophagitis 2017     Priority: Medium     Rhinoconjunctivitis 10/02/2017     Priority: Medium     Chronic sinusitis, unspecified location 10/02/2017     Priority: Medium     Indication for care in labor or delivery 2017     Priority: Medium     Labor and delivery, indication for care 2017     Priority: Medium     Hashimoto's thyroiditis 2016     Priority: Medium     History of multiple miscarriages 2016     Priority: Medium     Thyroid disease, antepartum 2013     Priority: Medium     Elevated TSH 2013     Priority: Medium       Medications:  Current Outpatient Medications   Medication Sig Dispense Refill     cetirizine (ZYRTEC) 10 MG tablet Take 10 mg by mouth daily as needed for allergies       levothyroxine (SYNTHROID/LEVOTHROID) 75 MCG tablet Take 1 tablet (75 mcg) by mouth daily 90 tablet 3     multivitamin, therapeutic (THERA-VIT) TABS tablet Take 1 tablet by mouth daily       VITAMIN D PO Take 2,000 Units by mouth daily          Allergies:  Allergies   Allergen Reactions     Augmentin Nausea and Vomiting     Nickel Rash       Family history:  Family History   Problem Relation Age of Onset     Hypertension Mother      Cancer Other         liver cancer     Liver Cancer Maternal Grandmother 79     Hypertension Maternal Grandfather      Heart Disease Paternal Grandmother 62     Ovarian Cancer Paternal Grandmother 92     Heart Disease Paternal Grandfather 60     Breast Cancer Paternal Aunt 59             Melanoma No family hx of      Skin Cancer No family hx of        Social history:  Social History     Tobacco Use     Smoking status: Never     Smokeless tobacco: Never   Substance Use Topics      "Alcohol use: No     Marital status: .    Nursing Notes:   Merlin Youngblood, EMT  2/2/2023  2:25 PM  Signed  Chief Complaint   Patient presents with     RECHECK       Vitals:    02/02/23 1422   BP: 115/76   BP Location: Left arm   Patient Position: Sitting   Cuff Size: Adult Large   Pulse: 85   SpO2: 100%   Weight: 155 lb 4.8 oz   Height: 5' 4\"       Body mass index is 26.66 kg/m .     Merlin Youngblood, EMT- P        20 minutes spent on the date of encounter (excluding time performing procedures) performing chart review, history and exam, documentation and further activities as noted above with an additional 2 minutes for anoscopy.     KASSANDRA TadeoC  Colon and Rectal Surgery  [facility]  "

## 2023-02-02 NOTE — NURSING NOTE
"Chief Complaint   Patient presents with     RECHECK       Vitals:    02/02/23 1422   BP: 115/76   BP Location: Left arm   Patient Position: Sitting   Cuff Size: Adult Large   Pulse: 85   SpO2: 100%   Weight: 155 lb 4.8 oz   Height: 5' 4\"       Body mass index is 26.66 kg/m .     Merlin Youngblood, EMT- P    "

## 2023-02-02 NOTE — PROGRESS NOTES
"Colon and Rectal Surgery Follow-Up Clinic Note    RE: Karen Beverly  : 1980  WALTER: 2023    Karen Beverly is a very pleasant 42 year old female here for follow-up of hemorrhoids.    Interval history: I saw Karen in 2021 with hemorrhoid prolapse with banding at that time and pruritus ani. After going to [place] at the end of last year she got diarrhea. Hemorrhoids got worse after that. Perianal area got very red. Saw her OBGYN and started a silver cream that she didn't find helpful. She is having itching and some sharp pain internally. Itching is worse at night. Using Calmosepting and Desitin and hydrocortisone and a hemorrhoid cream. Still has tissue that drops out but cannot always get it back in as the anus feels too loose. Stools are no longer hard. Taking Citrucel pills.   Colonoscopy in  was normal.    Physical Examination: Exam was chaperoned by Merlin Youngblood, EMT-P   /76 (BP Location: Left arm, Patient Position: Sitting, Cuff Size: Adult Large)   Pulse 85   Ht 5' 4\"   Wt 155 lb 4.8 oz   SpO2 100%   BMI 26.66 kg/m    General: alert, oriented, in no acute distress, sitting comfortably  HEENT:mucous membranes moist  Perianal external examination:  Perianal skin: excoriation and fissuring of perianal skin.  Lesions: No evidence of an external lesion, nodularity, or induration in the perianal region.  Eversion of buttocks: There was not evidence of an anal fissure. Details: N/A.  Skin tags or external hemorrhoids: Small skin tag in the posterior midline    Digital rectal examination: Was performed.   Sphincter tone: Good.  Palpable lesions: No.  Other: None.  Bimanual examination: was not performed.    Anoscopy: Was performed.   Hemorrhoids: No significant internal hemorrhoids.  Lesions: No.    Assessment/Plan: 42 year old female with pruritus ani. Discussed that this is likely due to leakage of moisture. Recommended a fiber supplement such as Metamucil, Citrucel, or Benefiber powder " to bulk up stools. Try folding a gauze in the buttock crease and determine whether there is any fecal staining that could be contributing and to keep area dry, Calmoseptine cream or zinc cream, Avoid scratching to avoid further trauma, increase fiber and water in diet, diet modification-try avoiding spicy foods, caffeine, tomatoes, carbonated beverages, milk products, cheese, chocolate, nuts, etc., using wet wipes rather than toilet paper, try wearing only cotton underwear, and avoid perfume-containing soaps (can try Dove soap). Try these interventions for 6-8 weeks and return to clinic if symptoms persist. Patient's questions were answered to her stated satisfaction and she is in agreement with this plan.       Medical history:  Past Medical History:   Diagnosis Date     Eczema      Endometriosis       (normal spontaneous vaginal delivery)     x 2     Thyroid disease        Surgical history:  Past Surgical History:   Procedure Laterality Date     DAVINCI LAPAROSCOPIC CHOLECYSTECTOMY WITHOUT GRAMS N/A 2021    Procedure: CHOLECYSTECTOMY, ROBOT-ASSISTED, LAPAROSCOPIC;  Surgeon: Olman Goss MD;  Location:  OR     DAVINCI PELVIC PROCEDURE  2012    Procedure:DAVINCI PELVIC PROCEDURE; DAVINCI DIAGNOSTIC PELVISCOPY WITH OMNIGUIDE C02 LASER, DIAGNOSTIC HYSTEROSCOPY, EXTENSIVE LYSIS EXCISION OF ENDOMETRIOSIS, BILATERAL URETEROLYSIS, D & C; Surgeon:JOHN KELLY; Location: OR       Problem list:  Patient Active Problem List    Diagnosis Date Noted     Neck pain 2022     Priority: Medium     Biliary colic 2021     Priority: Medium     Added automatically from request for surgery [id number]       Eczema, unspecified type 2019     Priority: Medium     Pruritic disorder 2019     Priority: Medium     Non-functioning gallbladder 2018     Priority: Medium     Gastroesophageal reflux disease without esophagitis 2017     Priority: Medium     Rhinoconjunctivitis  10/02/2017     Priority: Medium     Chronic sinusitis, unspecified location 10/02/2017     Priority: Medium     Indication for care in labor or delivery 2017     Priority: Medium     Labor and delivery, indication for care 2017     Priority: Medium     Hashimoto's thyroiditis 2016     Priority: Medium     History of multiple miscarriages 2016     Priority: Medium     Thyroid disease, antepartum 2013     Priority: Medium     Elevated TSH 2013     Priority: Medium       Medications:  Current Outpatient Medications   Medication Sig Dispense Refill     cetirizine (ZYRTEC) 10 MG tablet Take 10 mg by mouth daily as needed for allergies       levothyroxine (SYNTHROID/LEVOTHROID) 75 MCG tablet Take 1 tablet (75 mcg) by mouth daily 90 tablet 3     multivitamin, therapeutic (THERA-VIT) TABS tablet Take 1 tablet by mouth daily       VITAMIN D PO Take 2,000 Units by mouth daily          Allergies:  Allergies   Allergen Reactions     Augmentin Nausea and Vomiting     Nickel Rash       Family history:  Family History   Problem Relation Age of Onset     Hypertension Mother      Cancer Other         liver cancer     Liver Cancer Maternal Grandmother 79     Hypertension Maternal Grandfather      Heart Disease Paternal Grandmother 62     Ovarian Cancer Paternal Grandmother 92     Heart Disease Paternal Grandfather 60     Breast Cancer Paternal Aunt 59             Melanoma No family hx of      Skin Cancer No family hx of        Social history:  Social History     Tobacco Use     Smoking status: Never     Smokeless tobacco: Never   Substance Use Topics     Alcohol use: No     Marital status: .    Nursing Notes:   Merlin Youngblood, EMT  2023  2:25 PM  Signed  Chief Complaint   Patient presents with     RECHECK       Vitals:    23 1422   BP: 115/76   BP Location: Left arm   Patient Position: Sitting   Cuff Size: Adult Large   Pulse: 85   SpO2: 100%   Weight: 155 lb 4.8 oz   Height:  "5' 4\"       Body mass index is 26.66 kg/m .     Merlin Youngblood, EMT- P        20 minutes spent on the date of encounter (excluding time performing procedures) performing chart review, history and exam, documentation and further activities as noted above with an additional 2 minutes for anoscopy.     Corinna Melendez NP-C  Colon and Rectal Surgery  [facility]    "

## 2023-02-02 NOTE — PATIENT INSTRUCTIONS
GUIDE FOR PATIENTS WITH PRURITUS ANI    Pruritus ani, literally, means  itchy anus . This condition can have numerous causes, which can be as simple as hemorrhoids or as complex as uncommon infections and skin diseases. The purpose of the history, physical exam, and lab studies we have done and ordered will be to narrow the diagnostic possibilities to assure that you are getting the right treatment. As a first step, there are a number of things that need to be started and continued for the next 3-4 weeks.     1. Diet. Multiple foods can cause or worsen inflammation and itchiness around the anus. These include: coffee, tea, soda, alcohol, citrus, tomatoes, berries, and spicy or heavily spiced foods. These should be reduced or eliminated, if possible, from your diet.     2. Stop ALL creams, ointments, and topical preparations that you apply to your anus. It is very important that you inform us of all medications your are currently taking, including antibiotics, pain killers, and over-the-counter or natural medications. Occasionally (specially when the skin is very irritated), we will recommend a barrier cream, such as Calmoseptine or Desitin. Apply this 3-4 times per day.    3. We recommend daily bathing, preferably in a shower. Do not use medicated or perfumed soaps. Dove  is ideal as it is not soap and does not irritate your skin and do not use between buttocks. Washing-off the anus completely at the end of your shower is helpful to avoid any soap or shampoo residue on your skin. Also, using a hair dryer (on the cool air setting) is recommended to completely dry the perianal skin. If you have persistent leakage or a wet anus, we recommend placing a cotton ball or dry gauze and changing it as needed during the day. Loose, cotton undergarments are also recommended. Avoid recurrent and over-vigorous wiping after bowel movements. Unscented and non-medicated  wet-wipes  or  baby wipes  are preferred.    4. Start on a  fiber supplement such as Citrucel 3 times a day with 8-10 glasses of water a day. This will allow for a soft and easy bowel movement and will help  soak up  the extra moisture in your colon and rectum.    After 3-4 weeks, we will re-evaluate your response and inform you of the results of your tests. The majority of patients with an  itchy anus  will have a good response to these initial steps. Persistent itchiness will require an additional cream or ointment that may have to be continued for 1-2 months. It is not rare that patients with an  itchy anus  have recurrent symptoms over their lifetime and may require intermittent treatment with creams or ointments. Do not think of this as a  failure . Recurrent symptoms tend to be mild and respond well to brief treatments. As with other chronic conditions, many patients learn how to avoid these symptoms by modifying their diet or being proactive about early treatment; and in a sense  learn how to live with occasional itchiness or irritation .

## 2023-04-17 ENCOUNTER — OFFICE VISIT (OUTPATIENT)
Dept: SURGERY | Facility: CLINIC | Age: 43
End: 2023-04-17
Payer: COMMERCIAL

## 2023-04-17 VITALS
DIASTOLIC BLOOD PRESSURE: 77 MMHG | BODY MASS INDEX: 26.94 KG/M2 | HEART RATE: 81 BPM | SYSTOLIC BLOOD PRESSURE: 120 MMHG | OXYGEN SATURATION: 100 % | WEIGHT: 157.8 LBS | HEIGHT: 64 IN

## 2023-04-17 DIAGNOSIS — K62.5 RECTAL BLEEDING: ICD-10-CM

## 2023-04-17 DIAGNOSIS — L29.0 PRURITUS ANI: Primary | ICD-10-CM

## 2023-04-17 DIAGNOSIS — Z12.11 ENCOUNTER FOR SCREENING COLONOSCOPY: ICD-10-CM

## 2023-04-17 DIAGNOSIS — L29.9 PRURITIC DISORDER: ICD-10-CM

## 2023-04-17 PROCEDURE — 99214 OFFICE O/P EST MOD 30 MIN: CPT | Performed by: NURSE PRACTITIONER

## 2023-04-17 PROCEDURE — 88305 TISSUE EXAM BY PATHOLOGIST: CPT | Mod: TC | Performed by: NURSE PRACTITIONER

## 2023-04-17 PROCEDURE — 88305 TISSUE EXAM BY PATHOLOGIST: CPT | Mod: 26 | Performed by: PATHOLOGY

## 2023-04-17 RX ORDER — LIDOCAINE HYDROCHLORIDE AND EPINEPHRINE 10; 10 MG/ML; UG/ML
2 INJECTION, SOLUTION INFILTRATION; PERINEURAL ONCE
Status: COMPLETED | OUTPATIENT
Start: 2023-04-17 | End: 2023-04-17

## 2023-04-17 RX ADMIN — LIDOCAINE HYDROCHLORIDE AND EPINEPHRINE 2 ML: 10; 10 INJECTION, SOLUTION INFILTRATION; PERINEURAL at 11:03

## 2023-04-17 ASSESSMENT — PAIN SCALES - GENERAL: PAINLEVEL: MODERATE PAIN (5)

## 2023-04-17 NOTE — NURSING NOTE
"Chief Complaint   Patient presents with     RECHECK     Pruritis Ani       Vitals:    04/17/23 0952   BP: 120/77   BP Location: Left arm   Patient Position: Sitting   Cuff Size: Adult Regular   Pulse: 81   SpO2: 100%   Weight: 157 lb 12.8 oz   Height: 5' 4\"       Body mass index is 27.09 kg/m .     Merlin Youngblood, EMT- P    "

## 2023-04-17 NOTE — PROGRESS NOTES
"Colon and Rectal Surgery Follow-Up Clinic Note    RE: Karen Beverly  : 1980  WALTER: 2023    Karen Beverly is a very pleasant 43 year old female here for follow-up of pruritus ani.    Interval history: I saw Karen in 2021 with hemorrhoid prolapse with banding at that time and pruritus ani which has not improved. This started after a vacation in [place] when she got diarrhea. Itching has been the same. Has a pinching pain randomly. Some bright red blood with wiping. Taking Citrucel powder three times a day with a lot of water. Even if stools are soft she has pain. Itching and pain are right at the anal opening. Some sharp pain with bowel movements. Using Calmoseptine cream regularly. Using baby wipes instead of toilet paper.   Colonoscopy in  was normal.    Physical Examination: Exam was chaperoned by Merlin Youngblood, EMT-P   /77 (BP Location: Left arm, Patient Position: Sitting, Cuff Size: Adult Regular)   Pulse 81   Ht 5' 4\"   Wt 157 lb 12.8 oz   SpO2 100%   BMI 27.09 kg/m    General: alert, oriented, in no acute distress, sitting comfortably  HEENT:mucous membranes moist  Perianal external examination:  Perianal skin: Fissuring of perianal skin.  Lesions: No evidence of an external lesion, nodularity, or induration in the perianal region.  Eversion of buttocks: There was not evidence of an anal fissure. Details: N/A.  Skin tags or external hemorrhoids: Verruciform appearing lesion in the posterior midline. Some hyperpigmentation at the posterior vaginal introitus.    Digital rectal examination: Was deferred    Anoscopy: Was deferred    Assessment/Plan: 43 year old female with persistent pruritus ani. Fissuring of the perianal skin, which is likely the source of the bleeding. Is due for a colonoscopy this year so will place referral for this also. Will try a topical antifungal and continued fiber use. If pruritus persists, will try topical [place]'s. I took a biopsy and fulgurated lesion " at the anal verge that appears to be a skin tag versus condyloma. If condyloma, would need wart check every 6 months. Asked her to see her gynecologist for the hyperpigmentation at the vaginal introitus. Patient's questions were answered to her stated satisfaction and she is in agreement with this plan.     Medical history:  Past Medical History:   Diagnosis Date     Eczema      Endometriosis       (normal spontaneous vaginal delivery)     x 2     Thyroid disease        Surgical history:  Past Surgical History:   Procedure Laterality Date     DAVINCI LAPAROSCOPIC CHOLECYSTECTOMY WITHOUT GRAMS N/A 2021    Procedure: CHOLECYSTECTOMY, ROBOT-ASSISTED, LAPAROSCOPIC;  Surgeon: Olman Goss MD;  Location: [facility] OR     DAVINCI PELVIC PROCEDURE  2012    Procedure:DAVINCI PELVIC PROCEDURE; DAVINCI DIAGNOSTIC PELVISCOPY WITH OMNIGUIDE C02 LASER, DIAGNOSTIC HYSTEROSCOPY, EXTENSIVE LYSIS EXCISION OF ENDOMETRIOSIS, BILATERAL URETEROLYSIS, D & C; Surgeon:JOHN KELLY; Location: OR       Problem list:    Patient Active Problem List    Diagnosis Date Noted     Neck pain 2022     Priority: Medium     Biliary colic 2021     Priority: Medium     Added automatically from request for surgery [id number]       Eczema, unspecified type 2019     Priority: Medium     Pruritic disorder 2019     Priority: Medium     Non-functioning gallbladder 2018     Priority: Medium     Gastroesophageal reflux disease without esophagitis 2017     Priority: Medium     Rhinoconjunctivitis 10/02/2017     Priority: Medium     Chronic sinusitis, unspecified location 10/02/2017     Priority: Medium     Indication for care in labor or delivery 2017     Priority: Medium     Labor and delivery, indication for care 2017     Priority: Medium     Hashimoto's thyroiditis 2016     Priority: Medium     History of multiple miscarriages 2016     Priority: Medium     Thyroid disease,  "antepartum 2013     Priority: Medium     Elevated TSH 2013     Priority: Medium       Medications:  Current Outpatient Medications   Medication Sig Dispense Refill     cetirizine (ZYRTEC) 10 MG tablet Take 10 mg by mouth daily as needed for allergies       levothyroxine (SYNTHROID/LEVOTHROID) 75 MCG tablet Take 1 tablet (75 mcg) by mouth daily 90 tablet 3     multivitamin, therapeutic (THERA-VIT) TABS tablet Take 1 tablet by mouth daily       VITAMIN D PO Take 2,000 Units by mouth daily          Allergies:  Allergies   Allergen Reactions     Augmentin Nausea and Vomiting     Nickel Rash       Family history:  Family History   Problem Relation Age of Onset     Hypertension Mother      Cancer Other         liver cancer     Liver Cancer Maternal Grandmother 79     Hypertension Maternal Grandfather      Heart Disease Paternal Grandmother 62     Ovarian Cancer Paternal Grandmother 92     Heart Disease Paternal Grandfather 60     Breast Cancer Paternal Aunt 59             Melanoma No family hx of      Skin Cancer No family hx of        Social history:  Social History     Tobacco Use     Smoking status: Never     Smokeless tobacco: Never   Vaping Use     Vaping status: Never Used     Passive vaping exposure: Yes   Substance Use Topics     Alcohol use: No     Marital status: .    Nursing Notes:   Merlin Youngblood, EMT  2023  9:55 AM  Signed  Chief Complaint   Patient presents with     RECHECK     Pruritis Ani       Vitals:    23 0952   BP: 120/77   BP Location: Left arm   Patient Position: Sitting   Cuff Size: Adult Regular   Pulse: 81   SpO2: 100%   Weight: 157 lb 12.8 oz   Height: 5' 4\"       Body mass index is 27.09 kg/m .     Merlin Youngblood, EMT- P        20 minutes spent on the date of encounter (excluding time performing procedures) performing chart review, history and exam, documentation and further activities as noted above with an additional 2 minutes for anoscopy.     Corinna chew" Marwitz, NP-C  Colon and Rectal Surgery  [facility]

## 2023-04-17 NOTE — LETTER
"2023       RE: Karen Beverly  [address]  [place] [address]     Dear Colleague,    Thank you for referring your patient, Karen Beverly, to the [facility] [place] [facility]. Please see a copy of my visit note below.    Colon and Rectal Surgery Follow-Up Clinic Note    RE: Karen Beverly  : 1980  WALTER: 2023    Karen Beverly is a very pleasant 43 year old female here for follow-up of pruritus ani.    Interval history: I saw Karen in 2021 with hemorrhoid prolapse with banding at that time and pruritus ani which has not improved. This started after a vacation in [place] when she got diarrhea. Itching has been the same. Has a pinching pain randomly. Some bright red blood with wiping. Taking Citrucel powder three times a day with a lot of water. Even if stools are soft she has pain. Itching and pain are right at the anal opening. Some sharp pain with bowel movements. Using Calmoseptine cream regularly. Using baby wipes instead of toilet paper.   Colonoscopy in  was normal.    Physical Examination: Exam was chaperoned by Merlin Youngblood, EMT-P   /77 (BP Location: Left arm, Patient Position: Sitting, Cuff Size: Adult Regular)   Pulse 81   Ht 5' 4\"   Wt 157 lb 12.8 oz   SpO2 100%   BMI 27.09 kg/m    General: alert, oriented, in no acute distress, sitting comfortably  HEENT:mucous membranes moist  Perianal external examination:  Perianal skin: Fissuring of perianal skin.  Lesions: No evidence of an external lesion, nodularity, or induration in the perianal region.  Eversion of buttocks: There was not evidence of an anal fissure. Details: N/A.  Skin tags or external hemorrhoids: Verruciform appearing lesion in the posterior midline. Some hyperpigmentation at the posterior vaginal introitus.    Digital rectal examination: Was deferred    Anoscopy: Was deferred    Assessment/Plan: 43 year old " female with persistent pruritus ani. Fissuring of the perianal skin, which is likely the source of the bleeding. Is due for a colonoscopy this year so will place referral for this also. Will try a topical antifungal and continued fiber use. If pruritus persists, will try topical [place]'s. I took a biopsy and fulgurated lesion at the anal verge that appears to be a skin tag versus condyloma. If condyloma, would need wart check every 6 months. Asked her to see her gynecologist for the hyperpigmentation at the vaginal introitus. Patient's questions were answered to her stated satisfaction and she is in agreement with this plan.     Medical history:  Past Medical History:   Diagnosis Date    Eczema     Endometriosis      (normal spontaneous vaginal delivery)     x 2    Thyroid disease        Surgical history:  Past Surgical History:   Procedure Laterality Date    DAVINCI LAPAROSCOPIC CHOLECYSTECTOMY WITHOUT GRAMS N/A 2021    Procedure: CHOLECYSTECTOMY, ROBOT-ASSISTED, LAPAROSCOPIC;  Surgeon: Olman Goss MD;  Location: [facility] OR    DAVINCI PELVIC PROCEDURE  2012    Procedure:DAVINCI PELVIC PROCEDURE; DAVINCI DIAGNOSTIC PELVISCOPY WITH OMNIGUIDE C02 LASER, DIAGNOSTIC HYSTEROSCOPY, EXTENSIVE LYSIS EXCISION OF ENDOMETRIOSIS, BILATERAL URETEROLYSIS, D & C; Surgeon:JOHN KELLY; Location: OR       Problem list:    Patient Active Problem List    Diagnosis Date Noted    Neck pain 2022     Priority: Medium    Biliary colic 2021     Priority: Medium     Added automatically from request for surgery [id number]      Eczema, unspecified type 2019     Priority: Medium    Pruritic disorder 2019     Priority: Medium    Non-functioning gallbladder 2018     Priority: Medium    Gastroesophageal reflux disease without esophagitis 2017     Priority: Medium    Rhinoconjunctivitis 10/02/2017     Priority: Medium    Chronic sinusitis, unspecified location 10/02/2017     Priority:  "Medium    Indication for care in labor or delivery 2017     Priority: Medium    Labor and delivery, indication for care 2017     Priority: Medium    Hashimoto's thyroiditis 2016     Priority: Medium    History of multiple miscarriages 2016     Priority: Medium    Thyroid disease, antepartum 2013     Priority: Medium    Elevated TSH 2013     Priority: Medium       Medications:  Current Outpatient Medications   Medication Sig Dispense Refill    cetirizine (ZYRTEC) 10 MG tablet Take 10 mg by mouth daily as needed for allergies      levothyroxine (SYNTHROID/LEVOTHROID) 75 MCG tablet Take 1 tablet (75 mcg) by mouth daily 90 tablet 3    multivitamin, therapeutic (THERA-VIT) TABS tablet Take 1 tablet by mouth daily      VITAMIN D PO Take 2,000 Units by mouth daily          Allergies:  Allergies   Allergen Reactions    Augmentin Nausea and Vomiting    Nickel Rash       Family history:  Family History   Problem Relation Age of Onset    Hypertension Mother     Cancer Other         liver cancer    Liver Cancer Maternal Grandmother 79    Hypertension Maternal Grandfather     Heart Disease Paternal Grandmother 62    Ovarian Cancer Paternal Grandmother 92    Heart Disease Paternal Grandfather 60    Breast Cancer Paternal Aunt 59            Melanoma No family hx of     Skin Cancer No family hx of        Social history:  Social History     Tobacco Use    Smoking status: Never    Smokeless tobacco: Never   Vaping Use    Vaping status: Never Used     Passive vaping exposure: Yes   Substance Use Topics    Alcohol use: No     Marital status: .    Nursing Notes:   Merlin Youngblood, EMT  2023  9:55 AM  Signed  Chief Complaint   Patient presents with    RECHECK     Pruritis Ani       Vitals:    23 0952   BP: 120/77   BP Location: Left arm   Patient Position: Sitting   Cuff Size: Adult Regular   Pulse: 81   SpO2: 100%   Weight: 157 lb 12.8 oz   Height: 5' 4\"       Body mass index is " 27.09 kg/m .     Merlin Youngblood, EMT- P        20 minutes spent on the date of encounter (excluding time performing procedures) performing chart review, history and exam, documentation and further activities as noted above with an additional 2 minutes for anoscopy.         Again, thank you for allowing me to participate in the care of your patient.      Sincerely,    ULICES Walker CNP

## 2023-04-18 LAB
PATH REPORT.COMMENTS IMP SPEC: NORMAL
PATH REPORT.COMMENTS IMP SPEC: NORMAL
PATH REPORT.FINAL DX SPEC: NORMAL
PATH REPORT.GROSS SPEC: NORMAL
PATH REPORT.MICROSCOPIC SPEC OTHER STN: NORMAL
PATH REPORT.RELEVANT HX SPEC: NORMAL
PHOTO IMAGE: NORMAL

## 2023-04-24 ENCOUNTER — TELEPHONE (OUTPATIENT)
Dept: GASTROENTEROLOGY | Facility: CLINIC | Age: 43
End: 2023-04-24
Payer: COMMERCIAL

## 2023-04-24 NOTE — TELEPHONE ENCOUNTER
Screening Questions  BLUE  KIND OF PREP RED  LOCATION [review exclusion criteria] GREEN  SEDATION TYPE        Y Are you active on mychart?       EMELY MATIAS Ordering/Referring Provider?        MEDICA What type of coverage do you have?      N Have you had a positive covid test in the last 14 days?     26.9 1. BMI  [BMI 40+ - review exclusion criteria& smart-phrase document]    Y  2. Are you able to give consent for your medical care? [IF NO,RN REVIEW]          N  3. Are you taking any prescription pain medications on a routine schedule   (ex narcotics: oxycodone, roxicodone, oxycontin,  and percocet)? [RN Review]          3a. EXTENDED PREP What kind of prescription?     N 4. Do you have any chemical dependencies such as alcohol, street drugs, or methadone?        **If yes 3- 5 , please schedule with MAC sedation.**          IF YES TO ANY 6 - 10 - HOSPITAL SETTING ONLY.     N 6.   Do you need assistance transferring?     N 7.   Have you had a heart or lung transplant?    N 8.   Are you currently on dialysis?   N 9.   Do you use daily home oxygen?   N 10. Do you take nitroglycerin?   10a.  If yes, how often?     11. [FEMALES]  N Are you currently pregnant?    11a.  If yes, how many weeks? [ Greater than 12 weeks, OR NEEDED]    N 12. Do you have Pulmonary Hypertension? *NEED PAC APPT AT [facility] w/ MAC*     N 13. [review exclusion criteria]  Do you have any implantable devices in your body (pacemaker, defib, LVAD)?    N 14. In the past 6 months, have you had any heart related issues including cardiomyopathy or heart attack?     14a.  If yes, did it require cardiac stenting if so when?     N 15. Have you had a stroke or Transient ischemic attack (TIA - aka  mini stroke ) within 6 months?      N 16. Do you have mod to severe Obstructive Sleep Apnea?  [Hospital only]    N 17. Do you have SEVERE AND UNCONTROLLED asthma? *NEED PAC APPT AT [facility] w/MAC*     18. Are you currently taking any blood thinners?     " 18a. No. Continue to 19.   18b.    N 19. Do you take the medication Phentermine?    19a. If yes, \"Hold for 7 days before procedure.  Please consult your prescribing provider if you have questions about holding this medication.\"     N  20. Do you have chronic kidney disease?      N  21. Do you have a diagnosis of diabetes?     N  22. On a regular basis do you go 3-5 days between bowel movements?      23. Preferred LOCAL Pharmacy for Pre Prescription    [ LIST ONLY ONE PHARMACY]        [facility] [id number] [place] [facility] [address]        - CLOSING REMINDERS -    Informed patient they will need an adult    Cannot take any type of public or medical transportation alone    Conscious Sedation- Needs  for 6 hours after the procedure       MAC/General-Needs  for 24 hours after procedure    Pre-Procedure Covid test to be completed [[facility] PCR Testing Required]    Confirmed Nurse will call to complete assessment       - SCHEDULING DETAILS -  N Hospital Setting Required? If yes, what is the exclusion?:    [facility]  Surgeon    5/18  Date of Procedure  Lower Endoscopy [Colonoscopy]  Type of Procedure Scheduled   Location   [facility] WITHOUT MAGNEISUM [facility] Which Colonoscopy Prep was Sent?     CS Sedation Type     N PAC / Pre-op Required                 "

## 2023-04-25 ENCOUNTER — LAB REQUISITION (OUTPATIENT)
Dept: LAB | Facility: CLINIC | Age: 43
End: 2023-04-25

## 2023-04-25 DIAGNOSIS — N90.89 OTHER SPECIFIED NONINFLAMMATORY DISORDERS OF VULVA AND PERINEUM: ICD-10-CM

## 2023-04-25 PROCEDURE — 88305 TISSUE EXAM BY PATHOLOGIST: CPT | Performed by: PATHOLOGY

## 2023-05-01 LAB
PATH REPORT.COMMENTS IMP SPEC: NORMAL
PATH REPORT.FINAL DX SPEC: NORMAL
PATH REPORT.GROSS SPEC: NORMAL
PATH REPORT.MICROSCOPIC SPEC OTHER STN: NORMAL
PATH REPORT.MICROSCOPIC SPEC OTHER STN: NORMAL
PATH REPORT.RELEVANT HX SPEC: NORMAL
PHOTO IMAGE: NORMAL

## 2023-05-18 ENCOUNTER — HOSPITAL ENCOUNTER (OUTPATIENT)
Facility: CLINIC | Age: 43
Discharge: HOME | End: 2023-05-18
Attending: COLON & RECTAL SURGERY | Admitting: COLON & RECTAL SURGERY
Payer: COMMERCIAL

## 2023-05-18 VITALS
BODY MASS INDEX: 25.61 KG/M2 | RESPIRATION RATE: 30 BRPM | SYSTOLIC BLOOD PRESSURE: 109 MMHG | OXYGEN SATURATION: 100 % | HEIGHT: 64 IN | DIASTOLIC BLOOD PRESSURE: 70 MMHG | HEART RATE: 69 BPM | WEIGHT: 150 LBS

## 2023-05-18 LAB — COLONOSCOPY: NORMAL

## 2023-05-18 PROCEDURE — G0121 COLON CA SCRN NOT HI RSK IND: HCPCS | Performed by: COLON & RECTAL SURGERY

## 2023-05-18 PROCEDURE — 250N000011 HC RX IP 250 OP 636: Performed by: COLON & RECTAL SURGERY

## 2023-05-18 PROCEDURE — G0500 MOD SEDAT ENDO SERVICE >5YRS: HCPCS | Performed by: COLON & RECTAL SURGERY

## 2023-05-18 PROCEDURE — 45378 DIAGNOSTIC COLONOSCOPY: CPT | Performed by: COLON & RECTAL SURGERY

## 2023-05-18 RX ORDER — ONDANSETRON 2 MG/ML
4 INJECTION INTRAMUSCULAR; INTRAVENOUS
Status: DISCONTINUED | OUTPATIENT
Start: 2023-05-18 | End: 2023-05-18 | Stop reason: HOSPADM

## 2023-05-18 RX ORDER — FENTANYL CITRATE 50 UG/ML
INJECTION, SOLUTION INTRAMUSCULAR; INTRAVENOUS PRN
Status: DISCONTINUED | OUTPATIENT
Start: 2023-05-18 | End: 2023-05-18 | Stop reason: HOSPADM

## 2023-05-18 RX ORDER — LIDOCAINE 40 MG/G
CREAM TOPICAL
Status: DISCONTINUED | OUTPATIENT
Start: 2023-05-18 | End: 2023-05-18 | Stop reason: HOSPADM

## 2023-05-18 ASSESSMENT — ACTIVITIES OF DAILY LIVING (ADL)
ADLS_ACUITY_SCORE: 33
ADLS_ACUITY_SCORE: 35

## 2023-05-18 NOTE — H&P
Colon & Rectal Surgery History and Physical  Pre-Endoscopy Procedure Note    History of Present Illness   I have been asked by Dr. Burt to evaluate this 43 year old female for colorectal cancer screening. She currently denies any abdominal pain, weight loss, bleeding per rectum, or recent change in bowel habits.    Past Medical History  Diagnosis Date     Eczema      Endometriosis       (normal spontaneous vaginal delivery)     x 2     Thyroid disease        Past Surgical History  Procedure Laterality Date     DAVINCI LAPAROSCOPIC CHOLECYSTECTOMY WITHOUT GRAMS N/A 2021    Procedure: CHOLECYSTECTOMY, ROBOT-ASSISTED, LAPAROSCOPIC;  Surgeon: Olman Goss MD;  Location: [facility] OR     DAVINCI PELVIC PROCEDURE  2012    Procedure:DAVINCI PELVIC PROCEDURE; DAVINCI DIAGNOSTIC PELVISCOPY WITH OMNIGUIDE C02 LASER, DIAGNOSTIC HYSTEROSCOPY, EXTENSIVE LYSIS EXCISION OF ENDOMETRIOSIS, BILATERAL URETEROLYSIS, D & C; Surgeon:JOHN KELLY; Location: OR        Medications  Medication Sig     cetirizine (ZYRTEC) 10 MG tablet Take 10 mg by mouth daily as needed for allergies     levothyroxine (SYNTHROID/LEVOTHROID) 75 MCG tablet Take 1 tablet (75 mcg) by mouth daily       Allergies  Allergen Reactions     Amoxicillin-Pot Clavulanate Nausea and Vomiting     Nickel Rash        Family History   Family history includes Breast Cancer (age of onset: 59) in her paternal aunt; Cancer in an other family member; Heart Disease (age of onset: 60) in her paternal grandfather; Heart Disease (age of onset: 62) in her paternal grandmother; Hypertension in her maternal grandfather and mother; Liver Cancer (age of onset: 79) in her maternal grandmother; Ovarian Cancer (age of onset: 92) in her paternal grandmother.     Social History   She reports that she has never smoked. She has never used smokeless tobacco. She reports that she does not drink alcohol and does not use drugs.    Review of Systems   Constitutional:  No  "fever, weight change or fatigue.    Eyes:     No dry eyes or vision changes.   Ears/Nose/Throat/Neck:  No oral ulcers, sore throat or voice change.    Cardiovascular:   No palpitations, syncope, angina or edema.   Respiratory:    No chest pain, excessive sleepiness, shortness of breath or hemoptysis.    Gastrointestinal:   No abdominal pain, nausea, vomiting, diarrhea or heartburn.    Genitourinary:   No dysuria, hematuria, urinary retention or urinary frequency.   Musculoskeletal:  No joint swelling or arthralgias.    Dermatologic:  No skin rash or other skin changes.   Neurologic:    No focal weakness or numbness. No neuropathy.   Psychiatric:    No depression, anxiety, suicidal ideation, or paranoid ideation.   Endocrine:   No cold or heat intolerance, polydipsia, hirsutism, change in libido, or flushing.   Hematology/Lymphatic:  No bleeding or lymphadenopathy.    Allergy/Immunology:  No rhinitis or hives.     Physical Exam   Vitals:  /72 HR 64 RR 12 SpO2 100% room air Height 1.626 m (5' 4\"), weight 68 kg (150 lb), not currently breastfeeding.    General:  Alert and oriented to person, place and time   Airway: Normal oropharyngeal airway and neck mobility   Lungs:  Clear bilaterally   Heart:  Regular rate and rhythm   Abdomen: Soft, NT, ND, no masses   Extremities: Warm, good capillary refill    ASA Grade: II (mild systemic disease)    Impression: Cleared for use of conscious sedation for colorectal cancer screening    Plan: Proceed with colonoscopy     vAril Serra MD  [facility]  [phone]  "

## 2023-05-22 ENCOUNTER — OFFICE VISIT (OUTPATIENT)
Dept: SURGERY | Facility: CLINIC | Age: 43
End: 2023-05-22
Payer: COMMERCIAL

## 2023-05-22 VITALS
OXYGEN SATURATION: 100 % | SYSTOLIC BLOOD PRESSURE: 101 MMHG | WEIGHT: 150 LBS | DIASTOLIC BLOOD PRESSURE: 67 MMHG | HEART RATE: 82 BPM | HEIGHT: 64 IN | BODY MASS INDEX: 25.61 KG/M2

## 2023-05-22 DIAGNOSIS — L29.0 PRURITUS ANI: Primary | ICD-10-CM

## 2023-05-22 PROCEDURE — 99213 OFFICE O/P EST LOW 20 MIN: CPT | Performed by: NURSE PRACTITIONER

## 2023-05-22 ASSESSMENT — PAIN SCALES - GENERAL: PAINLEVEL: NO PAIN (0)

## 2023-05-22 NOTE — NURSING NOTE
"Chief Complaint   Patient presents with     JACQUE Arvizu's application       Vitals:    05/22/23 1126   BP: 101/67   BP Location: Left arm   Patient Position: Sitting   Cuff Size: Adult Regular   Pulse: 82   SpO2: 100%   Weight: 150 lb   Height: 5' 4\"       Body mass index is 25.75 kg/m .     Merlin Youngblood, EMT- P    "

## 2023-05-22 NOTE — PROGRESS NOTES
"Colon and Rectal Surgery Follow-Up Clinic Note    RE: Karen Beverly  : 1980  WALTER: 2023    Karen Beverly is a very pleasant 43 year old female here for follow-up of pruritus ani.    Interval history: I have seen Karen in the past with hemorrhoid prolapse and pruritus ani. Perianal biopsy has been normal. She reports that since the biopsy that her itching has been much better, however. She remains on a fiber supplement and uses Calmoseptine. She is here to try [place]'s application.    Physical Examination: Exam was chaperoned by Merlin Youngblood, EMT-P   /67 (BP Location: Left arm, Patient Position: Sitting, Cuff Size: Adult Regular)   Pulse 82   Ht 5' 4\"   Wt 150 lb   SpO2 100%   BMI 25.75 kg/m    General: alert, oriented, in no acute distress, sitting comfortably  HEENT:mucous membranes moist  Perianal external examination:  Perianal skin: mild excoriation of the perianal skin. [place]'s solution applied using cotton tip applicator and sealed with benzoin tincture. She tolerated this well.    Digital rectal examination: Was deferred.    Anoscopy: Was deferred.    Assessment/Plan: 43 year old female with pruritus ani. This has improved but still has intermittent itching. Continue on twice daily fiber supplement and zinc barrier cream. [place]'s applied today. If she finds this helpful, can re apply anytime after a few weeks if symptoms persist. Patient's questions were answered to her stated satisfaction and she is in agreement with this plan.     Medical history:  Past Medical History:   Diagnosis Date     Eczema      Endometriosis       (normal spontaneous vaginal delivery)     x 2     Thyroid disease        Surgical history:  Past Surgical History:   Procedure Laterality Date     COLONOSCOPY N/A 2023    Procedure: Colonoscopy;  Surgeon: Avril Serra MD;  Location:  GI     DAVINCI LAPAROSCOPIC CHOLECYSTECTOMY WITHOUT GRAMS N/A 2021    Procedure: CHOLECYSTECTOMY, " ROBOT-ASSISTED, LAPAROSCOPIC;  Surgeon: Olman Goss MD;  Location: [facility] OR     DAVINCI PELVIC PROCEDURE  2/22/2012    Procedure:DAVINCI PELVIC PROCEDURE; DAVINCI DIAGNOSTIC PELVISCOPY WITH OMNIGUIDE C02 LASER, DIAGNOSTIC HYSTEROSCOPY, EXTENSIVE LYSIS EXCISION OF ENDOMETRIOSIS, BILATERAL URETEROLYSIS, D & C; Surgeon:JOHN KELLY; Location: OR       Problem list:  Patient Active Problem List    Diagnosis Date Noted     Neck pain 09/22/2022     Priority: Medium     Biliary colic 01/18/2021     Priority: Medium     Added automatically from request for surgery [id number]       Eczema, unspecified type 02/18/2019     Priority: Medium     Pruritic disorder 02/18/2019     Priority: Medium     Non-functioning gallbladder 12/23/2018     Priority: Medium     Gastroesophageal reflux disease without esophagitis 12/13/2017     Priority: Medium     Rhinoconjunctivitis 10/02/2017     Priority: Medium     Chronic sinusitis, unspecified location 10/02/2017     Priority: Medium     Indication for care in labor or delivery 03/14/2017     Priority: Medium     Labor and delivery, indication for care 03/14/2017     Priority: Medium     Hashimoto's thyroiditis 12/24/2016     Priority: Medium     History of multiple miscarriages 03/01/2016     Priority: Medium     Thyroid disease, antepartum 09/19/2013     Priority: Medium     Elevated TSH 09/08/2013     Priority: Medium       Medications:  Current Outpatient Medications   Medication Sig Dispense Refill     cetirizine (ZYRTEC) 10 MG tablet Take 10 mg by mouth daily as needed for allergies       COMPOUNDED NON-CONTROLLED SUBSTANCE (CMPD RX) - PHARMACY TO MIX COMPOUNDED MEDICATION Follow up in clinic for application by provider 60 mL 0     levothyroxine (SYNTHROID/LEVOTHROID) 75 MCG tablet Take 1 tablet (75 mcg) by mouth daily 90 tablet 3       Allergies:  Allergies   Allergen Reactions     Amoxicillin-Pot Clavulanate Nausea and Vomiting     Nickel Rash       Family  "history:  Family History   Problem Relation Age of Onset     Hypertension Mother      Cancer Other         liver cancer     Liver Cancer Maternal Grandmother 79     Hypertension Maternal Grandfather      Heart Disease Paternal Grandmother 62     Ovarian Cancer Paternal Grandmother 92     Heart Disease Paternal Grandfather 60     Breast Cancer Paternal Aunt 59             Melanoma No family hx of      Skin Cancer No family hx of        Social history:  Social History     Tobacco Use     Smoking status: Never     Smokeless tobacco: Never   Vaping Use     Vaping status: Never Used     Passive vaping exposure: Yes   Substance Use Topics     Alcohol use: No     Marital status: .    Nursing Notes:   Merlin Youngblood, EMT  2023 11:28 AM  Signed  Chief Complaint   Patient presents with     JACQUE Arvizu's application       Vitals:    23 1126   BP: 101/67   BP Location: Left arm   Patient Position: Sitting   Cuff Size: Adult Regular   Pulse: 82   SpO2: 100%   Weight: 150 lb   Height: 5' 4\"       Body mass index is 25.75 kg/m .     Merlin Youngblood, EMT- P        10 minutes spent on the date of encounter performing chart review, history and exam, documentation and further activities as noted above    Corinna Melendez NP-RALPH  Colon and Rectal Surgery  [facility]    "

## 2023-05-22 NOTE — LETTER
"2023       RE: Karen Beverly  [address]  [place] [address]       Dear Colleague,    Thank you for referring your patient, Karen Beverly, to the [facility] [place] at [facility]. Please see a copy of my visit note below.    Colon and Rectal Surgery Follow-Up Clinic Note    RE: Karen Beverly  : 1980  WALTER: 2023    Karen Beverly is a very pleasant 43 year old female here for follow-up of pruritus ani.    Interval history: I have seen Karen in the past with hemorrhoid prolapse and pruritus ani. Perianal biopsy has been normal. She reports that since the biopsy that her itching has been much better, however. She remains on a fiber supplement and uses Calmoseptine. She is here to try [place]'s application.    Physical Examination: Exam was chaperoned by Merlin Youngblood, EMT-P   /67 (BP Location: Left arm, Patient Position: Sitting, Cuff Size: Adult Regular)   Pulse 82   Ht 5' 4\"   Wt 150 lb   SpO2 100%   BMI 25.75 kg/m    General: alert, oriented, in no acute distress, sitting comfortably  HEENT:mucous membranes moist  Perianal external examination:  Perianal skin: mild excoriation of the perianal skin. [place]'s solution applied using cotton tip applicator and sealed with benzoin tincture. She tolerated this well.    Digital rectal examination: Was deferred.    Anoscopy: Was deferred.    Assessment/Plan: 43 year old female with pruritus ani. This has improved but still has intermittent itching. Continue on twice daily fiber supplement and zinc barrier cream. [place]'s applied today. If she finds this helpful, can re apply anytime after a few weeks if symptoms persist. Patient's questions were answered to her stated satisfaction and she is in agreement with this plan.     Medical history:  Past Medical History:   Diagnosis Date    Eczema     Endometriosis      (normal spontaneous vaginal " delivery)     x 2    Thyroid disease        Surgical history:  Past Surgical History:   Procedure Laterality Date    COLONOSCOPY N/A 5/18/2023    Procedure: Colonoscopy;  Surgeon: Avril Serra MD;  Location:  GI    DAVINCI LAPAROSCOPIC CHOLECYSTECTOMY WITHOUT GRAMS N/A 2/5/2021    Procedure: CHOLECYSTECTOMY, ROBOT-ASSISTED, LAPAROSCOPIC;  Surgeon: Olman Goss MD;  Location: [facility] OR    DAVINCI PELVIC PROCEDURE  2/22/2012    Procedure:DAVINCI PELVIC PROCEDURE; DAVINCI DIAGNOSTIC PELVISCOPY WITH OMNIGUIDE C02 LASER, DIAGNOSTIC HYSTEROSCOPY, EXTENSIVE LYSIS EXCISION OF ENDOMETRIOSIS, BILATERAL URETEROLYSIS, D & C; Surgeon:JOHN KELLY; Location: OR       Problem list:  Patient Active Problem List    Diagnosis Date Noted    Neck pain 09/22/2022     Priority: Medium    Biliary colic 01/18/2021     Priority: Medium     Added automatically from request for surgery [id number]      Eczema, unspecified type 02/18/2019     Priority: Medium    Pruritic disorder 02/18/2019     Priority: Medium    Non-functioning gallbladder 12/23/2018     Priority: Medium    Gastroesophageal reflux disease without esophagitis 12/13/2017     Priority: Medium    Rhinoconjunctivitis 10/02/2017     Priority: Medium    Chronic sinusitis, unspecified location 10/02/2017     Priority: Medium    Indication for care in labor or delivery 03/14/2017     Priority: Medium    Labor and delivery, indication for care 03/14/2017     Priority: Medium    Hashimoto's thyroiditis 12/24/2016     Priority: Medium    History of multiple miscarriages 03/01/2016     Priority: Medium    Thyroid disease, antepartum 09/19/2013     Priority: Medium    Elevated TSH 09/08/2013     Priority: Medium       Medications:  Current Outpatient Medications   Medication Sig Dispense Refill    cetirizine (ZYRTEC) 10 MG tablet Take 10 mg by mouth daily as needed for allergies      COMPOUNDED NON-CONTROLLED SUBSTANCE (CMPD RX) - PHARMACY TO MIX COMPOUNDED  "MEDICATION Follow up in clinic for application by provider 60 mL 0    levothyroxine (SYNTHROID/LEVOTHROID) 75 MCG tablet Take 1 tablet (75 mcg) by mouth daily 90 tablet 3       Allergies:  Allergies   Allergen Reactions    Amoxicillin-Pot Clavulanate Nausea and Vomiting    Nickel Rash       Family history:  Family History   Problem Relation Age of Onset    Hypertension Mother     Cancer Other         liver cancer    Liver Cancer Maternal Grandmother 79    Hypertension Maternal Grandfather     Heart Disease Paternal Grandmother 62    Ovarian Cancer Paternal Grandmother 92    Heart Disease Paternal Grandfather 60    Breast Cancer Paternal Aunt 59            Melanoma No family hx of     Skin Cancer No family hx of        Social history:  Social History     Tobacco Use    Smoking status: Never    Smokeless tobacco: Never   Vaping Use    Vaping status: Never Used     Passive vaping exposure: Yes   Substance Use Topics    Alcohol use: No     Marital status: .    Nursing Notes:   Merlin Youngblood, EMT  2023 11:28 AM  Signed  Chief Complaint   Patient presents with    RECHECK     [place]'s application       Vitals:    23 1126   BP: 101/67   BP Location: Left arm   Patient Position: Sitting   Cuff Size: Adult Regular   Pulse: 82   SpO2: 100%   Weight: 150 lb   Height: 5' 4\"       Body mass index is 25.75 kg/m .     Merlin Youngblood, EMT- P    10 minutes spent on the date of encounter performing chart review, history and exam, documentation and further activities as noted above        Again, thank you for allowing me to participate in the care of your patient.      Sincerely,    Corinna Prado, ULICES CNP      "

## 2023-07-26 ASSESSMENT — ENCOUNTER SYMPTOMS
SWOLLEN GLANDS: 0
BRUISES/BLEEDS EASILY: 0
BREAST PAIN: 0
BREAST MASS: 0

## 2023-08-01 NOTE — PROGRESS NOTES
[facility]  Primary Care Center   Lars Burt MD  8/2/23      Chief Complaint:   Physical     History of Present Illness:   Karen Beverly is a 43 year old  with a history of endometriosis, Hashimoto's thyroiditis, and non-functioning gallbladder s/p laparoscopic cholecystectomy who presents for physical.    She has several other concerns and was advised on split billing.  Mood   Stable at this time.  Worried about her family when they are ill.  Eczema  Her eczema is currently stable.   GI update, Cholecystectomy, Perlita anal itching  She completed laparoscopic cholecystectomy. Liver hemangioma has been stable throughout imaging. COmpleted colonoscopy  Occasional GERD relieved with tums OK to use pepcid if needed.  She noted when traveling to [place] she was not eating avocados and her rectal itching resolved therefore she is wondering if she has a sensitivity to avocados and plans to avoid Avocado as it was wonderful not to have rectal itching.  Covid  Covid in 12/2022 very sick.  She did not call for Paxlovid.  SH: In [place] approximately 1 month ago, she was sick, she was very ill, coughing and her children were ill with fever, her blood test showed anemia.  She would like a recheck for anemia..  Children are learning Polish at home.  Social History     Social History Narrative     2009  daughter Maday son Schuyler. She lived in [place]. [place] accident when she was 6.      Healthcare Maintenance   Pap (females age 21 - 65): every 3 years, every 5 years after age 35 if cotesting done -   PAP 2/2019 NIL hpv neg next in 2 /2024, history of one slightly abnormal pap in past 2/1/2016 ASCUS  Mammogram 10/2022 annual mammography.  Glucose: every 5 years, yearly if risk factors - recommend  Lipid panel: every 5 years, yearly if risk factors - up to date  HIV (ages 13 - 64): once per lifetime, yearly if MSM or other risk factors - Up to Date 2012  Immunizations yearly influenza, covid booster. Hep  B?  Immunization History   Administered Date(s) Administered    TDAP Vaccine (Boostrix) 01/05/2017     Walking every day 5 miles.  Review of Systems:   Pertinent items are noted in HPI and below, remainder of complete ROS is negative.    Problem list, PMH, Surgical HX, FH, SH, allergies, medications,immunizations reviewed and updated in Epic.  ROS negative other than noted in HPI and ROS.  Answers submitted by the patient for this visit:  Symptoms you have experienced in the last 30 days (Submitted on 7/26/2023)  General Symptoms: No  Skin Symptoms: No  HENT Symptoms: No  EYE SYMPTOMS: No  HEART SYMPTOMS: No  LUNG SYMPTOMS: No  INTESTINAL SYMPTOMS: No  URINARY SYMPTOMS: No  GYNECOLOGIC SYMPTOMS: No  BREAST SYMPTOMS: Yes  SKELETAL SYMPTOMS: No  BLOOD SYMPTOMS: Yes  NERVOUS SYSTEM SYMPTOMS: No  MENTAL HEALTH SYMPTOMS: No  Please answer the questions below to tell us what condition you are experiencing: (Submitted on 7/26/2023)  Discharge: Yes  Lumps: No  Pain: No  Nipple retraction: No  Please answer the questions below to tell us what condition you are experiencing: (Submitted on 7/26/2023)  Anemia: Yes  Swollen glands: No  Easy bleeding or bruising: No  Edema or swelling: No        Labs reviewed in EPIC  BP Readings from Last 3 Encounters:   08/02/23 95/60   05/22/23 101/67   05/18/23 109/70    Wt Readings from Last 3 Encounters:   08/02/23 68.4 kg (150 lb 14.4 oz)   05/22/23 68 kg (150 lb)   05/18/23 68 kg (150 lb)               Immunization History   Administered Date(s) Administered    COVID-19 Monovalent 18+ (Moderna) 04/27/2021, 05/24/2021, 12/12/2021    TDAP (Adacel,Boostrix) 01/05/2017    TDAP Vaccine (Boostrix) 01/05/2017    Twinrix A/B 08/02/2023        Patient Active Problem List   Diagnosis    Elevated TSH    Thyroid disease, antepartum    History of multiple miscarriages    Hashimoto's thyroiditis    Indication for care in labor or delivery    Labor and delivery, indication for care     Rhinoconjunctivitis    Chronic sinusitis, unspecified location    Gastroesophageal reflux disease without esophagitis    Non-functioning gallbladder    Eczema, unspecified type    Pruritic disorder    Biliary colic    Neck pain     Past Surgical History:   Procedure Laterality Date    COLONOSCOPY N/A 2023    Procedure: Colonoscopy;  Surgeon: Avril Serra MD;  Location: [facility]    DAVINCI LAPAROSCOPIC CHOLECYSTECTOMY WITHOUT GRAMS N/A 2021    Procedure: CHOLECYSTECTOMY, ROBOT-ASSISTED, LAPAROSCOPIC;  Surgeon: Olman Goss MD;  Location: [facility] OR    DAVINCI PELVIC PROCEDURE  2012    Procedure:DAVINCI PELVIC PROCEDURE; DAVINCI DIAGNOSTIC PELVISCOPY WITH OMNIGUIDE C02 LASER, DIAGNOSTIC HYSTEROSCOPY, EXTENSIVE LYSIS EXCISION OF ENDOMETRIOSIS, BILATERAL URETEROLYSIS, D & C; Surgeon:JOHN KELLY; Location: OR       Social History     Tobacco Use    Smoking status: Never    Smokeless tobacco: Never   Substance Use Topics    Alcohol use: No     Family History   Problem Relation Age of Onset    Hypertension Mother     Cancer Other         liver cancer    Liver Cancer Maternal Grandmother 79    Hypertension Maternal Grandfather     Heart Disease Paternal Grandmother 62    Ovarian Cancer Paternal Grandmother 92    Heart Disease Paternal Grandfather 60    Breast Cancer Paternal Aunt 59            Melanoma No family hx of     Skin Cancer No family hx of          Current Outpatient Medications   Medication Sig Dispense Refill    cetirizine (ZYRTEC) 10 MG tablet Take 10 mg by mouth daily as needed for allergies      levothyroxine (SYNTHROID/LEVOTHROID) 75 MCG tablet Take 1 tablet (75 mcg) by mouth daily 90 tablet 3     Allergies   Allergen Reactions    Amoxicillin-Pot Clavulanate Nausea and Vomiting    Nickel Rash     Recent Labs   Lab Test 23  0933 22  1028 21  0955 21  1015 20  1223 19  1232 10/25/18  0920 18  1028 16  1027 16  0832    A1C  --   --   --   --  5.4  --  5.5 5.9*   < >  --    LDL  --   --  70  --   --   --   --   --   --  67   HDL  --   --  54  --   --   --   --   --   --  53   TRIG  --   --  57  --   --   --   --   --   --  46   ALT 9  --  18 22 20   < >  --   --    < > 17   CR 0.64  --  0.61 0.68 0.57   < >  --   --    < > 0.57   GFRESTIMATED >90  --  >90 >90 >90   < >  --   --    < > >90  Non  GFR Calc     GFRESTBLACK  --   --  >90 >90 >90   < >  --   --    < > >90   GFR Calc     POTASSIUM 3.8  --  3.6 4.2 4.1   < >  --   --    < > 3.5   TSH 3.69 2.58 1.76 2.11 1.41   < > 1.74 1.87   < > 1.62    < > = values in this interval not displayed.         Social History:   Social History     Socioeconomic History    Marital status:      Spouse name: Not on file    Number of children: 2    Years of education: Not on file    Highest education level: Not on file   Occupational History    Not on file   Tobacco Use    Smoking status: Never    Smokeless tobacco: Never   Vaping Use    Vaping Use: Never used   Substance and Sexual Activity    Alcohol use: No    Drug use: No    Sexual activity: Yes     Partners: Male     Comment:    Other Topics Concern    Parent/sibling w/ CABG, MI or angioplasty before 65F 55M? Not Asked   Social History Narrative     2009  daughter Maday son Schuyler. She lived in [place]. [place] accident when she was 6.     Social Determinants of Health     Financial Resource Strain: Low Risk  (10/22/2020)    Overall Financial Resource Strain (CARDIA)     Difficulty of Paying Living Expenses: Not hard at all   Food Insecurity: No Food Insecurity (10/22/2020)    Hunger Vital Sign     Worried About Running Out of Food in the Last Year: Never true     Ran Out of Food in the Last Year: Never true   Transportation Needs: No Transportation Needs (10/22/2020)    PRAPARE - Transportation     Lack of Transportation (Medical): No     Lack of Transportation (Non-Medical): No    Physical Activity: Inactive (7/7/2021)    Exercise Vital Sign     Days of Exercise per Week: 0 days     Minutes of Exercise per Session: 0 min   Stress: Stress Concern Present (7/7/2021)    [employer] - Occupational Stress Questionnaire     Feeling of Stress : To some extent   Social Connections: Unknown (7/7/2021)    Social Connection and Isolation Panel [NHANES]     Frequency of Communication with Friends and Family: Not on file     Frequency of Social Gatherings with Friends and Family: Not on file     Attends [employer] Services: 1 to 4 times per year     Active Member of Clubs or Organizations: No     Attends Club or Organization Meetings: Not asked     Marital Status: Not on file   Intimate Partner Violence: Not At Risk (10/22/2020)    Humiliation, Afraid, Rape, and Kick questionnaire     Fear of Current or Ex-Partner: No     Emotionally Abused: No     Physically Abused: No     Sexually Abused: No   Housing Stability: Not on file     Home: [place] [facility], and now lives in [place] with her  and two children daughter  and son.   Physical Exam:   BP 95/60 (BP Location: Right arm, Patient Position: Sitting, Cuff Size: Adult Regular)   Pulse 85   Wt 68.4 kg (150 lb 14.4 oz)   LMP 07/13/2023 (Approximate)   SpO2 100%   BMI 25.90 kg/m     General:  Pleasant, alert, no acute distress, appear healthy, tan  EYES: Eyes grossly normal to inspection, PERRL and conjunctivae and sclerae normal  HENT: ear canals and TM's normal, mouth without ulcers, oropharynx clear and oral mucous membranes moist  NECK: no adenopathy, no asymmetry, masses, or scars and thyroid normal to palpation  Lungs:  Clear to auscultation throughout, no wheezes, rhonchi or rales.  C/V:  Regular rate and rhythm, no murmurs, rubs or gallops.  No JVD, no carotid bruits.  No peripheral edema.    BREAST: normal without masses, tenderness or nipple discharge and no palpable axillary masses  or adenopathy    Abdomen: well healed laparoscopic surgical scars  Not distended.  Bowel sounds active.  No tenderness, no hepatosplenomegaly or masses.  No CVA tenderness or masses. Negative barajas's sign, liver and spleen normal.   Lymph:  No cervical, no axillary lymph nodes.  Neuro:   Face symmetric. No tremor.  Gait steady.  DTRs 2+ upper and lower bilaterally symmetrical see musculoskeletal for strength.  M/S:  Full strength,  No joint deformities noted.  No joint swelling.  Skin:Tan,  No jaundice.  Normal moisture, good skin turgor.   Psych:  Interactive, well groomed, mood stable  I clarified PHQ 2 score  PHQ-2 Score:         8/2/2023     9:11 AM 2/2/2023     2:24 PM   PHQ-2 ( 1999 Pfizer)   Q1: Little interest or pleasure in doing things 0 0   Q2: Feeling down, depressed or hopeless 0 0   PHQ-2 Score 0 0           9/28/2020    11:32 AM 7/7/2021     9:12 AM 8/2/2023     9:11 AM   PHQ   PHQ-9 Total Score 6 1 2   Q9: Thoughts of better off dead/self-harm past 2 weeks Not at all Not at all Not at all         9/28/2020    11:32 AM 7/7/2021     9:12 AM 8/2/2023     9:11 AM   CARLIN-7 SCORE   Total Score 9 0 1      Results for orders placed or performed in visit on 08/02/23   Comprehensive metabolic panel (BMP + Alb, Alk Phos, ALT, AST, Total. Bili, TP)     Status: Abnormal   Result Value Ref Range    Sodium 138 136 - 145 mmol/L    Potassium 3.8 3.4 - 5.3 mmol/L    Chloride 104 98 - 107 mmol/L    Carbon Dioxide (CO2) 26 22 - 29 mmol/L    Anion Gap 8 7 - 15 mmol/L    Urea Nitrogen 6.7 6.0 - 20.0 mg/dL    Creatinine 0.64 0.51 - 0.95 mg/dL    Calcium 9.3 8.6 - 10.0 mg/dL    Glucose 103 (H) 70 - 99 mg/dL    Alkaline Phosphatase 43 35 - 104 U/L    AST 14 0 - 45 U/L    ALT 9 0 - 50 U/L    Protein Total 7.4 6.4 - 8.3 g/dL    Albumin 4.6 3.5 - 5.2 g/dL    Bilirubin Total 0.3 <=1.2 mg/dL    GFR Estimate >90 >60 mL/min/1.73m2   TSH with free T4 reflex     Status: Normal   Result Value Ref Range    TSH 3.69 0.30 - 4.20 uIU/mL    Ferritin     Status: Normal   Result Value Ref Range    Ferritin 43 6 - 175 ng/mL   CBC with platelets and differential     Status: None   Result Value Ref Range    WBC Count 6.3 4.0 - 11.0 10e3/uL    RBC Count 3.97 3.80 - 5.20 10e6/uL    Hemoglobin 12.3 11.7 - 15.7 g/dL    Hematocrit 37.4 35.0 - 47.0 %    MCV 94 78 - 100 fL    MCH 31.0 26.5 - 33.0 pg    MCHC 32.9 31.5 - 36.5 g/dL    RDW 12.2 10.0 - 15.0 %    Platelet Count 263 150 - 450 10e3/uL    % Neutrophils 67 %    % Lymphocytes 24 %    % Monocytes 6 %    % Eosinophils 2 %    % Basophils 1 %    % Immature Granulocytes 0 %    NRBCs per 100 WBC 0 <1 /100    Absolute Neutrophils 4.3 1.6 - 8.3 10e3/uL    Absolute Lymphocytes 1.5 0.8 - 5.3 10e3/uL    Absolute Monocytes 0.4 0.0 - 1.3 10e3/uL    Absolute Eosinophils 0.1 0.0 - 0.7 10e3/uL    Absolute Basophils 0.1 0.0 - 0.2 10e3/uL    Absolute Immature Granulocytes 0.0 <=0.4 10e3/uL    Absolute NRBCs 0.0 10e3/uL   CBC with platelets and differential     Status: None    Narrative    The following orders were created for panel order CBC with platelets and differential.  Procedure                               Abnormality         Status                     ---------                               -----------         ------                     CBC with platelets and d...[[id number]]                      Final result                 Please view results for these tests on the individual orders.        Findings:       The perianal and digital rectal examinations were normal.        The entire examined colon appeared normal.                                                                                    Impression:               - The entire examined colon is normal.                             - No specimens collected.   Recommendation:           - Discharge patient to home (ambulatory).                             - Repeat colonoscopy in 10 years for screening                             purposes.         Assessment and  Plan:  Karen Beverly is 43 with a history of endometriosis, Hashimoto's thyroiditis, and  non-functioning gallbladder s/p Laparoscopic cholecystectomy who presents for physical.       Diagnoses and all orders for this visit:    Encounter for preventive care  She appears very well  -     Comprehensive metabolic panel (BMP + Alb, Alk Phos, ALT, AST, Total. Bili, TP); Future  -     CBC with platelets and differential; Future  -     Ferritin; Future    Encounter for immunization  Today reviewed immunizations Twinrix No. 1 today, #2 in 1 to 2 months #3 in 6 months may obtain through nurse visit or local pharmacy.  -     HEPATITIS A AND B VACCINE (TWINRIX), ADULT    Hashimoto's thyroiditis  Thyroid function returned after visit renewed levothyroxine 75 mcg daily.  -     TSH with free T4 reflex; Future  -     levothyroxine (SYNTHROID/LEVOTHROID) 75 MCG tablet; Take 1 tablet (75 mcg) by mouth daily    Visit for screening mammogram  Due for screening mammogram , she is still having some lactation occasionally, stable  -     Mammogram, screening w [facility] (3D); Future    Gastroesophageal reflux disease without esophagitis  Pruritus ani  Gastrointestinal symptoms are stable after she discontinued avocados which might of been contributing to her GI symptoms.  She had recent colonoscopy which looked normal.  She is happy she localized the food that was contributing to symptoms.   Encounter for preventive care  I encouraged annual flu vaccine and COVID in the fall    Additional 20  minutes spent on the date of the encounter evaluation and management,  doing chart review, history, exam, diagnostics review, documentation, counseling and coordination of cares as noted exclusive of preventive cares.    Follow-up: Return in about 1 year earlier if concerns       Lasr Burt MD

## 2023-08-02 ENCOUNTER — OFFICE VISIT (OUTPATIENT)
Dept: FAMILY MEDICINE | Facility: CLINIC | Age: 43
End: 2023-08-02
Payer: COMMERCIAL

## 2023-08-02 ENCOUNTER — TELEPHONE (OUTPATIENT)
Dept: FAMILY MEDICINE | Facility: CLINIC | Age: 43
End: 2023-08-02

## 2023-08-02 ENCOUNTER — LAB (OUTPATIENT)
Dept: LAB | Facility: CLINIC | Age: 43
End: 2023-08-02
Payer: COMMERCIAL

## 2023-08-02 VITALS
BODY MASS INDEX: 25.9 KG/M2 | HEART RATE: 85 BPM | DIASTOLIC BLOOD PRESSURE: 60 MMHG | SYSTOLIC BLOOD PRESSURE: 95 MMHG | OXYGEN SATURATION: 100 % | WEIGHT: 150.9 LBS

## 2023-08-02 DIAGNOSIS — E06.3 HASHIMOTO'S THYROIDITIS: ICD-10-CM

## 2023-08-02 DIAGNOSIS — Z00.00 ENCOUNTER FOR PREVENTIVE CARE: Primary | ICD-10-CM

## 2023-08-02 DIAGNOSIS — K21.9 GASTROESOPHAGEAL REFLUX DISEASE WITHOUT ESOPHAGITIS: ICD-10-CM

## 2023-08-02 DIAGNOSIS — Z12.31 VISIT FOR SCREENING MAMMOGRAM: ICD-10-CM

## 2023-08-02 DIAGNOSIS — Z00.00 ENCOUNTER FOR PREVENTIVE CARE: ICD-10-CM

## 2023-08-02 DIAGNOSIS — Z23 ENCOUNTER FOR IMMUNIZATION: ICD-10-CM

## 2023-08-02 LAB
ALBUMIN SERPL BCG-MCNC: 4.6 G/DL (ref 3.5–5.2)
ALP SERPL-CCNC: 43 U/L (ref 35–104)
ALT SERPL W P-5'-P-CCNC: 9 U/L (ref 0–50)
ANION GAP SERPL CALCULATED.3IONS-SCNC: 8 MMOL/L (ref 7–15)
AST SERPL W P-5'-P-CCNC: 14 U/L (ref 0–45)
BASOPHILS # BLD AUTO: 0.1 10E3/UL (ref 0–0.2)
BASOPHILS NFR BLD AUTO: 1 %
BILIRUB SERPL-MCNC: 0.3 MG/DL
BUN SERPL-MCNC: 6.7 MG/DL (ref 6–20)
CALCIUM SERPL-MCNC: 9.3 MG/DL (ref 8.6–10)
CHLORIDE SERPL-SCNC: 104 MMOL/L (ref 98–107)
CREAT SERPL-MCNC: 0.64 MG/DL (ref 0.51–0.95)
DEPRECATED HCO3 PLAS-SCNC: 26 MMOL/L (ref 22–29)
EOSINOPHIL # BLD AUTO: 0.1 10E3/UL (ref 0–0.7)
EOSINOPHIL NFR BLD AUTO: 2 %
ERYTHROCYTE [DISTWIDTH] IN BLOOD BY AUTOMATED COUNT: 12.2 % (ref 10–15)
FERRITIN SERPL-MCNC: 43 NG/ML (ref 6–175)
GFR SERPL CREATININE-BSD FRML MDRD: >90 ML/MIN/1.73M2
GLUCOSE SERPL-MCNC: 103 MG/DL (ref 70–99)
HCT VFR BLD AUTO: 37.4 % (ref 35–47)
HGB BLD-MCNC: 12.3 G/DL (ref 11.7–15.7)
IMM GRANULOCYTES # BLD: 0 10E3/UL
IMM GRANULOCYTES NFR BLD: 0 %
LYMPHOCYTES # BLD AUTO: 1.5 10E3/UL (ref 0.8–5.3)
LYMPHOCYTES NFR BLD AUTO: 24 %
MCH RBC QN AUTO: 31 PG (ref 26.5–33)
MCHC RBC AUTO-ENTMCNC: 32.9 G/DL (ref 31.5–36.5)
MCV RBC AUTO: 94 FL (ref 78–100)
MONOCYTES # BLD AUTO: 0.4 10E3/UL (ref 0–1.3)
MONOCYTES NFR BLD AUTO: 6 %
NEUTROPHILS # BLD AUTO: 4.3 10E3/UL (ref 1.6–8.3)
NEUTROPHILS NFR BLD AUTO: 67 %
NRBC # BLD AUTO: 0 10E3/UL
NRBC BLD AUTO-RTO: 0 /100
PLATELET # BLD AUTO: 263 10E3/UL (ref 150–450)
POTASSIUM SERPL-SCNC: 3.8 MMOL/L (ref 3.4–5.3)
PROT SERPL-MCNC: 7.4 G/DL (ref 6.4–8.3)
RBC # BLD AUTO: 3.97 10E6/UL (ref 3.8–5.2)
SODIUM SERPL-SCNC: 138 MMOL/L (ref 136–145)
TSH SERPL DL<=0.005 MIU/L-ACNC: 3.69 UIU/ML (ref 0.3–4.2)
WBC # BLD AUTO: 6.3 10E3/UL (ref 4–11)

## 2023-08-02 PROCEDURE — 85025 COMPLETE CBC W/AUTO DIFF WBC: CPT | Performed by: PATHOLOGY

## 2023-08-02 PROCEDURE — 80053 COMPREHEN METABOLIC PANEL: CPT | Performed by: PATHOLOGY

## 2023-08-02 PROCEDURE — 90471 IMMUNIZATION ADMIN: CPT | Performed by: FAMILY MEDICINE

## 2023-08-02 PROCEDURE — 99213 OFFICE O/P EST LOW 20 MIN: CPT | Mod: 25 | Performed by: FAMILY MEDICINE

## 2023-08-02 PROCEDURE — 99396 PREV VISIT EST AGE 40-64: CPT | Mod: 25 | Performed by: FAMILY MEDICINE

## 2023-08-02 PROCEDURE — 90636 HEP A/HEP B VACC ADULT IM: CPT | Performed by: FAMILY MEDICINE

## 2023-08-02 PROCEDURE — 84443 ASSAY THYROID STIM HORMONE: CPT | Performed by: PATHOLOGY

## 2023-08-02 PROCEDURE — 36415 COLL VENOUS BLD VENIPUNCTURE: CPT | Performed by: PATHOLOGY

## 2023-08-02 PROCEDURE — 82728 ASSAY OF FERRITIN: CPT | Performed by: PATHOLOGY

## 2023-08-02 RX ORDER — LEVOTHYROXINE SODIUM 75 UG/1
75 TABLET ORAL DAILY
Qty: 90 TABLET | Refills: 3 | Status: SHIPPED | OUTPATIENT
Start: 2023-08-02 | End: 2024-07-22

## 2023-08-02 ASSESSMENT — ANXIETY QUESTIONNAIRES
GAD7 TOTAL SCORE: 1
6. BECOMING EASILY ANNOYED OR IRRITABLE: NOT AT ALL
3. WORRYING TOO MUCH ABOUT DIFFERENT THINGS: NOT AT ALL
7. FEELING AFRAID AS IF SOMETHING AWFUL MIGHT HAPPEN: NOT AT ALL
GAD7 TOTAL SCORE: 1
5. BEING SO RESTLESS THAT IT IS HARD TO SIT STILL: NOT AT ALL
1. FEELING NERVOUS, ANXIOUS, OR ON EDGE: SEVERAL DAYS
IF YOU CHECKED OFF ANY PROBLEMS ON THIS QUESTIONNAIRE, HOW DIFFICULT HAVE THESE PROBLEMS MADE IT FOR YOU TO DO YOUR WORK, TAKE CARE OF THINGS AT HOME, OR GET ALONG WITH OTHER PEOPLE: NOT DIFFICULT AT ALL
2. NOT BEING ABLE TO STOP OR CONTROL WORRYING: NOT AT ALL

## 2023-08-02 ASSESSMENT — PATIENT HEALTH QUESTIONNAIRE - PHQ9
5. POOR APPETITE OR OVEREATING: NOT AT ALL
SUM OF ALL RESPONSES TO PHQ QUESTIONS 1-9: 2

## 2023-08-02 NOTE — PATIENT INSTRUCTIONS
Twinrix #2 will be due on or after 9/2 and # 3 due on or after 2/2/2024 through nurse visit or local pharmacy

## 2023-08-02 NOTE — NURSING NOTE
Karen Beverly is a 43 year old female patient that presents today in clinic for the following:    Chief Complaint   Patient presents with    Physical     Thyroid and general check up per pt      The patient's allergies and medications were reviewed as noted. A set of vitals were recorded as noted without incident: BP 95/60 (BP Location: Right arm, Patient Position: Sitting, Cuff Size: Adult Regular)   Pulse 85   Wt 68.4 kg (150 lb 14.4 oz)   LMP 07/13/2023 (Approximate)   SpO2 100%   BMI 25.90 kg/m  . The patient does not have any other questions for the provider.    Louise Allen, EMT at 8:24 AM on 8/2/2023

## 2023-08-04 NOTE — RESULT ENCOUNTER NOTE
Dear Karen Pinedak   Your thyroid, ferritin, complete blood count including hemoglobin,white cell count for infection, kidney, liver, blood sugar,calcium, sodium and potassium were normal or within acceptable range.   Best wishes,  Lars Burt MD

## 2023-09-20 ENCOUNTER — ALLIED HEALTH/NURSE VISIT (OUTPATIENT)
Dept: FAMILY MEDICINE | Facility: CLINIC | Age: 43
End: 2023-09-20
Payer: COMMERCIAL

## 2023-09-20 DIAGNOSIS — Z23 ENCOUNTER FOR IMMUNIZATION: Primary | ICD-10-CM

## 2023-09-20 PROCEDURE — 90471 IMMUNIZATION ADMIN: CPT

## 2023-09-20 PROCEDURE — 90636 HEP A/HEP B VACC ADULT IM: CPT

## 2023-09-20 PROCEDURE — 99207 PR NO CHARGE NURSE ONLY: CPT

## 2023-09-20 NOTE — PROGRESS NOTES
Prior to immunization administration, verified patients identity using patient s name and date of birth. Please see Immunization Activity for additional information.     Screening Questionnaire for Adult Immunization    Are you sick today?   No   Do you have allergies to medications, food, a vaccine component or latex?   No   Have you ever had a serious reaction after receiving a vaccination?   No   Do you have a long-term health problem with heart, lung, kidney, or metabolic disease (e.g., diabetes), asthma, a blood disorder, no spleen, complement component deficiency, a cochlear implant, or a spinal fluid leak?  Are you on long-term aspirin therapy?   No   Do you have cancer, leukemia, HIV/AIDS, or any other immune system problem?   No   Do you have a parent, brother, or sister with an immune system problem?   No   In the past 3 months, have you taken medications that affect  your immune system, such as prednisone, other steroids, or anticancer drugs; drugs for the treatment of rheumatoid arthritis, Crohn s disease, or psoriasis; or have you had radiation treatments?   No   Have you had a seizure, or a brain or other nervous system problem?   No   During the past year, have you received a transfusion of blood or blood    products, or been given immune (gamma) globulin or antiviral drug?   No   For women: Are you pregnant or is there a chance you could become       pregnant during the next month?   No   Have you received any vaccinations in the past 4 weeks?   No     Immunization questionnaire answers were all negative.    I have reviewed the following standing orders:     This patient is due and qualifies for the Hepatitis A & B vaccine.    Click here for HEP A & B STANDING ORDER    I have reviewed the vaccines inclusion and exclusion criteria; No concerns regarding eligibility.     Spoke with William Sun PA-C, patient due for Hepatitis B only at this time, Hep B out of stock in clinic, per William Sun PA-C ok to  give Twinrix. Patient notified and ok with receiving Twinrix.     Patient instructed to remain in clinic for 15 minutes afterwards, and to report any adverse reactions.     Screening performed by Fatimah Pittman MA on 9/20/2023 at 9:34 AM.

## 2023-10-17 ENCOUNTER — APPOINTMENT (OUTPATIENT)
Dept: URBAN - METROPOLITAN AREA CLINIC 252 | Age: 43
Setting detail: DERMATOLOGY
End: 2023-10-17

## 2023-10-17 VITALS — HEIGHT: 64 IN | WEIGHT: 150 LBS | RESPIRATION RATE: 18 BRPM

## 2023-10-17 DIAGNOSIS — L28.1 PRURIGO NODULARIS: ICD-10-CM

## 2023-10-17 PROCEDURE — 17110 DESTRUCT B9 LESION 1-14: CPT

## 2023-10-17 PROCEDURE — OTHER COUNSELING: OTHER

## 2023-10-17 PROCEDURE — OTHER BENIGN DESTRUCTION: OTHER

## 2023-10-17 ASSESSMENT — LOCATION ZONE DERM: LOCATION ZONE: LEG

## 2023-10-17 ASSESSMENT — LOCATION DETAILED DESCRIPTION DERM: LOCATION DETAILED: RIGHT PROXIMAL PRETIBIAL REGION

## 2023-10-17 ASSESSMENT — LOCATION SIMPLE DESCRIPTION DERM: LOCATION SIMPLE: RIGHT PRETIBIAL REGION

## 2023-10-17 NOTE — PROCEDURE: COUNSELING
Patient Specific Counseling (Will Not Stick From Patient To Patient): Discussed the treatment options today, recommended LN2 today, explained that lower legs can take awhile to heal.
Detail Level: Detailed

## 2023-10-17 NOTE — PROCEDURE: BENIGN DESTRUCTION
Anesthesia Volume In Cc: 0.5
Render Post-Care Instructions In Note?: no
Treatment Number (Will Not Render If 0): 0
Detail Level: Detailed
Medical Necessity Information: It is in your best interest to select a reason for this procedure from the list below. All of these items fulfill various CMS LCD requirements except the new and changing color options.
Post-Care Instructions: I reviewed with the patient in detail post-care instructions. Patient is to wear sunprotection, and avoid picking at any of the treated lesions. Pt may apply Vaseline to crusted or scabbing areas.
Consent: The patient's consent was obtained including but not limited to risks of crusting, scabbing, blistering, scarring, darker or lighter pigmentary change, recurrence, incomplete removal and infection.
Medical Necessity Clause: This procedure was medically necessary because the lesions that were treated were:

## 2023-11-06 ENCOUNTER — ANCILLARY PROCEDURE (OUTPATIENT)
Dept: MAMMOGRAPHY | Facility: CLINIC | Age: 43
End: 2023-11-06
Attending: FAMILY MEDICINE
Payer: COMMERCIAL

## 2023-11-06 DIAGNOSIS — Z12.31 VISIT FOR SCREENING MAMMOGRAM: ICD-10-CM

## 2023-11-06 PROCEDURE — 77067 SCR MAMMO BI INCL CAD: CPT | Mod: GC | Performed by: RADIOLOGY

## 2023-11-06 PROCEDURE — 77063 BREAST TOMOSYNTHESIS BI: CPT | Mod: GC | Performed by: RADIOLOGY

## 2023-12-06 ENCOUNTER — OFFICE VISIT (OUTPATIENT)
Dept: FAMILY MEDICINE | Facility: CLINIC | Age: 43
End: 2023-12-06
Payer: COMMERCIAL

## 2023-12-06 VITALS
RESPIRATION RATE: 16 BRPM | DIASTOLIC BLOOD PRESSURE: 74 MMHG | HEIGHT: 64 IN | TEMPERATURE: 98.4 F | OXYGEN SATURATION: 100 % | HEART RATE: 84 BPM | SYSTOLIC BLOOD PRESSURE: 124 MMHG | WEIGHT: 155 LBS | BODY MASS INDEX: 26.46 KG/M2

## 2023-12-06 DIAGNOSIS — J01.00 ACUTE NON-RECURRENT MAXILLARY SINUSITIS: Primary | ICD-10-CM

## 2023-12-06 PROBLEM — E03.9 HYPOTHYROIDISM: Status: ACTIVE | Noted: 2021-10-19

## 2023-12-06 PROCEDURE — 99213 OFFICE O/P EST LOW 20 MIN: CPT | Performed by: PHYSICIAN ASSISTANT

## 2023-12-06 RX ORDER — ALBUTEROL SULFATE 90 UG/1
AEROSOL, METERED RESPIRATORY (INHALATION)
COMMUNITY

## 2023-12-06 RX ORDER — CEFDINIR 300 MG/1
600 CAPSULE ORAL DAILY
Qty: 14 CAPSULE | Refills: 0 | Status: SHIPPED | OUTPATIENT
Start: 2023-12-06 | End: 2023-12-13

## 2023-12-06 RX ORDER — FLUTICASONE PROPIONATE 50 MCG
SPRAY, SUSPENSION (ML) NASAL
COMMUNITY
Start: 2022-12-07

## 2023-12-06 ASSESSMENT — PAIN SCALES - GENERAL: PAINLEVEL: EXTREME PAIN (9)

## 2023-12-06 ASSESSMENT — ENCOUNTER SYMPTOMS: HEADACHES: 1

## 2023-12-06 NOTE — PROGRESS NOTES
"  Assessment & Plan     Acute non-recurrent maxillary sinusitis  Discussed with patient symptoms are consistent with sinus infection. Symptoms have been persisting long enough where an antibiotic would be warranted. Side effects of medication discussed. In addition I recommend warm compress's over the sinus's, nasal sprays, and sinus rinses. OTC pain relievers as needed. If symptoms do not improve after completing antibiotic then please follow up in clinic or sooner if symptoms worsen. Patient agree's with this plan and has no further questions  - cefdinir (OMNICEF) 300 MG capsule; Take 2 capsules (600 mg) by mouth daily for 7 days         BMI:   Estimated body mass index is 26.61 kg/m  as calculated from the following:    Height as of this encounter: 1.626 m (5' 4\").    Weight as of this encounter: 70.3 kg (155 lb).     KELLIE Lindsay  [facility]    Hilda Jones is a 43 year old, presenting for the following health issues:  Headache and Sinus Problem        12/6/2023     8:39 AM   Additional Questions   Roomed by Rosalie ANDRES   Accompanied by self         12/6/2023     8:39 AM   Patient Reported Additional Medications   Patient reports taking the following new medications none       History of Present Illness       Reason for visit:  Pressure on my forehead, eyes, nose, teeth with hedeache .  Symptom onset:  3-4 weeks ago  Symptoms include:  Strong pressure, pain, sometimes green mucuse from nose.  Symptom intensity:  Severe  Symptom progression:  Worsening  Had these symptoms before:  No    She eats 2-3 servings of fruits and vegetables daily.She consumes 0 sweetened beverage(s) daily.She exercises with enough effort to increase her heart rate 9 or less minutes per day.  She exercises with enough effort to increase her heart rate 3 or less days per week.   She is taking medications regularly.       Acute Illness  Acute illness concerns: Possible sinus infection ?  Onset/Duration: " "3-4 weeks   Symptoms:  Fever: No  Chills/Sweats: YES  Headache (location?): YES  Sinus Pressure: YES  Conjunctivitis:  No  Ear Pain: no  Rhinorrhea: No  Congestion: YES  Sore Throat: No- post nasal drip  Cough: YES-productive of green sputum  Wheeze: No  Decreased Appetite: No  Nausea: No  Vomiting: No  Diarrhea: No  Dysuria/Freq.: No  Dysuria or Hematuria: No  Fatigue/Achiness: YES  Sick/Strep Exposure: YES- son was sick  Therapies tried and outcome:  Tylenol/ Ibuprofen, Musinex, theraflu - had Covid back in October -  Her kids are sick. No recent travel.         Review of Systems   Neurological:  Positive for headaches.      Constitutional, HEENT, cardiovascular, pulmonary, gi and gu systems are negative, except as otherwise noted.      Objective    /74 (BP Location: Right arm, Patient Position: Sitting, Cuff Size: Adult Regular)   Pulse 84   Temp 98.4  F (36.9  C) (Oral)   Resp 16   Ht 1.626 m (5' 4\")   Wt 70.3 kg (155 lb)   LMP 11/22/2023   SpO2 100%   BMI 26.61 kg/m    Body mass index is 26.61 kg/m .  Physical Exam  HENT:      Head: Normocephalic and atraumatic.      Right Ear: Ear canal and external ear normal. A middle ear effusion is present.      Left Ear: A middle ear effusion is present.      Nose:      Right Turbinates: Enlarged.      Left Turbinates: Enlarged.      Right Sinus: Maxillary sinus tenderness and frontal sinus tenderness present.      Left Sinus: Maxillary sinus tenderness and frontal sinus tenderness present.      Mouth/Throat:      Mouth: Mucous membranes are moist.      Pharynx: Posterior oropharyngeal erythema present.      Comments: Postnasal drainage  Cardiovascular:      Rate and Rhythm: Normal rate and regular rhythm.   Pulmonary:      Breath sounds: Normal breath sounds. No wheezing.   Lymphadenopathy:      Cervical: No cervical adenopathy.                   "

## 2023-12-06 NOTE — PATIENT INSTRUCTIONS

## 2023-12-20 ENCOUNTER — APPOINTMENT (OUTPATIENT)
Dept: URBAN - METROPOLITAN AREA CLINIC 252 | Age: 43
Setting detail: DERMATOLOGY
End: 2023-12-26

## 2023-12-20 VITALS — RESPIRATION RATE: 18 BRPM | HEIGHT: 64 IN | WEIGHT: 150 LBS

## 2023-12-20 DIAGNOSIS — D49.2 NEOPLASM OF UNSPECIFIED BEHAVIOR OF BONE, SOFT TISSUE, AND SKIN: ICD-10-CM

## 2023-12-20 PROCEDURE — 17110 DESTRUCT B9 LESION 1-14: CPT

## 2023-12-20 PROCEDURE — OTHER LIQUID NITROGEN: OTHER

## 2023-12-20 PROCEDURE — OTHER COUNSELING: OTHER

## 2023-12-20 ASSESSMENT — LOCATION SIMPLE DESCRIPTION DERM: LOCATION SIMPLE: RIGHT LIP

## 2023-12-20 ASSESSMENT — LOCATION DETAILED DESCRIPTION DERM: LOCATION DETAILED: RIGHT LOWER CUTANEOUS LIP

## 2023-12-20 ASSESSMENT — LOCATION ZONE DERM: LOCATION ZONE: LIP

## 2023-12-20 NOTE — PROCEDURE: COUNSELING
Patient Specific Counseling (Will Not Stick From Patient To Patient): Patient admits to picking at lesion.  History of warts.  Does not appear to look concerning for skin cancer, would advise treating as a wart virus today. May consider shave biopsy if there's no change or worsening. Avoid picking at lesion
Detail Level: Detailed

## 2024-02-08 ENCOUNTER — ALLIED HEALTH/NURSE VISIT (OUTPATIENT)
Dept: FAMILY MEDICINE | Facility: CLINIC | Age: 44
End: 2024-02-08
Payer: COMMERCIAL

## 2024-02-08 DIAGNOSIS — Z23 ENCOUNTER FOR IMMUNIZATION: Primary | ICD-10-CM

## 2024-02-08 PROCEDURE — 90471 IMMUNIZATION ADMIN: CPT

## 2024-02-08 PROCEDURE — 99207 PR NO CHARGE NURSE ONLY: CPT

## 2024-02-08 PROCEDURE — 90636 HEP A/HEP B VACC ADULT IM: CPT

## 2024-02-08 NOTE — PROGRESS NOTES
Prior to immunization administration, verified patients identity using patient s name and date of birth. Please see Immunization Activity for additional information.     Screening Questionnaire for Adult Immunization    Are you sick today?   No   Do you have allergies to medications, food, a vaccine component or latex?   No   Have you ever had a serious reaction after receiving a vaccination?   No   Do you have a long-term health problem with heart, lung, kidney, or metabolic disease (e.g., diabetes), asthma, a blood disorder, no spleen, complement component deficiency, a cochlear implant, or a spinal fluid leak?  Are you on long-term aspirin therapy?   No   Do you have cancer, leukemia, HIV/AIDS, or any other immune system problem?   No   Do you have a parent, brother, or sister with an immune system problem?   No   In the past 3 months, have you taken medications that affect  your immune system, such as prednisone, other steroids, or anticancer drugs; drugs for the treatment of rheumatoid arthritis, Crohn s disease, or psoriasis; or have you had radiation treatments?   No   Have you had a seizure, or a brain or other nervous system problem?   No   During the past year, have you received a transfusion of blood or blood    products, or been given immune (gamma) globulin or antiviral drug?   No   For women: Are you pregnant or is there a chance you could become       pregnant during the next month?   No   Have you received any vaccinations in the past 4 weeks?   No     Immunization questionnaire answers were all negative.    I have reviewed the following standing orders:     This patient is due and qualifies for the Hepatitis A & B vaccine.    Click here for HEP A & B STANDING ORDER    I have reviewed the vaccines inclusion and exclusion criteria; No concerns regarding eligibility.     Patient instructed to remain in clinic for 15 minutes afterwards, and to report any adverse reactions.     Screening performed by  Alma Jeffery, CMA on 2/8/2024 at 9:27 AM.

## 2024-03-07 ENCOUNTER — OFFICE VISIT (OUTPATIENT)
Dept: FAMILY MEDICINE | Facility: CLINIC | Age: 44
End: 2024-03-07
Payer: COMMERCIAL

## 2024-03-07 VITALS
WEIGHT: 156.2 LBS | HEART RATE: 99 BPM | DIASTOLIC BLOOD PRESSURE: 66 MMHG | TEMPERATURE: 98.6 F | SYSTOLIC BLOOD PRESSURE: 104 MMHG | HEIGHT: 64 IN | OXYGEN SATURATION: 100 % | BODY MASS INDEX: 26.67 KG/M2 | RESPIRATION RATE: 16 BRPM

## 2024-03-07 DIAGNOSIS — R52 BODY ACHES: Primary | ICD-10-CM

## 2024-03-07 DIAGNOSIS — R07.0 THROAT PAIN: ICD-10-CM

## 2024-03-07 LAB
DEPRECATED S PYO AG THROAT QL EIA: NEGATIVE
FLUAV AG SPEC QL IA: NEGATIVE
FLUBV AG SPEC QL IA: NEGATIVE
GROUP A STREP BY PCR: NOT DETECTED

## 2024-03-07 PROCEDURE — 87804 INFLUENZA ASSAY W/OPTIC: CPT | Performed by: PHYSICIAN ASSISTANT

## 2024-03-07 PROCEDURE — 99213 OFFICE O/P EST LOW 20 MIN: CPT | Performed by: PHYSICIAN ASSISTANT

## 2024-03-07 PROCEDURE — 87651 STREP A DNA AMP PROBE: CPT | Performed by: PHYSICIAN ASSISTANT

## 2024-03-07 ASSESSMENT — PAIN SCALES - GENERAL: PAINLEVEL: EXTREME PAIN (8)

## 2024-03-07 ASSESSMENT — PATIENT HEALTH QUESTIONNAIRE - PHQ9
SUM OF ALL RESPONSES TO PHQ QUESTIONS 1-9: 0
SUM OF ALL RESPONSES TO PHQ QUESTIONS 1-9: 0
10. IF YOU CHECKED OFF ANY PROBLEMS, HOW DIFFICULT HAVE THESE PROBLEMS MADE IT FOR YOU TO DO YOUR WORK, TAKE CARE OF THINGS AT HOME, OR GET ALONG WITH OTHER PEOPLE: NOT DIFFICULT AT ALL

## 2024-03-07 NOTE — LETTER
March 7, 2024      Karen Beverly  [address]  [place] [address]        To Whom It May Concern:    Karen Beveryl  was seen on 3/7/24.  Please excuse her for today 3/7/24 and tomorrow 3/8/24 due to illness.        Sincerely,        Marielle Huston PA

## 2024-03-07 NOTE — PROGRESS NOTES
"  Assessment & Plan     Body aches  strep and flu tests were negative. At home covid test also negative.  Likely a viral illness and should improve over the next 4-5 days with symptomatic care alone. I encourage rest, fluids, tea with honey, ibuprofen for pain and fevers, and decongestants for sinus and nasal congestion. Follow up in clinic if symptoms do not improve or worsen   - Influenza A & B Antigen - Clinic Collect    Throat pain  See plan above  - Streptococcus A Rapid Screen w/Reflex to PCR - Clinic Collect  - Group A Streptococcus PCR Throat Swab            BMI  Estimated body mass index is 26.81 kg/m  as calculated from the following:    Height as of this encounter: 1.626 m (5' 4\").    Weight as of this encounter: 70.9 kg (156 lb 3.2 oz).             Hilda Jones is a 43 year old, presenting for the following health issues:  URI    History of Present Illness       Reason for visit:  Throat/ears pain abd headache also fever  Symptom onset:  1-3 days ago  Symptoms include:  Fever,throat and ears pain, thick discharge from nose  Symptom intensity:  Severe  Symptom progression:  Staying the same  Had these symptoms before:  Yes  Has tried/received treatment for these symptoms:  Yes  Previous treatment was successful:  No  What makes it better:  Just fever went down    She eats 4 or more servings of fruits and vegetables daily.She consumes 0 sweetened beverage(s) daily.She exercises with enough effort to increase her heart rate 20 to 29 minutes per day.  She exercises with enough effort to increase her heart rate 3 or less days per week.   She is taking medications regularly.       Additional provider notes :   URI - symptoms started yesterday. She is having body aches and chills. She couldn't walk because she was so weak and shaky. She is having sinus congestion, nasal drainage. Her throat is painful and feels swollen. Her glands feel swollen. No cough, wheezing, or SOB. She  took tylenol for symptoms. " "                Review of Systems  Constitutional, HEENT, cardiovascular, pulmonary, gi and gu systems are negative, except as otherwise noted.      Objective    /66   Pulse 99   Temp 98.6  F (37  C) (Oral)   Resp 16   Ht 1.626 m (5' 4\")   Wt 70.9 kg (156 lb 3.2 oz)   LMP 02/01/2024 (Exact Date)   SpO2 100%   BMI 26.81 kg/m    Body mass index is 26.81 kg/m .  Physical Exam  Constitutional:       Appearance: Normal appearance. She is ill-appearing.   HENT:      Right Ear: Tympanic membrane and ear canal normal.      Left Ear: Tympanic membrane and ear canal normal.      Nose: Congestion and rhinorrhea present.      Right Sinus: Maxillary sinus tenderness present. No frontal sinus tenderness.      Left Sinus: Maxillary sinus tenderness present. No frontal sinus tenderness.      Mouth/Throat:      Mouth: Mucous membranes are moist.      Pharynx: Oropharynx is clear. Posterior oropharyngeal erythema present.   Cardiovascular:      Rate and Rhythm: Normal rate and regular rhythm.   Pulmonary:      Effort: Pulmonary effort is normal.      Breath sounds: Normal breath sounds.   Lymphadenopathy:      Cervical: No cervical adenopathy.   Neurological:      Mental Status: She is alert.   Psychiatric:         Mood and Affect: Mood normal.         Behavior: Behavior normal.            Results for orders placed or performed in visit on 03/07/24   Streptococcus A Rapid Screen w/Reflex to PCR - Clinic Collect     Status: Normal    Specimen: Throat; Swab   Result Value Ref Range    Group A Strep antigen Negative Negative   Influenza A & B Antigen - Clinic Collect     Status: Normal    Specimen: Nose; Swab   Result Value Ref Range    Influenza A antigen Negative Negative    Influenza B antigen Negative Negative    Narrative    Test results must be correlated with clinical data. If necessary, results should be confirmed by a molecular assay or viral culture.           Signed Electronically by: KELLIE Lindsay    "

## 2024-07-17 SDOH — HEALTH STABILITY: PHYSICAL HEALTH: ON AVERAGE, HOW MANY DAYS PER WEEK DO YOU ENGAGE IN MODERATE TO STRENUOUS EXERCISE (LIKE A BRISK WALK)?: 5 DAYS

## 2024-07-17 SDOH — HEALTH STABILITY: PHYSICAL HEALTH: ON AVERAGE, HOW MANY MINUTES DO YOU ENGAGE IN EXERCISE AT THIS LEVEL?: 40 MIN

## 2024-07-17 ASSESSMENT — SOCIAL DETERMINANTS OF HEALTH (SDOH): HOW OFTEN DO YOU GET TOGETHER WITH FRIENDS OR RELATIVES?: TWICE A WEEK

## 2024-07-21 NOTE — PROGRESS NOTES
"Preventive Care Visit  [facility]  Lars Burt MD, Family Medicine  Jul 22, 2024      Assessment & Plan     Encounter for preventative adult health care examination  Cervical cancer screening  Presents today for preventive visit Pap and pelvic  - REVIEW OF HEALTH MAINTENANCE PROTOCOL ORDERS  - Pap Screen with HPV - Recommended Age 30 - 65 Years      Hornet sting, accidental or unintentional, sequela  I reviewed try to stay away from hornet and bee stings try to stay away from hornet and bee stings if this occurs she has Benadryl at home and a nonsedating antihistamine Zyrtec.  As she was taking Zyrtec when the previous hornet sting occurred and lasted for approximately 1 month she may have more serious reaction with the next exposure therefore I reviewed EpiPen will be provided to have on hand.  - EPINEPHrine (ANY BX GENERIC EQUIV) 0.3 MG/0.3ML injection 2-pack  Dispense: 2 each; Refill: 1    Visit for screening mammogram  Due for mammogram  - Mammogram, screening w [facility] (3D)    Hashimoto's thyroiditis  Acquired hypothyroidism  Due for thyroid function  - TSH WITH FREE T4 REFLEX  - levothyroxine (SYNTHROID/LEVOTHROID) 75 MCG tablet  Dispense: 90 tablet; Refill: 3        BMI  Estimated body mass index is 27.72 kg/m  as calculated from the following:    Height as of this encounter: 1.626 m (5' 4\").    Weight as of this encounter: 73.3 kg (161 lb 8 oz).       Counseling  Appropriate preventive services were addressed with this patient via screening, questionnaire, or discussion as appropriate for fall prevention, nutrition, physical activity, Tobacco-use cessation, weight loss and cognition.  Checklist reviewing preventive services available has been given to the patient.  Reviewed patient's diet, addressing concerns and/or questions.   Lars Burt MD             Hilda Jones is a 44 year old, presenting for the following:  Physical, LAB REQUEST (TSH), " Gyn Exam (Pap), and Allergic Reaction (Hornets, 09/2023)        7/22/2024    10:23 AM   Additional Questions   Roomed by rody Beverly 44 history of endometriosis, Hashimoto's thyroiditis, and non-functioning gallbladder s/p laparoscopic cholecystectomy who presents for physical.    Hornet sting  She is a  and now has been stung by a hornet twice last September there is an aggressive hornet that stung her leg and she had urticaria for approximately 4 weeks in spite of being on cetirizine antihistamine and taking Benadryl.  She tries to wear protective clothing but is concerned in the late summer early fall is when the hornets get aggressive worried about the possibility of more serious allergic response wondering if she needs to have an EpiPen.  Her son has an EpiPen due to anaphylaxis.  Thyroid  Needs TSH check levothyroxine 75 mcg daily.  GI update, Cholecystectomy  She completed laparoscopic cholecystectomy. Liver hemangioma has been stable throughout imaging. Completed colonoscopy  Mood   Stable at this time.  Eczema  Her eczema is currently stable.       HCM  Due for PAP  History of dense breasts on mammogram due for mammogram in November.  SH: Possible travel plans to [facility] this summer.  Social History     Social History Narrative     2009  daughter Maday son Schuyler. She lived in [place]. [place] accident when she was 6.          7/17/2024   General Health   How would you rate your overall physical health? Good   Feel stress (tense, anxious, or unable to sleep) Not at all            7/17/2024   Nutrition   Three or more servings of calcium each day? Yes   Diet: I don't know   How many servings of fruit and vegetables per day? 4 or more   How many sweetened beverages each day? 0-1            7/17/2024   Exercise   Days per week of moderate/strenous exercise 5 days   Average minutes spent exercising at this level 40 min            7/17/2024   Social Factors    Frequency of gathering with friends or relatives Twice a week   Worry food won't last until get money to buy more No   Food not last or not have enough money for food? No   Do you have housing? (Housing is defined as stable permanent housing and does not include staying ouside in a car, in a tent, in an abandoned building, in an overnight shelter, or couch-surfing.) Yes   Are you worried about losing your housing? No   Lack of transportation? No   Unable to get utilities (heat,electricity)? No            7/17/2024   Dental   Dentist two times every year? Yes            7/17/2024   TB Screening   Were you born outside of the [place]? Yes                  7/17/2024   Substance Use   Alcohol more than 3/day or more than 7/wk No   Do you use any other substances recreationally? No        Social History     Tobacco Use    Smoking status: Never     Passive exposure: Never    Smokeless tobacco: Never   Vaping Use    Vaping status: Never Used   Substance Use Topics    Alcohol use: Never    Drug use: No           11/6/2023   LAST FHS-7 RESULTS   1st degree relative breast or ovarian cancer No   Any relative bilateral breast cancer No   Any male have breast cancer No   Any ONE woman have BOTH breast AND ovarian cancer No   Any woman with breast cancer before 50yrs Yes   2 or more relatives with breast AND/OR ovarian cancer Yes   2 or more relatives with breast AND/OR bowel cancer No           Mammogram had genetic testing, low risk , dense breasts annual mammogram        7/17/2024   STI Screening   New sexual partner(s) since last STI/HIV test? No        History of abnormal Pap smear: No - age 30- 64 PAP with HPV every 5 years recommended        Latest Ref Rng & Units 2/18/2019    11:28 AM 2/18/2019    10:57 AM 2/1/2016     5:14 PM   PAP / HPV   PAP (Historical)   NIL     HPV 16 DNA NEG^Negative Negative   Negative    HPV 18 DNA NEG^Negative Negative   Negative    Other HR HPV NEG^Negative Negative   Negative      ASCVD Risk    The 10-year ASCVD risk score (Roger ESCOBAR, et al., 2019) is: 0.3%    Values used to calculate the score:      Age: 44 years      Sex: Female      Is Non- : No      Diabetic: No      Tobacco smoker: No      Systolic Blood Pressure: 103 mmHg      Is BP treated: No      HDL Cholesterol: 54 mg/dL      Total Cholesterol: 136 mg/dL        7/17/2024   Contraception/Family Planning   Questions about contraception or family planning No           Reviewed and updated as needed this visit by Provider   Tobacco  Allergies  Meds  Problems  Med Hx  Surg Hx  Fam Hx            Labs reviewed in EPIC  BP Readings from Last 3 Encounters:   07/22/24 103/68   03/07/24 104/66   12/06/23 124/74    Wt Readings from Last 3 Encounters:   07/22/24 73.3 kg (161 lb 8 oz)   03/07/24 70.9 kg (156 lb 3.2 oz)   12/06/23 70.3 kg (155 lb)                  Patient Active Problem List   Diagnosis    Elevated TSH    Thyroid disease, antepartum    History of multiple miscarriages    Hashimoto's thyroiditis    Indication for care in labor or delivery    Labor and delivery, indication for care    Rhinoconjunctivitis    Chronic sinusitis, unspecified location    Gastroesophageal reflux disease without esophagitis    Non-functioning gallbladder    Eczema, unspecified type    Pruritic disorder    Biliary colic    Neck pain    Hypothyroidism     Past Surgical History:   Procedure Laterality Date    ABDOMEN SURGERY  2022    COLONOSCOPY N/A 05/18/2023    Procedure: Colonoscopy;  Surgeon: Avril Serra MD;  Location:  GI    DAVINCI LAPAROSCOPIC CHOLECYSTECTOMY WITHOUT GRAMS N/A 02/05/2021    Procedure: CHOLECYSTECTOMY, ROBOT-ASSISTED, LAPAROSCOPIC;  Surgeon: Olman Goss MD;  Location: [facility] OR    DAVINCI PELVIC PROCEDURE  02/22/2012    Procedure:DAVINCI PELVIC PROCEDURE; DAVINCI DIAGNOSTIC PELVISCOPY WITH OMNIGUIDE C02 LASER, DIAGNOSTIC HYSTEROSCOPY, EXTENSIVE LYSIS EXCISION OF ENDOMETRIOSIS, BILATERAL  URETEROLYSIS, D & C; Surgeon:JOHN KELLY; Location: OR       Social History     Tobacco Use    Smoking status: Never     Passive exposure: Never    Smokeless tobacco: Never   Substance Use Topics    Alcohol use: Never     Family History   Problem Relation Age of Onset    Cerebrovascular Disease Mother     Obesity Mother     Cancer Other         liver cancer    Liver Cancer Maternal Grandmother 79    Heart Disease Paternal Grandmother 62    Ovarian Cancer Paternal Grandmother 92    Heart Disease Paternal Grandfather 60    Breast Cancer Paternal Aunt 59            Melanoma No family hx of     Skin Cancer No family hx of     Hypertension No family hx of          Current Outpatient Medications   Medication Sig Dispense Refill    cetirizine (ZYRTEC) 10 MG tablet Take 10 mg by mouth daily as needed for allergies      EPINEPHrine (ANY BX GENERIC EQUIV) 0.3 MG/0.3ML injection 2-pack Inject 0.3 mLs (0.3 mg) into the muscle as needed for anaphylaxis May repeat one time in 5-15 minutes if response to initial dose is inadequate. 2 each 1    fluticasone (FLONASE) 50 MCG/ACT nasal spray PRN      levothyroxine (SYNTHROID/LEVOTHROID) 75 MCG tablet Take 1 tablet (75 mcg) by mouth daily 90 tablet 3    albuterol (PROAIR HFA/PROVENTIL HFA/VENTOLIN HFA) 108 (90 Base) MCG/ACT inhaler  (Patient not taking: Reported on 2024)       Allergies   Allergen Reactions    Hornets Swelling    Amoxicillin-Pot Clavulanate Nausea and Vomiting    Nickel Rash     Recent Labs   Lab Test 24  1116 23  0933 22  1028 21  0955 21  1015 20  1223 19  1232 10/25/18  0920 18  1028   A1C  --   --   --   --   --  5.4  --  5.5 5.9*   LDL  --   --   --  70  --   --   --   --   --    HDL  --   --   --  54  --   --   --   --   --    TRIG  --   --   --  57  --   --   --   --   --    ALT  --  9  -  18 22 20   < >  --   --    CR  --  0.64  --  0.61 0.68 0.57   < >  --   --    GFRESTIMATED  --  >90  --   ">90 >90 >90   < >  --   --    GFRESTBLACK  --   --   --  >90 >90 >90   < >  --   --    POTASSIUM  --  3.8  --  3.6 4.2 4.1   < >  --   --    TSH 1.92 3.69   < > 1.76 2.11 1.41   < > 1.74 1.87    < > = values in this interval not displayed.          Review of Systems  Problem list, PMH, Surgical HX, FH, SH, allergies, medications,immunizations reviewed and updated in Epic.  ROS noted in HPI and ROS,staff / patient questionnaires reviewed by me.        Objective    Exam  /68 (BP Location: Right arm, Patient Position: Sitting, Cuff Size: Adult Regular)   Pulse 90   Temp 98.1  F (36.7  C) (Oral)   Resp 16   Ht 1.626 m (5' 4\")   Wt 73.3 kg (161 lb 8 oz)   LMP 07/14/2024   SpO2 100%   Breastfeeding No   BMI 27.72 kg/m     Estimated body mass index is 27.72 kg/m  as calculated from the following:    Height as of this encounter: 1.626 m (5' 4\").    Weight as of this encounter: 73.3 kg (161 lb 8 oz).    Physical Exam  GENERAL: alert and no distress  EYES: Eyes grossly normal to inspection, PERRL and conjunctivae and sclerae normal  HENT: ear canals and TM's normal, mouth without ulcers or lesions  NECK: no adenopathy, no asymmetry, masses, or scars  RESP: lungs clear to auscultation - no rales, rhonchi or wheezes  BREAST: normal without masses, tenderness or nipple discharge and no palpable axillary masses or adenopathy  CV: regular rate and rhythm, normal S1 S2, no S3 or S4, no murmur, click or rub, no peripheral edema  ABDOMEN: soft, nontender, no hepatosplenomegaly, no masses and bowel sounds normal   (female) w/bimanual: normal female external genitalia, normal urethral meatus, normal vaginal mucosa, and normal cervix/adnexa/uterus without masses or discharge  MS: no gross musculoskeletal defects noted, no edema  SKIN: no suspicious lesions or rashes  NEURO: Normal strength and tone, mentation intact and speech normal  PSYCH: mentation appears normal, affect normal/bright    Results for orders placed or " performed in visit on 07/22/24   TSH WITH FREE T4 REFLEX     Status: Normal   Result Value Ref Range    TSH 1.92 0.30 - 4.20 uIU/mL    Result back after visit in range on current dose.    Signed Electronically by: Lars Burt MD

## 2024-07-22 ENCOUNTER — OFFICE VISIT (OUTPATIENT)
Dept: FAMILY MEDICINE | Facility: CLINIC | Age: 44
End: 2024-07-22
Payer: COMMERCIAL

## 2024-07-22 ENCOUNTER — LAB (OUTPATIENT)
Dept: LAB | Facility: CLINIC | Age: 44
End: 2024-07-22
Payer: COMMERCIAL

## 2024-07-22 VITALS
SYSTOLIC BLOOD PRESSURE: 103 MMHG | RESPIRATION RATE: 16 BRPM | OXYGEN SATURATION: 100 % | DIASTOLIC BLOOD PRESSURE: 68 MMHG | WEIGHT: 161.5 LBS | TEMPERATURE: 98.1 F | HEART RATE: 90 BPM | HEIGHT: 64 IN | BODY MASS INDEX: 27.57 KG/M2

## 2024-07-22 DIAGNOSIS — E03.9 ACQUIRED HYPOTHYROIDISM: ICD-10-CM

## 2024-07-22 DIAGNOSIS — T63.451S: ICD-10-CM

## 2024-07-22 DIAGNOSIS — Z00.00 ROUTINE GENERAL MEDICAL EXAMINATION AT A HEALTH CARE FACILITY: ICD-10-CM

## 2024-07-22 DIAGNOSIS — E06.3 HASHIMOTO'S THYROIDITIS: ICD-10-CM

## 2024-07-22 DIAGNOSIS — Z12.31 VISIT FOR SCREENING MAMMOGRAM: ICD-10-CM

## 2024-07-22 DIAGNOSIS — Z00.00 ENCOUNTER FOR PREVENTATIVE ADULT HEALTH CARE EXAMINATION: Primary | ICD-10-CM

## 2024-07-22 DIAGNOSIS — Z12.4 CERVICAL CANCER SCREENING: ICD-10-CM

## 2024-07-22 LAB — TSH SERPL DL<=0.005 MIU/L-ACNC: 1.92 UIU/ML (ref 0.3–4.2)

## 2024-07-22 PROCEDURE — 84443 ASSAY THYROID STIM HORMONE: CPT | Performed by: PATHOLOGY

## 2024-07-22 PROCEDURE — 99396 PREV VISIT EST AGE 40-64: CPT | Performed by: FAMILY MEDICINE

## 2024-07-22 PROCEDURE — G0145 SCR C/V CYTO,THINLAYER,RESCR: HCPCS | Performed by: FAMILY MEDICINE

## 2024-07-22 PROCEDURE — 87624 HPV HI-RISK TYP POOLED RSLT: CPT | Performed by: FAMILY MEDICINE

## 2024-07-22 PROCEDURE — 36415 COLL VENOUS BLD VENIPUNCTURE: CPT | Performed by: PATHOLOGY

## 2024-07-22 PROCEDURE — 99000 SPECIMEN HANDLING OFFICE-LAB: CPT | Performed by: PATHOLOGY

## 2024-07-22 RX ORDER — LEVOTHYROXINE SODIUM 75 UG/1
75 TABLET ORAL DAILY
Qty: 90 TABLET | Refills: 3 | Status: SHIPPED | OUTPATIENT
Start: 2024-07-22

## 2024-07-22 RX ORDER — EPINEPHRINE 0.3 MG/.3ML
0.3 INJECTION SUBCUTANEOUS PRN
Qty: 2 EACH | Refills: 1 | Status: SHIPPED | OUTPATIENT
Start: 2024-07-22

## 2024-07-22 NOTE — PATIENT INSTRUCTIONS
Patient Education   Preventive Care Advice   This is general advice given by our system to help you stay healthy. However, your care team may have specific advice just for you. Please talk to your care team about your preventive care needs.  Nutrition  Eat 5 or more servings of fruits and vegetables each day.  Try wheat bread, brown rice and whole grain pasta (instead of white bread, rice, and pasta).  Get enough calcium and vitamin D. Check the label on foods and aim for 100% of the RDA (recommended daily allowance).  Lifestyle  Exercise at least 150 minutes each week  (30 minutes a day, 5 days a week).  Do muscle strengthening activities 2 days a week. These help control your weight and prevent disease.  No smoking.  Wear sunscreen to prevent skin cancer.  Have a dental exam and cleaning every 6 months.  Yearly exams  See your health care team every year to talk about:  Any changes in your health.  Any medicines your care team has prescribed.  Preventive care, family planning, and ways to prevent chronic diseases.  Shots (vaccines)   HPV shots (up to age 26), if you've never had them before.  Hepatitis B shots (up to age 59), if you've never had them before.  COVID-19 shot: Get this shot when it's due.  Flu shot: Get a flu shot every year.  Tetanus shot: Get a tetanus shot every 10 years.  Pneumococcal, hepatitis A, and RSV shots: Ask your care team if you need these based on your risk.  Shingles shot (for age 50 and up)  General health tests  Diabetes screening:  Starting at age 35, Get screened for diabetes at least every 3 years.  If you are younger than age 35, ask your care team if you should be screened for diabetes.  Cholesterol test: At age 39, start having a cholesterol test every 5 years, or more often if advised.  Bone density scan (DEXA): At age 50, ask your care team if you should have this scan for osteoporosis (brittle bones).  Hepatitis C: Get tested at least once in your life.  STIs (sexually  transmitted infections)  Before age 24: Ask your care team if you should be screened for STIs.  After age 24: Get screened for STIs if you're at risk. You are at risk for STIs (including HIV) if:  You are sexually active with more than one person.  You don't use condoms every time.  You or a partner was diagnosed with a sexually transmitted infection.  If you are at risk for HIV, ask about PrEP medicine to prevent HIV.  Get tested for HIV at least once in your life, whether you are at risk for HIV or not.  Cancer screening tests  Cervical cancer screening: If you have a cervix, begin getting regular cervical cancer screening tests starting at age 21.  Breast cancer scan (mammogram): If you've ever had breasts, begin having regular mammograms starting at age 40. This is a scan to check for breast cancer.  Colon cancer screening: It is important to start screening for colon cancer at age 45.  Have a colonoscopy test every 10 years (or more often if you're at risk) Or, ask your provider about stool tests like a FIT test every year or Cologuard test every 3 years.  To learn more about your testing options, visit:   .  For help making a decision, visit:   https://bit.ly/yo21165.  Prostate cancer screening test: If you have a prostate, ask your care team if a prostate cancer screening test (PSA) at age 55 is right for you.  Lung cancer screening: If you are a current or former smoker ages 50 to 80, ask your care team if ongoing lung cancer screenings are right for you.  For informational purposes only. Not to replace the advice of your health care provider. Copyright   2023 [employer]. All rights reserved. Clinically reviewed by the [facility] Transitions Program. Yext [id number] - REV 01/24.

## 2024-07-23 ENCOUNTER — OFFICE VISIT (OUTPATIENT)
Dept: URGENT CARE | Facility: URGENT CARE | Age: 44
End: 2024-07-23
Payer: COMMERCIAL

## 2024-07-23 VITALS
OXYGEN SATURATION: 99 % | RESPIRATION RATE: 16 BRPM | BODY MASS INDEX: 27.98 KG/M2 | SYSTOLIC BLOOD PRESSURE: 115 MMHG | WEIGHT: 163 LBS | DIASTOLIC BLOOD PRESSURE: 74 MMHG | HEART RATE: 84 BPM | TEMPERATURE: 97.8 F

## 2024-07-23 DIAGNOSIS — T63.454A HORNET STING, UNDETERMINED INTENT, INITIAL ENCOUNTER: Primary | ICD-10-CM

## 2024-07-23 LAB
HPV HR 12 DNA CVX QL NAA+PROBE: NEGATIVE
HPV16 DNA CVX QL NAA+PROBE: NEGATIVE
HPV18 DNA CVX QL NAA+PROBE: NEGATIVE
HUMAN PAPILLOMA VIRUS FINAL DIAGNOSIS: NORMAL

## 2024-07-23 PROCEDURE — 99213 OFFICE O/P EST LOW 20 MIN: CPT

## 2024-07-23 NOTE — PATIENT INSTRUCTIONS
Use the epi-pen as prescribed from your primary care provider.  Situations where you should use the epi-pen include but are not limited to: chest tightness, widespread rash, difficulty breathing, difficulty swallowing, tongue/lip swelling, sensation of throat closing and/or dizziness/lightheadedness.  After you use the epi-pen, you should seek care at an emergency department for evaluation and/or monitoring.

## 2024-07-23 NOTE — PROGRESS NOTES
Assessment & Plan     Hornet sting, undetermined intent, initial encounter  Reassured patient. Discussed continued use of daily Zyrtec, topical itch cream, ice, and can use oral Benadryl at night as needed.  Advised the patient to  the EpiPen.  Provided education on when the use of an Epi pen is necessary such as but not limited to with chest tightness, SOB, lip/mouth/tongue swelling, systemic rash spread. Instructed to follow up in the ER if she needs to use the Epi pen.    Return if symptoms worsen or fail to improve, for Follow up.     Yumiko Sands NP Student July 23, 2024     [facility]    Physician Attestation   I, ULICES Joyner CNP, was present with the [employer] student who participated in the service and in the documentation of the note.  I have verified the history and personally performed the physical exam and medical decision making.  I agree with the assessment and plan of care as documented in the note.      Items personally reviewed: vitals and clinical assessment and agree with the interpretation documented in the note.    ULICES Joyner CNP    Subjective   Karen Beverly is a 44 year old who presents for the following health issues     Patient presents with:  Insect Bite: Hornet sting happen 2 hours ago back of left leg        HPI    Stung on back of L calf by hornet ~2 hours ago, swelling is getting worse. Stabbing, burning pain. Took Zyrtec right away, Benadryl gel and ice.     Denies shortness of breath, chest pain, palpitation, swelling of lips/mouth/throat, difficulty swallowing.     She is concerned because her PCP prescribed her an Epi pen yesterday for hornet stings because she shower her a photo pf a previous sting that caused a reaction and covered ~50 % of her thigh in the past. She did not  the prescription for the Epi pen yet and is nervous as to if she should be using it.     Review of Systems   ROS negative except as stated  above.      Objective    /74 (BP Location: Right arm, Patient Position: Sitting, Cuff Size: Adult Regular)   Pulse 84   Temp 97.8  F (36.6  C) (Tympanic)   Resp 16   Wt 73.9 kg (163 lb)   LMP 07/14/2024   SpO2 99%   BMI 27.98 kg/m    Physical Exam   GENERAL: alert and no distress  SKIN: 3 in by 2 in raised, erythematous wheal on posterior aspect of L calf

## 2024-07-26 LAB
BKR LAB AP GYN ADEQUACY: NORMAL
BKR LAB AP GYN INTERPRETATION: NORMAL
BKR LAB AP LMP: NORMAL
BKR LAB AP PREVIOUS ABNORMAL: NORMAL
PATH REPORT.COMMENTS IMP SPEC: NORMAL
PATH REPORT.COMMENTS IMP SPEC: NORMAL
PATH REPORT.RELEVANT HX SPEC: NORMAL

## 2024-07-26 NOTE — RESULT ENCOUNTER NOTE
Your PAP is normal NIL and HPV negative good results. Next pap in 5 years due 7/2029.  We recommend an annual visit for preventive cares, earlier visit if health concerns to be addressed.  Lars Burt MD

## 2024-10-03 ENCOUNTER — OFFICE VISIT (OUTPATIENT)
Dept: FAMILY MEDICINE | Facility: CLINIC | Age: 44
End: 2024-10-03
Payer: COMMERCIAL

## 2024-10-03 VITALS
SYSTOLIC BLOOD PRESSURE: 120 MMHG | WEIGHT: 162.2 LBS | BODY MASS INDEX: 27.69 KG/M2 | DIASTOLIC BLOOD PRESSURE: 76 MMHG | HEART RATE: 77 BPM | TEMPERATURE: 97.2 F | HEIGHT: 64 IN | OXYGEN SATURATION: 100 % | RESPIRATION RATE: 16 BRPM

## 2024-10-03 DIAGNOSIS — M25.50 MULTIPLE JOINT PAIN: Primary | ICD-10-CM

## 2024-10-03 DIAGNOSIS — R53.83 FATIGUE, UNSPECIFIED TYPE: ICD-10-CM

## 2024-10-03 LAB
ALBUMIN SERPL BCG-MCNC: 4.6 G/DL (ref 3.5–5.2)
ALBUMIN UR-MCNC: NEGATIVE MG/DL
ALP SERPL-CCNC: 55 U/L (ref 40–150)
ALT SERPL W P-5'-P-CCNC: 14 U/L (ref 0–50)
ANION GAP SERPL CALCULATED.3IONS-SCNC: 9 MMOL/L (ref 7–15)
APPEARANCE UR: CLEAR
AST SERPL W P-5'-P-CCNC: 22 U/L (ref 0–45)
BILIRUB SERPL-MCNC: 0.3 MG/DL
BILIRUB UR QL STRIP: NEGATIVE
BUN SERPL-MCNC: 8.4 MG/DL (ref 6–20)
CALCIUM SERPL-MCNC: 9.5 MG/DL (ref 8.8–10.4)
CHLORIDE SERPL-SCNC: 103 MMOL/L (ref 98–107)
COLOR UR AUTO: YELLOW
CREAT SERPL-MCNC: 0.58 MG/DL (ref 0.51–0.95)
CRP SERPL-MCNC: 4.82 MG/L
EGFRCR SERPLBLD CKD-EPI 2021: >90 ML/MIN/1.73M2
ERYTHROCYTE [DISTWIDTH] IN BLOOD BY AUTOMATED COUNT: 11.8 % (ref 10–15)
ERYTHROCYTE [SEDIMENTATION RATE] IN BLOOD BY WESTERGREN METHOD: 23 MM/HR (ref 0–20)
GLUCOSE SERPL-MCNC: 79 MG/DL (ref 70–99)
GLUCOSE UR STRIP-MCNC: NEGATIVE MG/DL
HCO3 SERPL-SCNC: 27 MMOL/L (ref 22–29)
HCT VFR BLD AUTO: 37.6 % (ref 35–47)
HGB BLD-MCNC: 12.3 G/DL (ref 11.7–15.7)
HGB UR QL STRIP: NEGATIVE
KETONES UR STRIP-MCNC: NEGATIVE MG/DL
LEUKOCYTE ESTERASE UR QL STRIP: NEGATIVE
MCH RBC QN AUTO: 31.4 PG (ref 26.5–33)
MCHC RBC AUTO-ENTMCNC: 32.7 G/DL (ref 31.5–36.5)
MCV RBC AUTO: 96 FL (ref 78–100)
NITRATE UR QL: NEGATIVE
PH UR STRIP: 7 [PH] (ref 5–7)
PLATELET # BLD AUTO: 290 10E3/UL (ref 150–450)
POTASSIUM SERPL-SCNC: 4 MMOL/L (ref 3.4–5.3)
PROT SERPL-MCNC: 7.6 G/DL (ref 6.4–8.3)
RBC # BLD AUTO: 3.92 10E6/UL (ref 3.8–5.2)
RHEUMATOID FACT SERPL-ACNC: <10 IU/ML
SODIUM SERPL-SCNC: 139 MMOL/L (ref 135–145)
SP GR UR STRIP: 1.01 (ref 1–1.03)
TSH SERPL DL<=0.005 MIU/L-ACNC: 1.83 UIU/ML (ref 0.3–4.2)
UROBILINOGEN UR STRIP-ACNC: 0.2 E.U./DL
WBC # BLD AUTO: 5.4 10E3/UL (ref 4–11)

## 2024-10-03 PROCEDURE — 36415 COLL VENOUS BLD VENIPUNCTURE: CPT | Performed by: PHYSICIAN ASSISTANT

## 2024-10-03 PROCEDURE — 99000 SPECIMEN HANDLING OFFICE-LAB: CPT | Performed by: PHYSICIAN ASSISTANT

## 2024-10-03 PROCEDURE — 85027 COMPLETE CBC AUTOMATED: CPT | Performed by: PHYSICIAN ASSISTANT

## 2024-10-03 PROCEDURE — 87798 DETECT AGENT NOS DNA AMP: CPT | Performed by: PHYSICIAN ASSISTANT

## 2024-10-03 PROCEDURE — 99214 OFFICE O/P EST MOD 30 MIN: CPT | Performed by: PHYSICIAN ASSISTANT

## 2024-10-03 PROCEDURE — 81003 URINALYSIS AUTO W/O SCOPE: CPT | Performed by: PHYSICIAN ASSISTANT

## 2024-10-03 PROCEDURE — 86618 LYME DISEASE ANTIBODY: CPT | Performed by: PHYSICIAN ASSISTANT

## 2024-10-03 PROCEDURE — 80053 COMPREHEN METABOLIC PANEL: CPT | Performed by: PHYSICIAN ASSISTANT

## 2024-10-03 PROCEDURE — 85652 RBC SED RATE AUTOMATED: CPT | Performed by: PHYSICIAN ASSISTANT

## 2024-10-03 PROCEDURE — 86747 PARVOVIRUS ANTIBODY: CPT | Mod: 90 | Performed by: PHYSICIAN ASSISTANT

## 2024-10-03 PROCEDURE — 87468 ANAPLSMA PHGCYTOPHLM AMP PRB: CPT | Performed by: PHYSICIAN ASSISTANT

## 2024-10-03 PROCEDURE — 86140 C-REACTIVE PROTEIN: CPT | Performed by: PHYSICIAN ASSISTANT

## 2024-10-03 PROCEDURE — 84443 ASSAY THYROID STIM HORMONE: CPT | Performed by: PHYSICIAN ASSISTANT

## 2024-10-03 PROCEDURE — 86431 RHEUMATOID FACTOR QUANT: CPT | Performed by: PHYSICIAN ASSISTANT

## 2024-10-03 PROCEDURE — 86038 ANTINUCLEAR ANTIBODIES: CPT | Performed by: PHYSICIAN ASSISTANT

## 2024-10-03 ASSESSMENT — ENCOUNTER SYMPTOMS
SHORTNESS OF BREATH: 0
LIGHT-HEADEDNESS: 0
BLOOD IN STOOL: 0
COUGH: 0
DIAPHORESIS: 0
HEADACHES: 0
ARTHRALGIAS: 1
FEVER: 0
DIZZINESS: 0
VOMITING: 0
RHINORRHEA: 0
FATIGUE: 1
UNEXPECTED WEIGHT CHANGE: 0
MYALGIAS: 0
DIARRHEA: 0
ABDOMINAL PAIN: 0
NAUSEA: 0

## 2024-10-03 NOTE — PROGRESS NOTES
Assessment & Plan     Multiple joint pain  Fatigue, unspecified type  Patient is a 44-year-old female who presents to clinic due to worsening joint pain.  Joint pain initially started February 2024 at bilateral wrists.  Patient was seen by rheumatology and symptoms thought to be due to carpal tunnel at that time.  Symptoms improved and then worsened in the last 3-4 weeks.  Within the last month patient has also developed bilateral knee pain, bilateral ankle pain, and fatigue.  Pain is waking patient at night.  Travel to [place] with hiking during trip prior to symptoms starting.  Vital signs normal.  Physical exam with mild pallor.  No other acute abnormalities noted.  Differential diagnosis is broad and includes, but is not limited to, anemia, Lyme disease, other tickborne illness, parvovirus, thyroid abnormalities, autoimmune disorder, inflammatory disorder.  Will start with labs as listed below.  Discussed importance of reevaluation for any new or worsening symptoms.  - UA Macroscopic with reflex to Microscopic and Culture - Lab Collect; Future  - Comprehensive metabolic panel (BMP + Alb, Alk Phos, ALT, AST, Total. Bili, TP); Future  - CBC with platelets; Future  - ESR: Erythrocyte sedimentation rate; Future  - CRP, inflammation; Future  - LYME DISEASE TOTAL ANTIBODIES WITH REFLEX TO CONFIRMATION; Future  - Tick-Borne Disease Panel by PCR; Future  - Anti Nuclear Ermelinda IgG by IFA with Reflex; Future  - Rheumatoid factor; Future  - TSH with free T4 reflex; Future  - Parvovirus B19 antibodies IgG IgM; Future         See Patient Instructions    Hilda Jones is a 44 year old, presenting for the following health issues:  Pain      10/3/2024     7:44 AM   Additional Questions   Roomed by MP         10/3/2024     7:44 AM   Patient Reported Additional Medications   Patient reports taking the following new medications None per patient     History of Present Illness       Reason for visit:  Joints pain  Symptom onset:   "3-4 weeks ago  Symptoms include:  Intense (burning, shooting) pain in wrists, fingers, knees and feet. stiffening fingers, pain that is much worse at night.  Symptom intensity:  Severe  Symptom progression:  Staying the same  Had these symptoms before:  Yes  Has tried/received treatment for these symptoms:  Yes  Previous treatment was successful:  No  What makes it worse:  It is difficult to find out what makes me deel shahid. The pain is unpredictable but on the night time is worst.  What makes it better:  I took ibuprofen and Tynelon but then i got stomach pain and heartburn .   She is taking medications regularly.       Patient notes pain starting in February 2024 in bilateral wrists   She saw rheumatology who told her it was likely carpal tunnel  Pain went on and then improved  In the last 3-4 weeks pain in the bilateral wrists, knees, ankles, and hands   No fever, cough, dyspnea  Patient had rhinorrhea and sinus symptoms 1 week ago   Pain is waking patient at night  Energy is worse due to pain  Pain worsens in the afternoon evening   Intermittent numbness in hands  Patient was in [place] hiking before symptoms started. [place] has high rate of Lyme disease.  No ticks were found at that time.      Review of Systems   Constitutional:  Positive for fatigue. Negative for diaphoresis, fever and unexpected weight change.   HENT:  Negative for congestion and rhinorrhea.    Respiratory:  Negative for cough and shortness of breath.    Cardiovascular:  Negative for chest pain.   Gastrointestinal:  Negative for abdominal pain, blood in stool, diarrhea, nausea and vomiting.   Musculoskeletal:  Positive for arthralgias. Negative for myalgias.   Neurological:  Negative for dizziness, light-headedness and headaches.           Objective    /76   Pulse 77   Temp 97.2  F (36.2  C) (Temporal)   Resp 16   Ht 1.626 m (5' 4\")   Wt 73.6 kg (162 lb 3.2 oz)   LMP 09/26/2024 (Approximate)   SpO2 100%   BMI 27.84 kg/m    Body " mass index is 27.84 kg/m .  Physical Exam  Vitals and nursing note reviewed.   Constitutional:       General: She is not in acute distress.     Appearance: Normal appearance.   HENT:      Head: Normocephalic and atraumatic.      Mouth/Throat:      Mouth: Mucous membranes are moist.      Pharynx: Oropharynx is clear.   Eyes:      Extraocular Movements: Extraocular movements intact.      Pupils: Pupils are equal, round, and reactive to light.   Cardiovascular:      Rate and Rhythm: Normal rate and regular rhythm.      Heart sounds: Normal heart sounds.   Pulmonary:      Effort: Pulmonary effort is normal.      Breath sounds: Normal breath sounds. No wheezing, rhonchi or rales.   Abdominal:      General: Bowel sounds are normal.      Palpations: Abdomen is soft. There is no mass.      Tenderness: There is no abdominal tenderness. There is no guarding or rebound.      Comments: No hepatosplenolegaly   Musculoskeletal:         General: No swelling, tenderness or deformity. Normal range of motion.      Cervical back: Normal range of motion.      Comments: Normal gait and normal ROM of joints    Lymphadenopathy:      Cervical: No cervical adenopathy.   Skin:     General: Skin is warm and dry.      Coloration: Skin is pale.      Findings: No rash.   Neurological:      General: No focal deficit present.      Mental Status: She is alert.   Psychiatric:         Mood and Affect: Mood normal.         Behavior: Behavior normal.         Thought Content: Thought content normal.         Judgment: Judgment normal.                    Signed Electronically by: Viridiana Carr PA-C

## 2024-10-03 NOTE — PATIENT INSTRUCTIONS
There were many different things that could be causing your joint pain.  For further evaluation we are completing lab tests.  I will review the results and send messages via [employer].  Please reach out or follow-up right away in clinic, urgent care, or emergency department for any new or worsening symptoms.  Let me know if you have any questions or concerns.

## 2024-10-04 LAB
A PHAGOCYTOPH DNA BLD QL NAA+PROBE: NOT DETECTED
ANA SER QL IF: NEGATIVE
B BURGDOR IGG+IGM SER QL: 0.04
BABESIA DNA BLD QL NAA+PROBE: NOT DETECTED
EHRLICHIA DNA SPEC QL NAA+PROBE: NOT DETECTED

## 2024-10-06 LAB
B19V IGG SER IA-ACNC: 6.54 IV
B19V IGM SER IA-ACNC: 0.25 IV

## 2024-11-08 ENCOUNTER — ANCILLARY PROCEDURE (OUTPATIENT)
Dept: MAMMOGRAPHY | Facility: CLINIC | Age: 44
End: 2024-11-08
Attending: FAMILY MEDICINE
Payer: COMMERCIAL

## 2024-11-08 DIAGNOSIS — Z12.31 VISIT FOR SCREENING MAMMOGRAM: ICD-10-CM

## 2024-11-08 PROCEDURE — 77063 BREAST TOMOSYNTHESIS BI: CPT | Mod: GC | Performed by: STUDENT IN AN ORGANIZED HEALTH CARE EDUCATION/TRAINING PROGRAM

## 2024-11-08 PROCEDURE — 77067 SCR MAMMO BI INCL CAD: CPT | Mod: GC | Performed by: STUDENT IN AN ORGANIZED HEALTH CARE EDUCATION/TRAINING PROGRAM

## 2024-11-26 ENCOUNTER — APPOINTMENT (OUTPATIENT)
Dept: URBAN - METROPOLITAN AREA CLINIC 252 | Age: 44
Setting detail: DERMATOLOGY
End: 2024-12-01

## 2024-11-26 VITALS — HEIGHT: 64 IN | RESPIRATION RATE: 16 BRPM | WEIGHT: 160 LBS

## 2024-11-26 DIAGNOSIS — D49.2 NEOPLASM OF UNSPECIFIED BEHAVIOR OF BONE, SOFT TISSUE, AND SKIN: ICD-10-CM

## 2024-11-26 PROCEDURE — 11102 TANGNTL BX SKIN SINGLE LES: CPT

## 2024-11-26 PROCEDURE — OTHER COUNSELING: OTHER

## 2024-11-26 PROCEDURE — OTHER BIOPSY BY SHAVE METHOD: OTHER

## 2024-11-26 ASSESSMENT — LOCATION DETAILED DESCRIPTION DERM: LOCATION DETAILED: RIGHT DISTAL PRETIBIAL REGION

## 2024-11-26 ASSESSMENT — LOCATION SIMPLE DESCRIPTION DERM: LOCATION SIMPLE: RIGHT PRETIBIAL REGION

## 2024-11-26 ASSESSMENT — LOCATION ZONE DERM: LOCATION ZONE: LEG

## 2024-11-26 NOTE — PROCEDURE: BIOPSY BY SHAVE METHOD
Validate Triangulation: No
Anesthesia Volume In Cc: 0.3
Size Of Lesion In Cm: 0
Wound Care: Aquaphor
Curettage Text: The wound bed was treated with curettage after the biopsy was performed.
Hide Additional Anticipated Plan?: Yes
Cryotherapy Text: The wound bed was treated with cryotherapy after the biopsy was performed.
Billing Type: Third-Party Bill
Information: Selecting Yes will display possible errors in your note based on the variables you have selected. This validation is only offered as a suggestion for you. PLEASE NOTE THAT THE VALIDATION TEXT WILL BE REMOVED WHEN YOU FINALIZE YOUR NOTE. IF YOU WANT TO FAX A PRELIMINARY NOTE YOU WILL NEED TO TOGGLE THIS TO 'NO' IF YOU DO NOT WANT IT IN YOUR FAXED NOTE.
Electrodesiccation Text: The wound bed was treated with electrodesiccation after the biopsy was performed.
Dressing: bandage
Consent: - Consent was obtained and risks were reviewed prior to procedure today. All questions were answered prior to procedure today.\\n- Risks discussed include but are not limited to scarring, infection, bleeding, scabbing, incomplete removal, nerve damage, pain, and allergy to anesthesia.
Electrodesiccation And Curettage Text: The wound bed was treated with electrodesiccation and curettage after the biopsy was performed.
Silver Nitrate Text: The wound bed was treated with silver nitrate after the biopsy was performed.
Anesthesia Type: 1% lidocaine with epinephrine
Detail Level: Detailed
Post-Care Instructions: WOUND CARE:\\nDo NOT submerge wound in a bath, swimming pool, or hot tub for at least 1 week or for as long as there is an open wound. Gently cleanse the site daily with mild soap and water. Be careful NOT to allow a forceful stream of water to hit the biopsy site. After cleaning/showering, apply a thin layer of petrolatum ointment or Aquaphor in the wound followed by an adhesive bandage. Continue this process daily until healed. \\n\\nBLEEDING:\\nIf you develop persistent bleeding, apply firm and steady pressure over the dressing with gauze for 15 minutes. If bleeding persists, reapply pressure with an ice pack over the gauze for 15 minutes. NEVER APPLY ICE DIRECTLY TO THE SKIN. Do NOT peek under the gauze during these 15 minutes of pressure.  Call our office at [phone] if you cannot get the bleeding to stop. \\n\\nINFECTION:\\nSigns of infection may include increasing rather than decreasing pain (after the first few days), increasing redness/swelling/heat, draining pus, pink/red streaks around the wound, and fever or chills.  Please call our office immediately at [phone] if infection is suspected. It is normal to have some mild redness on or around the wound edges; this will lighten day by day and will become less tender.\\n\\nPAIN:\\nPain is usually minimal, but if needed you may take acetaminophen (Tylenol) every four hours as needed. Applying an ice pack over the dressing for 15-20 minutes every 2-3 hours will relieve swelling, lessen pain, and help minimize bruising. NEVER APPLY ICE DIRECTLY TO THE SKIN. Avoid ibuprofen (Advil, Motrin) and naproxen (Aleve) for the first 48 hours as these can increase bleeding.\\n\\nSWELLING AND BRUISING:\\nSwelling and bruising are common and temporary, usually lasting 1 - 2 weeks. It is more common in areas treated around the eyes, hands, and feet. In the days following the procedure, swelling and bruising can be minimized by keeping the affected areas elevated when possible, reducing salty foods, and applying ice packs over the dressing for 15-20 minutes every 2-3 hours. NEVER APPLY ICE DIRECTLY TO THE SKIN.\\n\\nITCHING:\\nItchiness on and around the wound is very common and can last several days to weeks after surgery. Mild itch is normal as the wound is healing. \\n\\nNERVE CHANGES:\\nNumbness is usually temporary, but it may last for several weeks to months. You may also experience sharp pains at the wound sight as it heals.  Mild pain is normal and will gradually improve with time.\\n \\nNO SMOKING:\\nSmoking will delay the healing process. If you smoke, we recommend trying to quit or at minimum reduce how much you smoke following a procedure.
Biopsy Type: H and E
Hemostasis: Drysol
Notification Instructions: - It can take up to 2 -3 weeks to get a biopsy result. \\n- Please refrain from calling to request results until after 2 weeks.
Type Of Destruction Used: Curettage
Lab: [id number]
Depth Of Biopsy: dermis
Biopsy Method: Dermablade

## 2025-07-01 ENCOUNTER — PATIENT OUTREACH (OUTPATIENT)
Dept: FAMILY MEDICINE | Facility: CLINIC | Age: 45
End: 2025-07-01
Payer: COMMERCIAL

## 2025-07-01 NOTE — TELEPHONE ENCOUNTER
Patient Quality Outreach    Patient is due for the following:   Physical Preventive Adult Physical      Topic Date Due    COVID-19 Vaccine (4 - 2024-25 season) 09/01/2024       Action(s) Taken:   Schedule a Adult Preventative    Type of outreach:    Sent [employer] message.    Questions for provider review:    None         POLY, MEDICAL ASSISTANT

## 2025-08-24 ENCOUNTER — HEALTH MAINTENANCE LETTER (OUTPATIENT)
Age: 45
End: 2025-08-24

## (undated) DEVICE — Device

## (undated) DEVICE — SOL WATER IRRIG 1000ML BOTTLE 2F7114

## (undated) DEVICE — LINEN TOWEL PACK X30 5481

## (undated) DEVICE — SOL NACL 0.9% IRRIG 1000ML BOTTLE 2F7124

## (undated) DEVICE — TUBING INSUFFLATION W/FILTER CPC TO LUER 620-030-301

## (undated) DEVICE — NDL INSUFFLATION 13GA 120MM C2201

## (undated) DEVICE — PACK GOWN 3/PK DISP XL SBA32GPFCB

## (undated) DEVICE — SUCTION IRR STRYKERFLOW II W/TIP 250-070-520

## (undated) DEVICE — SUCTION MANIFOLD NEPTUNE 2 SYS 4 PORT 0702-020-000

## (undated) DEVICE — DAVINCI XI DRAPE ARM 470015

## (undated) DEVICE — DAVINCI XI DRAPE COLUMN 470341

## (undated) DEVICE — DEVICE SUTURE PASSER 14GA WECK EFX EFXSP2

## (undated) DEVICE — DAVINCI XI FCP BIPOLAR FENESTRATED 470205

## (undated) DEVICE — SU MONOCRYL 4-0 PS-2 27" UND Y426H

## (undated) DEVICE — SU VICRYL 0 CT-2 27" J334H

## (undated) DEVICE — SYSTEM CLEARIFY VISUALIZATION 21-345

## (undated) DEVICE — LINEN TOWEL PACK X6 WHITE 5487

## (undated) DEVICE — GLOVE PROTEXIS POWDER FREE SMT 7.5  2D72PT75X

## (undated) DEVICE — ENDO POUCH UNIV RETRIEVAL SYSTEM INZII 10MM CD001

## (undated) DEVICE — PREP CHLORAPREP 26ML TINTED ORANGE  260815

## (undated) DEVICE — ESU GROUND PAD ADULT REM W/15' CORD E7507DB

## (undated) DEVICE — DAVINCI XI SEAL UNIVERSAL 5-8MM 470361

## (undated) DEVICE — DRAPE MAYO STAND 23X54 8337

## (undated) DEVICE — CLIP ENDO HEMO-LOC PURPLE LG 544240

## (undated) DEVICE — BLADE KNIFE SURG 11 371111

## (undated) DEVICE — ENDO TROCAR FIRST ENTRY KII FIOS Z-THRD 05X100MM CTF03

## (undated) DEVICE — DRAPE LAP W/ARMBOARD 29410

## (undated) DEVICE — DRSG STERI STRIP 1/2X4" R1547

## (undated) DEVICE — LINEN GOWN XLG 5407

## (undated) RX ORDER — BUPIVACAINE HYDROCHLORIDE 2.5 MG/ML
INJECTION, SOLUTION EPIDURAL; INFILTRATION; INTRACAUDAL
Status: DISPENSED
Start: 2021-02-05

## (undated) RX ORDER — GABAPENTIN 300 MG/1
CAPSULE ORAL
Status: DISPENSED
Start: 2021-02-05

## (undated) RX ORDER — PROPOFOL 10 MG/ML
INJECTION, EMULSION INTRAVENOUS
Status: DISPENSED
Start: 2021-02-05

## (undated) RX ORDER — ONDANSETRON 2 MG/ML
INJECTION INTRAMUSCULAR; INTRAVENOUS
Status: DISPENSED
Start: 2021-02-05

## (undated) RX ORDER — FENTANYL CITRATE 50 UG/ML
INJECTION, SOLUTION INTRAMUSCULAR; INTRAVENOUS
Status: DISPENSED
Start: 2023-05-18

## (undated) RX ORDER — HYDROMORPHONE HYDROCHLORIDE 1 MG/ML
INJECTION, SOLUTION INTRAMUSCULAR; INTRAVENOUS; SUBCUTANEOUS
Status: DISPENSED
Start: 2021-02-05

## (undated) RX ORDER — CEFAZOLIN SODIUM 2 G/100ML
INJECTION, SOLUTION INTRAVENOUS
Status: DISPENSED
Start: 2021-02-05

## (undated) RX ORDER — FENTANYL CITRATE 50 UG/ML
INJECTION, SOLUTION INTRAMUSCULAR; INTRAVENOUS
Status: DISPENSED
Start: 2021-02-05

## (undated) RX ORDER — ACETAMINOPHEN 325 MG/1
TABLET ORAL
Status: DISPENSED
Start: 2021-02-05

## (undated) RX ORDER — ONDANSETRON 4 MG/1
TABLET, ORALLY DISINTEGRATING ORAL
Status: DISPENSED
Start: 2021-02-05

## (undated) RX ORDER — LIDOCAINE HYDROCHLORIDE 20 MG/ML
INJECTION, SOLUTION EPIDURAL; INFILTRATION; INTRACAUDAL; PERINEURAL
Status: DISPENSED
Start: 2021-02-05

## (undated) RX ORDER — KETOROLAC TROMETHAMINE 30 MG/ML
INJECTION, SOLUTION INTRAMUSCULAR; INTRAVENOUS
Status: DISPENSED
Start: 2021-02-05

## (undated) RX ORDER — EPHEDRINE SULFATE 50 MG/ML
INJECTION, SOLUTION INTRAMUSCULAR; INTRAVENOUS; SUBCUTANEOUS
Status: DISPENSED
Start: 2021-02-05

## (undated) RX ORDER — INDOCYANINE GREEN AND WATER 25 MG
KIT INJECTION
Status: DISPENSED
Start: 2021-02-05

## (undated) RX ORDER — LIDOCAINE HYDROCHLORIDE AND EPINEPHRINE 10; 10 MG/ML; UG/ML
INJECTION, SOLUTION INFILTRATION; PERINEURAL
Status: DISPENSED
Start: 2023-04-17

## (undated) RX ORDER — DEXAMETHASONE SODIUM PHOSPHATE 4 MG/ML
INJECTION, SOLUTION INTRA-ARTICULAR; INTRALESIONAL; INTRAMUSCULAR; INTRAVENOUS; SOFT TISSUE
Status: DISPENSED
Start: 2021-02-05

## (undated) RX ORDER — FENTANYL CITRATE-0.9 % NACL/PF 10 MCG/ML
PLASTIC BAG, INJECTION (ML) INTRAVENOUS
Status: DISPENSED
Start: 2021-02-05